# Patient Record
Sex: MALE | Race: WHITE | NOT HISPANIC OR LATINO | Employment: OTHER | URBAN - METROPOLITAN AREA
[De-identification: names, ages, dates, MRNs, and addresses within clinical notes are randomized per-mention and may not be internally consistent; named-entity substitution may affect disease eponyms.]

---

## 2020-09-18 ENCOUNTER — TELEPHONE (OUTPATIENT)
Dept: CARDIOLOGY CLINIC | Facility: CLINIC | Age: 70
End: 2020-09-18

## 2020-09-18 NOTE — TELEPHONE ENCOUNTER
Spoke Ivet Cain at [facility] ([place], [place]) last Doctor note, labs and test will be faxed over  Patient needs to sign medical release form at the time of visit

## 2020-09-21 ENCOUNTER — OFFICE VISIT (OUTPATIENT)
Dept: CARDIOLOGY CLINIC | Facility: CLINIC | Age: 70
End: 2020-09-21
Payer: MEDICARE

## 2020-09-21 VITALS
OXYGEN SATURATION: 98 % | BODY MASS INDEX: 30.98 KG/M2 | WEIGHT: 216.4 LBS | SYSTOLIC BLOOD PRESSURE: 104 MMHG | HEART RATE: 106 BPM | DIASTOLIC BLOOD PRESSURE: 74 MMHG | HEIGHT: 70 IN

## 2020-09-21 DIAGNOSIS — R94.31 EKG, ABNORMAL: ICD-10-CM

## 2020-09-21 DIAGNOSIS — B33.24 VIRAL CARDIOMYOPATHY (HCC): ICD-10-CM

## 2020-09-21 DIAGNOSIS — I25.10 CORONARY ARTERY DISEASE INVOLVING NATIVE CORONARY ARTERY OF NATIVE HEART WITHOUT ANGINA PECTORIS: ICD-10-CM

## 2020-09-21 DIAGNOSIS — I50.22 CHRONIC SYSTOLIC CONGESTIVE HEART FAILURE (HCC): Primary | ICD-10-CM

## 2020-09-21 PROBLEM — E78.2 MIXED HYPERLIPIDEMIA: Status: ACTIVE | Noted: 2020-09-21

## 2020-09-21 PROBLEM — I42.9 CARDIOMYOPATHY (HCC): Status: ACTIVE | Noted: 2020-09-21

## 2020-09-21 PROCEDURE — 93000 ELECTROCARDIOGRAM COMPLETE: CPT | Performed by: INTERNAL MEDICINE

## 2020-09-21 PROCEDURE — 99205 OFFICE O/P NEW HI 60 MIN: CPT | Performed by: INTERNAL MEDICINE

## 2020-09-21 RX ORDER — ASPIRIN 81 MG/1
81 TABLET, CHEWABLE ORAL DAILY
COMMUNITY

## 2020-09-21 RX ORDER — FINASTERIDE 5 MG/1
5 TABLET, FILM COATED ORAL DAILY
COMMUNITY
End: 2022-01-16 | Stop reason: HOSPADM

## 2020-09-21 RX ORDER — LANOLIN ALCOHOL/MO/W.PET/CERES
5000 CREAM (GRAM) TOPICAL DAILY
COMMUNITY

## 2020-09-21 NOTE — PROGRESS NOTES
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[address] )  Echo complete with contrast if indicated   3  EKG, abnormal     4  Coronary artery disease involving native coronary artery of native heart without angina pectoris         Discussion/Summary:  1  NICM LVEF 35% severely dilated  2  CAD small D1 90%  3  Chronic systolic CHF II/C  4  HLD on Red rice yeast  5  DMtype 2    Recommendations:  Continue carvedilol and ramipril  The patient was unable to get Entresto due to cost   Blood pressures have been quite low and he is only tolerating a low-dose of ramipril  His coronary disease is minimal with the small diagonal and does not explain the cardiomyopathy  I would suspect this is viral in nature cardiac MRI was not performed  This dates back to 2015  His presenting ventricle was already moderately dilated in 2015 suggesting this might have even been going on for some time prior to that  He is euvolemic on exam   Blood pressure is well controlled  I did talk to him about a defibrillator which he has declined in the past will need to get another echo to evaluate where his ejection fraction is  We talked about statin therapy he wants to continue with red yeast rice alone  Recommend he continue on aspirin 81 mg daily  Continue treatment for diabetes  I will see him back in 3 months to review his echo  Reviewed heart failure teaching with sodium and fluid restrictions    Interval History:  79year-old patient presents for a new patient evaluation  He has a history of nonischemic cardiomyopathy dating back to 2015  Initial left heart catheterization showed small diagonal that was 90% but other coronary arteries were open    He does not have a history of heavy alcohol or drug use  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  He had 1 episode of decompensated heart failure 2015 but has been stable since then  He remains active around his house  He does not like to take many medications  He has been on red yeast rice in replacement of statin  He turned down a defibrillator years  He is active and enjoys his life  Denies any chest pain, palpitations, lightheadedness, dizziness, or syncope  He does get short of breath when he spends a full day working  Denies any lower extremity edema, PND, orthopnea      Problem List     Precordial pain    Chronic low back pain    Systolic congestive heart failure (HCC)    Wt Readings from Last 3 Encounters:   09/21/20 98 2 kg (216 lb 6 4 oz)   08/18/16 108 kg (239 lb)                 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[facility] )    EKG, abnormal    Cardiomyopathy ([facility] )        Past Medical History:   Diagnosis Date    Atypical chest pain     Back pain     CHF (congestive heart failure) ([facility] )     Legionnaire's disease ([facility] )     Lung disorder     Migraine      Social History     Socioeconomic History    Marital status: /Civil Union     Spouse name: Not on file    Number of children: Not on file    Years of education: Not on file    Highest education level: Not on file   Occupational History    Not on file   Social Needs    Financial resource strain: Not on file    Food insecurity     Worry: Not on file     Inability: Not on file   [employer] needs     Medical: Not on file     Non-medical: Not on file   Tobacco Use    Smoking status: Former Smoker    Smokeless tobacco: Former User   Substance and Sexual Activity    Alcohol use: Yes     Frequency: Monthly or less    Drug use: Yes     Comment: CBD Oil    Sexual activity: Not on file   Lifestyle    Physical activity     Days per week: Not on file     Minutes per session: Not on file    Stress: Not on file   Relationships    Social connections     Talks on phone: Not on file     Gets together: Not on file     Attends Anabaptism service: Not on file     Active member of club or organization: Not on file     Attends meetings of clubs or organizations: Not on file     Relationship status: Not on file    Intimate partner violence     Fear of current or ex partner: Not on file     Emotionally abused: Not on file     Physically abused: Not on file     Forced sexual activity: Not on file   Other Topics Concern    Not on file   Social History Narrative    Not on file      History reviewed  No pertinent family history  Past Surgical History:   Procedure Laterality Date    ROTATOR CUFF REPAIR Bilateral     TONSILLECTOMY      TRIGGER FINGER RELEASE         Current Outpatient Medications:     albuterol (2 5 mg/3 mL) 0 083 % nebulizer solution, Take 2 5 mg by nebulization every 6 (six) hours as needed for wheezing , Disp: , Rfl:     aspirin 81 mg chewable tablet, Chew 81 mg daily, Disp: , Rfl:     carvedilol (COREG) 3 125 mg tablet, Take 3 125 mg by mouth 2 (two) times a day with meals  , Disp: , Rfl:     finasteride (PROSCAR) 5 mg tablet, Take 5 mg by mouth daily, Disp: , Rfl:     furosemide (LASIX) 20 mg tablet, Take 20 mg by mouth 2 (two) times a day , Disp: , Rfl:     metFORMIN (GLUCOPHAGE) 500 mg tablet, Take 500 mg by mouth daily, Disp: , Rfl:     ramipril (ALTACE) 1 25 mg capsule, Take 1 25 mg by mouth daily  , Disp: , Rfl:     Red Yeast Rice Extract (RED YEAST RICE PO), Take 2 tablets by mouth 2 (two) times a day, Disp: , Rfl:     rizatriptan (MAXALT) 10 MG tablet, Take 10 mg by mouth once as needed for migraine   May repeat in 2 hours if needed, Disp: , Rfl:     vitamin B-12 (VITAMIN B-12) 1,000 mcg tablet, Take 5,000 mcg by mouth daily, Disp: , Rfl:   Allergies   Allergen Reactions    Poultry Meal        Labs:     Chemistry        Component Value Date/Time    K 3 9 08/19/2016 0418     08/19/2016 0418    CO2 26 08/19/2016 0418    BUN 16 08/19/2016 0418    CREATININE 1 00 08/19/2016 0418 Component Value Date/Time    CALCIUM 8 2 (L) 08/19/2016 0418    ALKPHOS 51 08/19/2016 0418    AST 17 08/19/2016 0418    ALT 25 08/19/2016 0418            No results found for: CHOL  Lab Results   Component Value Date    HDL 37 (L) 08/19/2016     Lab Results   Component Value Date    LDLCALC 83 08/19/2016     Lab Results   Component Value Date    TRIG 190 (H) 08/19/2016     No results found for: CHOLHDL    Imaging: No results found  ECG:  Sinus tach      Review of Systems   Constitution: Negative  HENT: Negative  Eyes: Negative  Cardiovascular: Negative  Respiratory: Negative  Endocrine: Negative  Hematologic/Lymphatic: Negative  Skin: Negative  Musculoskeletal: Negative  Gastrointestinal: Negative  Genitourinary: Negative  Neurological: Negative  Psychiatric/Behavioral: Negative  All other systems reviewed and are negative  Vitals:    09/21/20 1035   BP: 104/74   Pulse: (!) 106   SpO2: 98%     Vitals:    09/21/20 1035   Weight: 98 2 kg (216 lb 6 4 oz)     Height: 5' 10" (177 8 cm)   Body mass index is 31 05 kg/m²  Physical Exam:  Vital signs reviewed  General appearance:  Appears stated age, alert, well appearing and in no distress  HEENT:  PERRLA, EOMI, no scleral icterus, no conjunctival pallor  NECK:  Supple, No elevated JVP, no thyromegaly, no carotid bruits, no JVD  HEART:  Regular rate and rhythm, normal S1/S2, no S3/S4, no murmur or rub, PMI nondisplaced  LUNGS:  Clear to auscultation bilaterally, no wheezes rales or rhonchi  ABDOMEN:  Soft, non-tender, positive bowel sounds, no rebound or guarding, no organomegaly   EXTREMITIES:  Normal range of motion  No clubbing or cyanosis   No edema  VASCULAR:  Normal pedal pulses   SKIN: No lesions or rashes on exposed skin  NEURO:  CN II-XII intact, no focal deficits

## 2020-11-25 ENCOUNTER — APPOINTMENT (EMERGENCY)
Dept: CT IMAGING | Facility: HOSPITAL | Age: 70
End: 2020-11-25
Payer: MEDICARE

## 2020-11-25 ENCOUNTER — HOSPITAL ENCOUNTER (EMERGENCY)
Facility: HOSPITAL | Age: 70
Discharge: HOME | End: 2020-11-25
Attending: EMERGENCY MEDICINE | Admitting: EMERGENCY MEDICINE
Payer: MEDICARE

## 2020-11-25 VITALS
BODY MASS INDEX: 31 KG/M2 | RESPIRATION RATE: 18 BRPM | SYSTOLIC BLOOD PRESSURE: 116 MMHG | HEART RATE: 104 BPM | DIASTOLIC BLOOD PRESSURE: 78 MMHG | OXYGEN SATURATION: 98 % | WEIGHT: 216.05 LBS | TEMPERATURE: 98 F

## 2020-11-25 DIAGNOSIS — I63.512 ACUTE ISCHEMIC LEFT MCA STROKE (HCC): Primary | ICD-10-CM

## 2020-11-25 DIAGNOSIS — I63.412 STROKE DUE TO EMBOLISM OF LEFT MIDDLE CEREBRAL ARTERY (HCC): ICD-10-CM

## 2020-11-25 PROBLEM — J90 PLEURAL EFFUSION: Status: ACTIVE | Noted: 2020-11-25

## 2020-11-25 PROBLEM — E11.9 TYPE 2 DIABETES MELLITUS (HCC): Status: ACTIVE | Noted: 2020-11-25

## 2020-11-25 LAB
ALBUMIN SERPL BCP-MCNC: 3.6 G/DL (ref 3.5–5)
ALP SERPL-CCNC: 45 U/L (ref 46–116)
ALT SERPL W P-5'-P-CCNC: 20 U/L (ref 12–78)
ANION GAP SERPL CALCULATED.3IONS-SCNC: 12 MMOL/L (ref 4–13)
APTT PPP: 30 SECONDS (ref 23–37)
AST SERPL W P-5'-P-CCNC: 13 U/L (ref 5–45)
ATRIAL RATE: 105 BPM
BASOPHILS # BLD AUTO: 0.03 THOUSANDS/ΜL (ref 0–0.1)
BASOPHILS NFR BLD AUTO: 1 % (ref 0–1)
BILIRUB SERPL-MCNC: 0.49 MG/DL (ref 0.2–1)
BUN SERPL-MCNC: 20 MG/DL (ref 5–25)
CALCIUM SERPL-MCNC: 9.2 MG/DL (ref 8.3–10.1)
CHLORIDE SERPL-SCNC: 104 MMOL/L (ref 100–108)
CHOLEST SERPL-MCNC: 152 MG/DL (ref 50–200)
CHOLEST SERPL-MCNC: 153 MG/DL (ref 50–200)
CO2 SERPL-SCNC: 25 MMOL/L (ref 21–32)
CREAT SERPL-MCNC: 1.09 MG/DL (ref 0.6–1.3)
EOSINOPHIL # BLD AUTO: 0.17 THOUSAND/ΜL (ref 0–0.61)
EOSINOPHIL NFR BLD AUTO: 3 % (ref 0–6)
ERYTHROCYTE [DISTWIDTH] IN BLOOD BY AUTOMATED COUNT: 13.4 % (ref 11.6–15.1)
EST. AVERAGE GLUCOSE BLD GHB EST-MCNC: 114 MG/DL
GFR SERPL CREATININE-BSD FRML MDRD: 68 ML/MIN/1.73SQ M
GLUCOSE SERPL-MCNC: 129 MG/DL (ref 65–140)
HBA1C MFR BLD: 5.6 %
HCT VFR BLD AUTO: 43.3 % (ref 36.5–49.3)
HDLC SERPL-MCNC: 42 MG/DL
HDLC SERPL-MCNC: 42 MG/DL
HGB BLD-MCNC: 14.2 G/DL (ref 12–17)
IMM GRANULOCYTES # BLD AUTO: 0.03 THOUSAND/UL (ref 0–0.2)
IMM GRANULOCYTES NFR BLD AUTO: 1 % (ref 0–2)
INR PPP: 1.06 (ref 0.84–1.19)
LDLC SERPL CALC-MCNC: 89 MG/DL (ref 0–100)
LDLC SERPL CALC-MCNC: 90 MG/DL (ref 0–100)
LYMPHOCYTES # BLD AUTO: 1.02 THOUSANDS/ΜL (ref 0.6–4.47)
LYMPHOCYTES NFR BLD AUTO: 18 % (ref 14–44)
MCH RBC QN AUTO: 30.9 PG (ref 26.8–34.3)
MCHC RBC AUTO-ENTMCNC: 32.8 G/DL (ref 31.4–37.4)
MCV RBC AUTO: 94 FL (ref 82–98)
MONOCYTES # BLD AUTO: 0.48 THOUSAND/ΜL (ref 0.17–1.22)
MONOCYTES NFR BLD AUTO: 8 % (ref 4–12)
NEUTROPHILS # BLD AUTO: 4.09 THOUSANDS/ΜL (ref 1.85–7.62)
NEUTS SEG NFR BLD AUTO: 69 % (ref 43–75)
NONHDLC SERPL-MCNC: 110 MG/DL
NRBC BLD AUTO-RTO: 0 /100 WBCS
P AXIS: 66 DEGREES
PLATELET # BLD AUTO: 236 THOUSANDS/UL (ref 149–390)
PMV BLD AUTO: 10.4 FL (ref 8.9–12.7)
POTASSIUM SERPL-SCNC: 3.6 MMOL/L (ref 3.5–5.3)
PR INTERVAL: 158 MS
PROT SERPL-MCNC: 7.4 G/DL (ref 6.4–8.2)
PROTHROMBIN TIME: 13.9 SECONDS (ref 11.6–14.5)
QRS AXIS: -9 DEGREES
QRSD INTERVAL: 110 MS
QT INTERVAL: 330 MS
QTC INTERVAL: 436 MS
RBC # BLD AUTO: 4.6 MILLION/UL (ref 3.88–5.62)
SODIUM SERPL-SCNC: 141 MMOL/L (ref 136–145)
T WAVE AXIS: 105 DEGREES
TRIGL SERPL-MCNC: 105 MG/DL
TRIGL SERPL-MCNC: 106 MG/DL
VENTRICULAR RATE: 105 BPM
WBC # BLD AUTO: 5.82 THOUSAND/UL (ref 4.31–10.16)

## 2020-11-25 PROCEDURE — G1004 CDSM NDSC: HCPCS

## 2020-11-25 PROCEDURE — 36415 COLL VENOUS BLD VENIPUNCTURE: CPT | Performed by: EMERGENCY MEDICINE

## 2020-11-25 PROCEDURE — 83036 HEMOGLOBIN GLYCOSYLATED A1C: CPT | Performed by: EMERGENCY MEDICINE

## 2020-11-25 PROCEDURE — 93010 ELECTROCARDIOGRAM REPORT: CPT | Performed by: INTERNAL MEDICINE

## 2020-11-25 PROCEDURE — 70498 CT ANGIOGRAPHY NECK: CPT

## 2020-11-25 PROCEDURE — 85730 THROMBOPLASTIN TIME PARTIAL: CPT | Performed by: EMERGENCY MEDICINE

## 2020-11-25 PROCEDURE — 80061 LIPID PANEL: CPT | Performed by: PHYSICIAN ASSISTANT

## 2020-11-25 PROCEDURE — 99205 OFFICE O/P NEW HI 60 MIN: CPT | Performed by: PSYCHIATRY & NEUROLOGY

## 2020-11-25 PROCEDURE — 85025 COMPLETE CBC W/AUTO DIFF WBC: CPT | Performed by: EMERGENCY MEDICINE

## 2020-11-25 PROCEDURE — 80061 LIPID PANEL: CPT | Performed by: EMERGENCY MEDICINE

## 2020-11-25 PROCEDURE — 70450 CT HEAD/BRAIN W/O DYE: CPT

## 2020-11-25 PROCEDURE — 93005 ELECTROCARDIOGRAM TRACING: CPT

## 2020-11-25 PROCEDURE — 80053 COMPREHEN METABOLIC PANEL: CPT | Performed by: EMERGENCY MEDICINE

## 2020-11-25 PROCEDURE — 85610 PROTHROMBIN TIME: CPT | Performed by: EMERGENCY MEDICINE

## 2020-11-25 PROCEDURE — 70496 CT ANGIOGRAPHY HEAD: CPT

## 2020-11-25 PROCEDURE — 99285 EMERGENCY DEPT VISIT HI MDM: CPT

## 2020-11-25 PROCEDURE — 99285 EMERGENCY DEPT VISIT HI MDM: CPT | Performed by: EMERGENCY MEDICINE

## 2020-11-25 RX ORDER — CLOPIDOGREL BISULFATE 75 MG/1
300 TABLET ORAL ONCE
Status: COMPLETED | OUTPATIENT
Start: 2020-11-25 | End: 2020-11-25

## 2020-11-25 RX ADMIN — IOHEXOL 100 ML: 350 INJECTION, SOLUTION INTRAVENOUS at 14:42

## 2020-11-25 RX ADMIN — CLOPIDOGREL BISULFATE 300 MG: 75 TABLET ORAL at 13:52

## 2020-11-27 ENCOUNTER — TELEPHONE (OUTPATIENT)
Dept: NEUROLOGY | Facility: CLINIC | Age: 70
End: 2020-11-27

## 2020-12-03 ENCOUNTER — OFFICE VISIT (OUTPATIENT)
Dept: CARDIOLOGY CLINIC | Facility: CLINIC | Age: 70
End: 2020-12-03
Payer: MEDICARE

## 2020-12-03 ENCOUNTER — TELEPHONE (OUTPATIENT)
Dept: CARDIOLOGY CLINIC | Facility: CLINIC | Age: 70
End: 2020-12-03

## 2020-12-03 ENCOUNTER — LAB (OUTPATIENT)
Dept: LAB | Facility: CLINIC | Age: 70
End: 2020-12-03
Payer: MEDICARE

## 2020-12-03 VITALS
BODY MASS INDEX: 31.35 KG/M2 | OXYGEN SATURATION: 97 % | DIASTOLIC BLOOD PRESSURE: 76 MMHG | SYSTOLIC BLOOD PRESSURE: 118 MMHG | HEART RATE: 104 BPM | HEIGHT: 70 IN | WEIGHT: 219 LBS

## 2020-12-03 DIAGNOSIS — I50.21 ACUTE SYSTOLIC HF (HEART FAILURE) (HCC): Primary | ICD-10-CM

## 2020-12-03 DIAGNOSIS — I50.21 ACUTE SYSTOLIC CONGESTIVE HEART FAILURE (HCC): Primary | ICD-10-CM

## 2020-12-03 DIAGNOSIS — I42.9 CARDIOMYOPATHY, UNSPECIFIED TYPE (HCC): ICD-10-CM

## 2020-12-03 DIAGNOSIS — I25.5 ISCHEMIC CARDIOMYOPATHY: ICD-10-CM

## 2020-12-03 DIAGNOSIS — R06.00 DYSPNEA, UNSPECIFIED TYPE: ICD-10-CM

## 2020-12-03 DIAGNOSIS — I63.412 STROKE DUE TO EMBOLISM OF LEFT MIDDLE CEREBRAL ARTERY (HCC): ICD-10-CM

## 2020-12-03 LAB — NT-PROBNP SERPL-MCNC: 6085 PG/ML

## 2020-12-03 PROCEDURE — 36415 COLL VENOUS BLD VENIPUNCTURE: CPT

## 2020-12-03 PROCEDURE — 83880 ASSAY OF NATRIURETIC PEPTIDE: CPT

## 2020-12-03 PROCEDURE — 99214 OFFICE O/P EST MOD 30 MIN: CPT | Performed by: NURSE PRACTITIONER

## 2020-12-03 RX ORDER — METOPROLOL SUCCINATE 50 MG/1
50 TABLET, EXTENDED RELEASE ORAL DAILY
Qty: 30 TABLET | Refills: 1 | Status: SHIPPED | OUTPATIENT
Start: 2020-12-03 | End: 2021-01-27 | Stop reason: SDUPTHER

## 2020-12-03 RX ORDER — POTASSIUM CHLORIDE 20 MEQ/1
20 TABLET, EXTENDED RELEASE ORAL 2 TIMES DAILY
Qty: 60 TABLET | Refills: 1 | Status: SHIPPED | OUTPATIENT
Start: 2020-12-03 | End: 2020-12-21 | Stop reason: SDUPTHER

## 2020-12-03 RX ORDER — AMPICILLIN TRIHYDRATE 250 MG
500 CAPSULE ORAL DAILY
COMMUNITY

## 2020-12-03 RX ORDER — TORSEMIDE 20 MG/1
20 TABLET ORAL 2 TIMES DAILY
Qty: 60 TABLET | Refills: 1 | Status: SHIPPED | OUTPATIENT
Start: 2020-12-03 | End: 2020-12-21 | Stop reason: SDUPTHER

## 2020-12-11 ENCOUNTER — TELEPHONE (OUTPATIENT)
Dept: CARDIOLOGY CLINIC | Facility: CLINIC | Age: 70
End: 2020-12-11

## 2020-12-15 ENCOUNTER — EVALUATION (OUTPATIENT)
Dept: PHYSICAL THERAPY | Facility: CLINIC | Age: 70
End: 2020-12-15
Payer: MEDICARE

## 2020-12-15 DIAGNOSIS — I63.412 STROKE DUE TO EMBOLISM OF LEFT MIDDLE CEREBRAL ARTERY (HCC): ICD-10-CM

## 2020-12-15 PROCEDURE — 97163 PT EVAL HIGH COMPLEX 45 MIN: CPT | Performed by: PHYSICAL THERAPIST

## 2020-12-21 ENCOUNTER — OFFICE VISIT (OUTPATIENT)
Dept: CARDIOLOGY CLINIC | Facility: CLINIC | Age: 70
End: 2020-12-21
Payer: MEDICARE

## 2020-12-21 ENCOUNTER — HOSPITAL ENCOUNTER (OUTPATIENT)
Dept: RADIOLOGY | Facility: HOSPITAL | Age: 70
Discharge: HOME | End: 2020-12-21
Payer: MEDICARE

## 2020-12-21 VITALS
HEART RATE: 75 BPM | DIASTOLIC BLOOD PRESSURE: 64 MMHG | WEIGHT: 213 LBS | SYSTOLIC BLOOD PRESSURE: 92 MMHG | HEIGHT: 70 IN | BODY MASS INDEX: 30.49 KG/M2 | OXYGEN SATURATION: 96 %

## 2020-12-21 DIAGNOSIS — I63.512 ACUTE ISCHEMIC LEFT MCA STROKE (HCC): ICD-10-CM

## 2020-12-21 DIAGNOSIS — R06.00 DYSPNEA, UNSPECIFIED TYPE: ICD-10-CM

## 2020-12-21 DIAGNOSIS — I50.22 CHRONIC SYSTOLIC CONGESTIVE HEART FAILURE (HCC): Primary | ICD-10-CM

## 2020-12-21 DIAGNOSIS — E78.2 MIXED HYPERLIPIDEMIA: ICD-10-CM

## 2020-12-21 DIAGNOSIS — I42.0 DILATED CARDIOMYOPATHY (HCC): ICD-10-CM

## 2020-12-21 DIAGNOSIS — I25.10 CORONARY ARTERY DISEASE INVOLVING NATIVE CORONARY ARTERY OF NATIVE HEART WITHOUT ANGINA PECTORIS: ICD-10-CM

## 2020-12-21 DIAGNOSIS — I63.412 STROKE DUE TO EMBOLISM OF LEFT MIDDLE CEREBRAL ARTERY (HCC): ICD-10-CM

## 2020-12-21 DIAGNOSIS — I50.21 ACUTE SYSTOLIC HF (HEART FAILURE) (HCC): ICD-10-CM

## 2020-12-21 PROCEDURE — 99214 OFFICE O/P EST MOD 30 MIN: CPT | Performed by: NURSE PRACTITIONER

## 2020-12-21 PROCEDURE — 71046 X-RAY EXAM CHEST 2 VIEWS: CPT

## 2020-12-21 PROCEDURE — 93000 ELECTROCARDIOGRAM COMPLETE: CPT | Performed by: NURSE PRACTITIONER

## 2020-12-21 RX ORDER — TORSEMIDE 20 MG/1
20 TABLET ORAL DAILY
Qty: 60 TABLET | Refills: 1
Start: 2020-12-21 | End: 2021-03-15 | Stop reason: SDUPTHER

## 2020-12-21 RX ORDER — POTASSIUM CHLORIDE 20 MEQ/1
20 TABLET, EXTENDED RELEASE ORAL DAILY
Qty: 60 TABLET | Refills: 1
Start: 2020-12-21 | End: 2021-03-10 | Stop reason: SDUPTHER

## 2020-12-22 ENCOUNTER — EVALUATION (OUTPATIENT)
Dept: SPEECH THERAPY | Facility: CLINIC | Age: 70
End: 2020-12-22
Payer: MEDICARE

## 2020-12-22 ENCOUNTER — EVALUATION (OUTPATIENT)
Dept: OCCUPATIONAL THERAPY | Facility: CLINIC | Age: 70
End: 2020-12-22
Payer: MEDICARE

## 2020-12-22 DIAGNOSIS — R48.8 OTHER SYMBOLIC DYSFUNCTIONS: ICD-10-CM

## 2020-12-22 DIAGNOSIS — I63.412 STROKE DUE TO EMBOLISM OF LEFT MIDDLE CEREBRAL ARTERY (HCC): Primary | ICD-10-CM

## 2020-12-22 DIAGNOSIS — I63.412 STROKE DUE TO EMBOLISM OF LEFT MIDDLE CEREBRAL ARTERY (HCC): ICD-10-CM

## 2020-12-22 DIAGNOSIS — R47.01 APHASIA: Primary | ICD-10-CM

## 2020-12-22 PROCEDURE — 97166 OT EVAL MOD COMPLEX 45 MIN: CPT

## 2020-12-22 PROCEDURE — 96105 ASSESSMENT OF APHASIA: CPT

## 2020-12-22 PROCEDURE — 97530 THERAPEUTIC ACTIVITIES: CPT

## 2020-12-29 ENCOUNTER — APPOINTMENT (OUTPATIENT)
Dept: OCCUPATIONAL THERAPY | Facility: CLINIC | Age: 70
End: 2020-12-29
Payer: MEDICARE

## 2020-12-29 ENCOUNTER — APPOINTMENT (OUTPATIENT)
Dept: SPEECH THERAPY | Facility: CLINIC | Age: 70
End: 2020-12-29
Payer: MEDICARE

## 2020-12-31 ENCOUNTER — OFFICE VISIT (OUTPATIENT)
Dept: SPEECH THERAPY | Facility: CLINIC | Age: 70
End: 2020-12-31
Payer: MEDICARE

## 2020-12-31 ENCOUNTER — OFFICE VISIT (OUTPATIENT)
Dept: OCCUPATIONAL THERAPY | Facility: CLINIC | Age: 70
End: 2020-12-31
Payer: MEDICARE

## 2020-12-31 DIAGNOSIS — I63.412 STROKE DUE TO EMBOLISM OF LEFT MIDDLE CEREBRAL ARTERY (HCC): Primary | ICD-10-CM

## 2020-12-31 DIAGNOSIS — R48.8 OTHER SYMBOLIC DYSFUNCTIONS: ICD-10-CM

## 2020-12-31 DIAGNOSIS — I63.412 STROKE DUE TO EMBOLISM OF LEFT MIDDLE CEREBRAL ARTERY (HCC): ICD-10-CM

## 2020-12-31 DIAGNOSIS — R47.01 APHASIA: Primary | ICD-10-CM

## 2020-12-31 PROCEDURE — 97530 THERAPEUTIC ACTIVITIES: CPT

## 2020-12-31 PROCEDURE — 97112 NEUROMUSCULAR REEDUCATION: CPT

## 2020-12-31 PROCEDURE — 92507 TX SP LANG VOICE COMM INDIV: CPT

## 2021-01-04 ENCOUNTER — OFFICE VISIT (OUTPATIENT)
Dept: PULMONOLOGY | Facility: CLINIC | Age: 71
End: 2021-01-04
Payer: MEDICARE

## 2021-01-04 VITALS
WEIGHT: 217 LBS | HEIGHT: 70 IN | TEMPERATURE: 97.5 F | OXYGEN SATURATION: 97 % | DIASTOLIC BLOOD PRESSURE: 86 MMHG | HEART RATE: 76 BPM | BODY MASS INDEX: 31.07 KG/M2 | SYSTOLIC BLOOD PRESSURE: 124 MMHG

## 2021-01-04 DIAGNOSIS — R06.00 DYSPNEA, UNSPECIFIED TYPE: ICD-10-CM

## 2021-01-04 DIAGNOSIS — I42.9 CARDIOMYOPATHY, UNSPECIFIED TYPE (HCC): ICD-10-CM

## 2021-01-04 PROCEDURE — 99204 OFFICE O/P NEW MOD 45 MIN: CPT | Performed by: INTERNAL MEDICINE

## 2021-01-04 NOTE — PROGRESS NOTES
Pulmonary   Rocio Schwab 79 y o  male MRN: [id number]  Unit/Bed#:  Encounter: [id number]      Reason for consultation: dyspnea, recurrent/yearly pneumonia episodes     Requesting physician: EVERT Morales    Assessment/Plan:    77y/o M w/ recent CVA (11/25) and recent acute CHF exacerbation presenting for referral by cardiologist regarding patients hx of SOB, hx of recurrent bronchitis/pneumonia    -Will contact pts PCP regarding pt reported recent CT/MRI  -Will schedule PFTs  -Follow up in office 6-8wks    History of Present Illness   HPI:  Rocio Schwab is a 79 y o  male who presents to office after being referred by his cardiologist  He states that he is here because he often gets "yearly, sometimes 2-3x yearly episodes of bronchitis that goes into walking pneumonia " He states that he has been on multiple different types of abx for this in the past and each time he "ends up requiring a stronger abx" Mr Velma Lam says that he has not yet had an episode this year  He reports that he recently had imaging of his chest in September d/t his complaints of am dark red hemoptysis that has been occurring since august 2020  Patient has a past pulmonary history of Legionnaires disease (date unknown but first diagnosed around 5?)   He reports that he has had lung "issues" since the [employer] beginning in the 1970s  He thinks that he has [facility] but is unsure  He is also a former smoker, began smoking at age 12, quit in late 76s, began smoking again on and off 80s-90s, but quit in the late 80s  Has not smoked cigarettes since then but does report he occasionally smoked cigars in 2000s but hasnt in many years  In addition to [employer], he has a past occupational hisotry of 15yrs working at a [employer]  Denies travel, denies sick contacts  Review of Systems   Constitutional: Negative for fatigue and fever  Eyes: Negative for visual disturbance     Respiratory: Positive for shortness of breath  Hemoptysis for 6-7 months in am w/ dark red clump of blood, small amount, resolves after the morning episode   Cardiovascular: Negative for chest pain, palpitations and leg swelling  Neurological: Positive for facial asymmetry (mild, residual from CVA)  Psychiatric/Behavioral: The patient is not nervous/anxious  All other systems reviewed and are negative  Historical Information   Past Medical History:   Diagnosis Date    Atypical chest pain     Back pain     CHF (congestive heart failure) (HCC)     Legionnaire's disease ([facility] )     Lung disorder     Migraine      Past Surgical History:   Procedure Laterality Date    ROTATOR CUFF REPAIR Bilateral     TONSILLECTOMY      TRIGGER FINGER RELEASE       No family history on file  Occupational History: [employer], [employer] for 15 yrs    Social History: Hx of smoking, see hpi ; no travel hx no sick contacts    Meds/Allergies   No current facility-administered medications for this visit  (Not in a hospital admission)    Allergies   Allergen Reactions    Poultry Meal        Vitals: Blood pressure 124/86, pulse 76, temperature 97 5 °F (36 4 °C), temperature source Temporal, height 5' 10" (1 778 m), weight 98 4 kg (217 lb), SpO2 97 % , RA, Body mass index is 31 14 kg/m²  Physical Exam  Vitals signs reviewed  Constitutional:       General: He is not in acute distress  HENT:      Head: Normocephalic and atraumatic  Eyes:      General: No scleral icterus  Cardiovascular:      Rate and Rhythm: Normal rate and regular rhythm  Pulses: Normal pulses  Heart sounds: Normal heart sounds  No murmur  Pulmonary:      Effort: Pulmonary effort is normal  No respiratory distress  Breath sounds: Normal breath sounds  No rhonchi or rales  Abdominal:      General: Bowel sounds are normal    Musculoskeletal:      Right lower leg: No edema  Left lower leg: No edema  Skin:     General: Skin is warm and dry  Capillary Refill: Capillary refill takes less than 2 seconds  Neurological:      Mental Status: He is alert  Comments: Delayed speech, s/p CVA   Psychiatric:         Mood and Affect: Mood normal          Behavior: Behavior normal        Imaging and other studies: I have personally reviewed pertinent reports     and I have personally reviewed pertinent films in PACS    Pulmonary function testing: scheduled     Nia Liang MD  Family Medicine PGY-1  1/4/2021  5:54 PM

## 2021-01-05 ENCOUNTER — OFFICE VISIT (OUTPATIENT)
Dept: OCCUPATIONAL THERAPY | Facility: CLINIC | Age: 71
End: 2021-01-05
Payer: MEDICARE

## 2021-01-05 ENCOUNTER — OFFICE VISIT (OUTPATIENT)
Dept: SPEECH THERAPY | Facility: CLINIC | Age: 71
End: 2021-01-05
Payer: MEDICARE

## 2021-01-05 DIAGNOSIS — I63.412 STROKE DUE TO EMBOLISM OF LEFT MIDDLE CEREBRAL ARTERY (HCC): ICD-10-CM

## 2021-01-05 DIAGNOSIS — I63.412 STROKE DUE TO EMBOLISM OF LEFT MIDDLE CEREBRAL ARTERY (HCC): Primary | ICD-10-CM

## 2021-01-05 DIAGNOSIS — R48.8 OTHER SYMBOLIC DYSFUNCTIONS: ICD-10-CM

## 2021-01-05 DIAGNOSIS — R47.01 APHASIA: Primary | ICD-10-CM

## 2021-01-05 PROCEDURE — 92507 TX SP LANG VOICE COMM INDIV: CPT

## 2021-01-05 PROCEDURE — 97535 SELF CARE MNGMENT TRAINING: CPT

## 2021-01-05 PROCEDURE — 97530 THERAPEUTIC ACTIVITIES: CPT

## 2021-01-05 NOTE — PROGRESS NOTES
Daily Speech Treatment Note    Today's date: 2021  Patients name: Hernesto Millan  : 1950  MRN: [id number]  Safety measures: H/o CVA, Mild Reduced Insight   Referring provider: Eleonore Lennox, CRNP    Primary diagnosis/billing code: R 47 01  Secondary diagnosis/billing code: R 48 8, I 63 412    Visit tracking:  -Referring provider: Epic  -Billing guidelines: CMS  -Visit #3/10  -Insurance: Medicare  -RE due 2021    Subjective/Behavioral:  -"Today is a bad day " (Patient explained that he did not have sufficient sleep last night)  Objective/Assessment:  -Patient's family member/caregiver was present during today's session   -Reviewed patient's home exercises/activities completed since last appointment  Patient completed "Completing Words" worksheet with 95% accuracy independently  Short Term Goals  1  Patient will name an appropriate word opposite (e g , hot and /cold/) with 80% accuracy to build expressive vocabulary for conversation, to be achieved in 4-6 weeks  2  Patient will name an appropriate word synonym (e g , street or /road/) with 80% accuracy to build expressive vocabulary for conversation, to be achieved in 4-6 weeks  To target expressive language:  Patient played "Jeopardy" where he was given a target word and was instructed to state a synonym  Patient stated synonyms in 21/25 (84%) opportunities independently, increasing to 25/25 (100%) opportunities given semantic cues  3  Patient will follow 2-step verbal directions with 80% accuracy to facilitate increased auditory comprehension skills, to be achieved in 4-6 weeks  4  Patient will complete complex auditory attention processing tasks (e g , sentence unscramble, ranking numbers/words, etc ) to improve working memory with 80% accuracy, to be achieved in 4-6 weeks      To target complex auditory attention:  Patient was presented with a list of three words and was instructed to state them in the most logical sequence  Patient completed this task in 11/18 (61%) opportunities independently, increasing to 16/18 (89%) opportunities given one repetition, increasing to 18/18 (100%) opportunities given mod verbal cues  5  Patient will facilitate planning by completing thought organization tasks (e g , sequencing, deduction puzzles, etc ) with 80% accuracy to facilitate increased executive functioning, working memory, problem solving, and processing skills, to be achieved in 4-6 weeks  6  Patient will complete concrete and abstract categorization tasks to 80% accuracy to facilitate improved generative naming skills and working memory, to be achieved in 4-6 weeks  Plan:  -Patient was provided with home exercises/activities to target goals in plan of care at the end of today's session   -Continue with current plan of care

## 2021-01-05 NOTE — PROGRESS NOTES
Occupational Therapy Daily Note:    Today's date: 2021  Patient name: Ebony Fink  : 1950  MRN: [id number]  Referring provider: EVERT Pritchard  Dx:   Encounter Diagnosis   Name Primary?  Stroke due to embolism of left middle cerebral artery (HCC) Yes       Subjective: "I am getting better with speech"    Objective: Pt seen for OT treatment session focusing on /VM skills, working memory, visual problem solving, sustained attention, and pt education  Pt engaged in working memory task to recall 4 words and place in chronological, alphabetical, or functional order and complete search in cluttered visual field to address efficiency of visual search strategy  Pt able to complete with 75% accuracy overall and 75% recall  Inc time to locate in cluttered field  Game of Rush hour for visual problem solving and sustained attention to task    Extensive edu provided on return to driving and to follow MD directions and orders related to driving  Assessment: Tolerated treatment well  Patient would benefit from continued skilled OT  Plan: Continued skilled OT per POC      INTERVENTION COMMENTS:  Diagnosis: Stroke due to embolism of left middle cerebral artery (HCC) [I63 412]  Precautions: CHF, APHASIA  FOTO: COMPLETED WITH SPOUSE, APHASIA  Insurance: Payor: MEDICARE / Plan: MEDICARE A AND B / Product Type: Medicare A & B Fee for Service /   3 of 10 visits, PN due 21

## 2021-01-08 ENCOUNTER — OFFICE VISIT (OUTPATIENT)
Dept: SPEECH THERAPY | Facility: CLINIC | Age: 71
End: 2021-01-08
Payer: MEDICARE

## 2021-01-08 ENCOUNTER — OFFICE VISIT (OUTPATIENT)
Dept: OCCUPATIONAL THERAPY | Facility: CLINIC | Age: 71
End: 2021-01-08
Payer: MEDICARE

## 2021-01-08 DIAGNOSIS — R47.01 APHASIA: Primary | ICD-10-CM

## 2021-01-08 DIAGNOSIS — I63.412 STROKE DUE TO EMBOLISM OF LEFT MIDDLE CEREBRAL ARTERY (HCC): ICD-10-CM

## 2021-01-08 DIAGNOSIS — R48.8 OTHER SYMBOLIC DYSFUNCTIONS: ICD-10-CM

## 2021-01-08 DIAGNOSIS — I63.412 STROKE DUE TO EMBOLISM OF LEFT MIDDLE CEREBRAL ARTERY (HCC): Primary | ICD-10-CM

## 2021-01-08 PROCEDURE — 92507 TX SP LANG VOICE COMM INDIV: CPT

## 2021-01-08 PROCEDURE — 97530 THERAPEUTIC ACTIVITIES: CPT

## 2021-01-08 NOTE — PROGRESS NOTES
Daily Speech Treatment Note    Today's date: 2021  Patients name: Palomo Truong  : 1950  MRN: [id number]  Safety measures: H/o CVA, Mild Reduced Insight   Referring provider: EVERT Giang    Primary diagnosis/billing code: R 47 01  Secondary diagnosis/billing code: R 48 8, I 63 412    Visit tracking:  -Referring provider: Epic  -Billing guidelines: CMS  -Visit #4/10  -Insurance: Medicare  -RE due 2021    Subjective/Behavioral:  -"How are you?"     Objective/Assessment:  -Patient's family member/caregiver was present during today's session   -Reviewed patient's home exercises/activities completed since last appointment  Patient completed category matrices with 100% accuracy independently  Patient completed category naming with 100% accuracy independently  Short Term Goals  1  Patient will name an appropriate word opposite (e g , hot and /cold/) with 80% accuracy to build expressive vocabulary for conversation, to be achieved in 4-6 weeks  2  Patient will name an appropriate word synonym (e g , street or /road/) with 80% accuracy to build expressive vocabulary for conversation, to be achieved in 4-6 weeks  3  Patient will follow 2-step verbal directions with 80% accuracy to facilitate increased auditory comprehension skills, to be achieved in 4-6 weeks  To target auditory comprehension:  Patient was verbally presented with 2-4 step directions (e g  Close your eyes, shake your head 'no,' raise your hand)  Patient completed this task in 12/15 (80%) opportunities independently, increasing to 15/15 (100%) opportunities given one repetition  4  Patient will complete complex auditory attention processing tasks (e g , sentence unscramble, ranking numbers/words, etc ) to improve working memory with 80% accuracy, to be achieved in 4-6 weeks      5  Patient will facilitate planning by completing thought organization tasks (e g , sequencing, deduction puzzles, etc ) with 80% accuracy to facilitate increased executive functioning, working memory, problem solving, and processing skills, to be achieved in 4-6 weeks  To target working memory and mental manipulation:  Patient was presented with word scrambles  Patient unscrambled words according to category in 7/10 (70%) opportunities independently, increasing to 10/10 (100%) opportunities given visual supports and semantic cues  6  Patient will complete concrete and abstract categorization tasks to 80% accuracy to facilitate improved generative naming skills and working memory, to be achieved in 4-6 weeks  To target expressive language:  Patient was verbally presented with analogies with the last blank missing (e g  Birds are to sing as dogs are to ____)  Patient completed this task in 16/25 (64%) opportunities independently, increasing to 25/25 (100%) opportunities given mod semantic cues  Patient expressed frustration with this task as he was unsure of how to answer some of the questions  To target expressive language:  Patient was given two items that are semantically related (e g  nails, screws) and was instructed to state at least two ways they are different  Patient completed this task in 10/12 (83%) opportunities independently, increasing to 12/12 (100%) opportunities given min verbal cues  Plan:  -Patient was provided with home exercises/activities to target goals in plan of care at the end of today's session   -Continue with current plan of care

## 2021-01-08 NOTE — PROGRESS NOTES
Daily Note     Today's date: 2021  Patient name: Georgina Shen  : 1950  MRN: [id number]  Referring provider: EVERT Sanchez  Dx:   Encounter Diagnosis   Name Primary?  Stroke due to embolism of left middle cerebral artery ([address] ) Yes       Start Time: 1500  Stop Time: 1600  Total time in clinic (min): 60 minutes    Subjective: "Theres not much in there"  Objective: See treatment diary below    Pt participated in skilled OT focusing on working memory, sustained/alternating attention and immediate memory recall  [facility] visual memory recall with <30sec study time to recall and sequence 3 shapes grading up to 4 shapes w/successful recall, retrieval and sequencing, mod difficulty to recall 5 shapes  Pt engaged in [place] focusing on memory recall and sequence of up to 5 shapes with 62% accuracy after 4 trials  Assessment: Tolerated treatment well   Patient would benefit from continued OT      Plan: Continued skilled OT per POC    INTERVENTION COMMENTS:  Diagnosis: Stroke due to embolism of left middle cerebral artery (HCC) [I63 412]  Precautions: CHF, APHASIA  FOTO: COMPLETED WITH SPOUSE, APHASIA  Insurance: Payor: MEDICARE / Plan: MEDICARE A AND B / Product Type: Medicare A & B Fee for Service /   4 of 10 visits, PN due 21

## 2021-01-11 ENCOUNTER — APPOINTMENT (OUTPATIENT)
Dept: OCCUPATIONAL THERAPY | Facility: CLINIC | Age: 71
End: 2021-01-11
Payer: MEDICARE

## 2021-01-11 ENCOUNTER — APPOINTMENT (OUTPATIENT)
Dept: SPEECH THERAPY | Facility: CLINIC | Age: 71
End: 2021-01-11
Payer: MEDICARE

## 2021-01-12 ENCOUNTER — APPOINTMENT (OUTPATIENT)
Dept: SPEECH THERAPY | Facility: CLINIC | Age: 71
End: 2021-01-12
Payer: MEDICARE

## 2021-01-12 ENCOUNTER — APPOINTMENT (OUTPATIENT)
Dept: OCCUPATIONAL THERAPY | Facility: CLINIC | Age: 71
End: 2021-01-12
Payer: MEDICARE

## 2021-01-15 ENCOUNTER — HOSPITAL ENCOUNTER (OUTPATIENT)
Dept: NON INVASIVE DIAGNOSTICS | Facility: CLINIC | Age: 71
Discharge: HOME | End: 2021-01-15
Payer: MEDICARE

## 2021-01-15 DIAGNOSIS — I50.22 CHRONIC SYSTOLIC CONGESTIVE HEART FAILURE (HCC): ICD-10-CM

## 2021-01-15 DIAGNOSIS — B33.24 VIRAL CARDIOMYOPATHY (HCC): ICD-10-CM

## 2021-01-15 PROCEDURE — 93306 TTE W/DOPPLER COMPLETE: CPT

## 2021-01-18 ENCOUNTER — OFFICE VISIT (OUTPATIENT)
Dept: OCCUPATIONAL THERAPY | Facility: CLINIC | Age: 71
End: 2021-01-18
Payer: MEDICARE

## 2021-01-18 ENCOUNTER — OFFICE VISIT (OUTPATIENT)
Dept: SPEECH THERAPY | Facility: CLINIC | Age: 71
End: 2021-01-18
Payer: MEDICARE

## 2021-01-18 DIAGNOSIS — I63.412 STROKE DUE TO EMBOLISM OF LEFT MIDDLE CEREBRAL ARTERY (HCC): ICD-10-CM

## 2021-01-18 DIAGNOSIS — I63.412 STROKE DUE TO EMBOLISM OF LEFT MIDDLE CEREBRAL ARTERY (HCC): Primary | ICD-10-CM

## 2021-01-18 DIAGNOSIS — R48.8 OTHER SYMBOLIC DYSFUNCTIONS: ICD-10-CM

## 2021-01-18 DIAGNOSIS — R47.01 APHASIA: Primary | ICD-10-CM

## 2021-01-18 PROCEDURE — 92507 TX SP LANG VOICE COMM INDIV: CPT

## 2021-01-18 PROCEDURE — 97530 THERAPEUTIC ACTIVITIES: CPT

## 2021-01-18 NOTE — PROGRESS NOTES
Daily Speech Treatment Note    Today's date: 2021  Patients name: Patricio Snider  : 1950  MRN: [id number]  Safety measures: H/o CVA, Mild Reduced Insight   Referring provider: EVERT Frederick    Primary diagnosis/billing code: R 47 01  Secondary diagnosis/billing code: R 48 8, I 63 412    Visit tracking:  -Referring provider: Epic  -Billing guidelines: CMS  -Visit #5/10  -Insurance: Medicare  -RE due 2021    Subjective/Behavioral:  -"Today will be my last day "     -Patient stated that he will be going through several procedures and feels overwhelmed with doing two disciplines twice a week  Virtual visits were offered as an option, Patient declined  Patient will be put on a 30 day hold at this time  Objective/Assessment:  -Patient's family member/caregiver was present during today's session   -Reviewed patient's home exercises/activities completed since last appointment  Patient did not return homework today  Short Term Goals  1  Patient will name an appropriate word opposite (e g , hot and /cold/) with 80% accuracy to build expressive vocabulary for conversation, to be achieved in 4-6 weeks  To target expressive vocabulary:  Patient was verbally presented with a target word and was instructed to state an antonym  Patient stated antonyms in 30/30 (100%) opportunities independently  2  Patient will name an appropriate word synonym (e g , street or /road/) with 80% accuracy to build expressive vocabulary for conversation, to be achieved in 4-6 weeks  To target expressive vocabulary:  Patient was verbally presented with a target word and was instructed to state a synonym  Patient stated synonyms in 26/30 (87%) opportunities independently, increasing to 30/30 (100%) opportunities given semantic cues  3  Patient will follow 2-step verbal directions with 80% accuracy to facilitate increased auditory comprehension skills, to be achieved in 4-6 weeks      4  Patient will complete complex auditory attention processing tasks (e g , sentence unscramble, ranking numbers/words, etc ) to improve working memory with 80% accuracy, to be achieved in 4-6 weeks  5  Patient will facilitate planning by completing thought organization tasks (e g , sequencing, deduction puzzles, etc ) with 80% accuracy to facilitate increased executive functioning, working memory, problem solving, and processing skills, to be achieved in 4-6 weeks  6  Patient will complete concrete and abstract categorization tasks to 80% accuracy to facilitate improved generative naming skills and working memory, to be achieved in 4-6 weeks  To target expressive language:  Patient described common words or phrases (e g  Grandfather clock, fast food) to the Clinician and Clinician had to guess what word was being described  Clinician guess 34 words out of 37  Plan:  -Patient was provided with home exercises/activities to target goals in plan of care at the end of today's session   -Patient will be placed on a 30-day hold

## 2021-01-18 NOTE — PROGRESS NOTES
OCCUPATIONAL THERAPY PROGRESS NOTE #1:    1/18/2021  Orestes Santoyo  1950  [id number]  EVERT Toribio  1  Stroke due to embolism of left middle cerebral artery ([facility] )        Subjective    "I just have so many procedures coming up I won't have the time or the energy to continue therapy "    Pt requests discharge from therapy at this time  Assessment/Plan    Skilled Analysis:  Pt is a 79 y o  male referred to Occupational Therapy s/p Stroke due to embolism of left middle cerebral artery ([facility] [address] ) [I63 412]  Pt participated in skilled OT evaluation and following formalized testing, presents with the following areas of deficit: aphasia, visual deficits, cognitive impairments, decreased safety awareness and decreased insight into deficits  Pts cognitive and /VM testing impacted by impaired receptive and expressive language skills  Pt frequently required repetition of directions to inc comprehension  Pt with dec insight into deficits  At this time, pt requested desire to be discharged from therapy in anticipation of several different procedures scheduled in the next month, stating that he will not have the time or energy to continue with therapy  Upon re-testing today, Pt demonstrated improvements in visuospatial functions, attention, and language fluency, as well as improved oculomotor function  Pt still demonstrates difficulties with convergence and bilateral teaming of the eyes, as well as immediate/delayed recall  It is recommended that pt continue OT at this time, however pt decline continuing  Recommended 30 day hold in the event that pt desires to return to therapy within that time frame, pt is agreeable to the 30 day hold       Goals:  Short Term Goals 4-6wks    VISION      · Pt will increase oculomotor control for improved saccades, pursuits, con/divergent tasks for improved reading and written direction following with 50% acccuracy     · Pt will demo with improved visual perceptual skills x 50% for improved accuracy of visual discrimination and spatial relationship tasks     · Pt will increase oculomotor control for improved dynamic activities with head turns to simulate return to  role if cleared with 50% accuracy     Short Term Goals: (4 weeks-6 weeks)    COGNITION         -      Memory    o Pt will demo G recall of 25% of written/verbal information utilizing memory strategy of choice for improved STM/delayed memory     o Pt will demo G carryover of use of internal/external memory strategy aides for improved recall of daily events, improved executive functioning with 25% accuracy     o Pt will demo increased insight into deficits  for overall increased safety and self awareness with   ADL/IADL tasks and to encourage use of compensatory strategies      Goals:  Long Term Goals 8-12 wks    VISION      · Pt will increase oculomotor control for improved saccades, pursuits, con/divergent tasks for improved reading and written direction following with 75% acccuracy    · Pt will demo with improved visual perceptual skills x 75% for improved accuracy of visual discrimination and spatial relationship tasks     · Pt will increase oculomotor control for improved dynamic activities with head turns to simulate return to  role if cleared with 75% accuracy     Long Term Goals: (8-12wks)    COGNITION       Memory    o Pt will demo G recall of 50% of written/verbal information utilizing memory strategy of choice for improved STM/delayed memory     o Pt will demo G carryover of use of internal/external memory strategy aides for improved recall of daily events, improved executive functioning with 50% accuracy     Treatment Interventions  /VM SKILLS  MULTIMATRIX  SPOT IT  VISUAL/CONSTRUCTIONAL TASKS  MEMORY STRATEGIES  VISUAL MEMORY          HISTORY OF PRESENT ILLNESS:     Pt is a 79 y o  male who was referred to Occupational Therapy s/p  Stroke due to embolism of left [facility] ) [Z41 305] Pt arrived with spouse and collaborative history obtained  Pt reports that he was at home late November  Pt with a h/o migraines and cardiac conditions (CHF) as well as back pain  Pt reported hehad a particularly bad headache the day of symptom onset and noticed impaired speech  Additionally, he reports having some difficulty completing invoices at work  Pt's spouse reports that he had refused to go to the hospital  Following appt with PCP, pt was eventually dx with L MCA CVA and was left with residual deficits of cognitive-linguistic and /VM skills  Pt and spouse confirm pt is indep with ADLs  Prior to CVA, pt was driving, working (managing small business) and completing IADLs  Currently, pt requires assistance for cognitive fxn including IADL management due to deficits s/p CVA  Pt and spouse report "no one" spoke to them about driving restrictions at this time       PMH:   Past Medical History:   Diagnosis Date    Atypical chest pain     Back pain     [facility])     Legionnaire's disease ([facility] [address] )     Lung disorder     Migraine                Pain Levels:     Restin    With Activity:  0    Objective    Impairment Observations:    Cognitive Function        SCORE DEFICIT RANGE Comments                                         COGNITIVE FXN                    MOCA (8 3)         Education Level = 12 years     Visuospatial/executive functioning 4/5     Naming 3/3     Memory: 1st trial: 2/5     Memory: 2nd trial: 4/5     Attention/concentration 0/2     List of letters:  1/1     Serial Seven Subtraction: 3/3     Language/sentence repetition: 0/2     Language Fluency:  0/1     Abstract/Correlational Thinkin/2     Delayed Recall: 1/5     Orientation: 6/6     Memory Index Score 8/15                                        Total Score 21/30 MILD COGNITIVE IMPAIRMENTS IMPROVED             Vision        R EYE     L EYE Comments                                        VISION SCREEN NONE      Pt has prescription glasses, did not have    Binocularity (near) orthotropia and orthophoria orthotropia and orthophoria    Binocularity (far) orthotropia and orthophoria orthotropia and orthophoria    Convergence  Weak convergence B/L Weak convergence b/l    Trevin String              Alignment  X   X            Accommodation Blurriness reported at 10 inches  Blurriness reported at 10 inches    pursuits smooth smooth    Saccades inaccurate in all planes inaccurate in all planes hypometric   Range of Motion [facility]             INTERVENTION COMMENTS:  Diagnosis: Stroke due to embolism of left middle cerebral artery (HCC) [I63 412]  Precautions: CHF, APHASIA  FOTO: COMPLETED WITH SPOUSE, APHASIA  Insurance: Payor: MEDICARE / Plan: MEDICARE A AND B / Product Type: Medicare A & B Fee for Service /   5 of 10 visits-30 day hold

## 2021-01-20 ENCOUNTER — TELEPHONE (OUTPATIENT)
Dept: DISCHARGE UNIT | Facility: HOSPITAL | Age: 71
End: 2021-01-20

## 2021-01-22 ENCOUNTER — APPOINTMENT (OUTPATIENT)
Dept: SPEECH THERAPY | Facility: CLINIC | Age: 71
End: 2021-01-22
Payer: MEDICARE

## 2021-01-22 ENCOUNTER — APPOINTMENT (OUTPATIENT)
Dept: OCCUPATIONAL THERAPY | Facility: CLINIC | Age: 71
End: 2021-01-22
Payer: MEDICARE

## 2021-01-25 ENCOUNTER — APPOINTMENT (OUTPATIENT)
Dept: OCCUPATIONAL THERAPY | Facility: CLINIC | Age: 71
End: 2021-01-25
Payer: MEDICARE

## 2021-01-25 ENCOUNTER — APPOINTMENT (OUTPATIENT)
Dept: SPEECH THERAPY | Facility: CLINIC | Age: 71
End: 2021-01-25
Payer: MEDICARE

## 2021-01-25 PROCEDURE — 93306 TTE W/DOPPLER COMPLETE: CPT | Performed by: INTERNAL MEDICINE

## 2021-01-27 DIAGNOSIS — I25.5 ISCHEMIC CARDIOMYOPATHY: ICD-10-CM

## 2021-01-27 RX ORDER — METOPROLOL SUCCINATE 50 MG/1
50 TABLET, EXTENDED RELEASE ORAL DAILY
Qty: 30 TABLET | Refills: 11 | Status: SHIPPED | OUTPATIENT
Start: 2021-01-27

## 2021-01-29 ENCOUNTER — APPOINTMENT (OUTPATIENT)
Dept: SPEECH THERAPY | Facility: CLINIC | Age: 71
End: 2021-01-29
Payer: MEDICARE

## 2021-01-29 ENCOUNTER — APPOINTMENT (OUTPATIENT)
Dept: OCCUPATIONAL THERAPY | Facility: CLINIC | Age: 71
End: 2021-01-29
Payer: MEDICARE

## 2021-02-22 ENCOUNTER — TELEPHONE (OUTPATIENT)
Dept: SPEECH THERAPY | Facility: CLINIC | Age: 71
End: 2021-02-22

## 2021-02-22 NOTE — PROGRESS NOTES
Patient reached completion of 30 day hold and stated that he feels as though he does not need to return to therapy at this time  Should he wish to return to therapy in the future, a new script would be warranted at that time

## 2021-02-22 NOTE — TELEPHONE ENCOUNTER
Spoke with Zeus Marsh via phone call regarding POC/30 Day Hold for ST and OT  She stated she would discuss with Patient and get back to us

## 2021-03-09 ENCOUNTER — HOSPITAL ENCOUNTER (OUTPATIENT)
Dept: PULMONOLOGY | Facility: HOSPITAL | Age: 71
Discharge: HOME | End: 2021-03-09
Attending: INTERNAL MEDICINE
Payer: MEDICARE

## 2021-03-09 DIAGNOSIS — R06.00 DYSPNEA, UNSPECIFIED TYPE: ICD-10-CM

## 2021-03-09 PROCEDURE — 94729 DIFFUSING CAPACITY: CPT

## 2021-03-09 PROCEDURE — 94726 PLETHYSMOGRAPHY LUNG VOLUMES: CPT | Performed by: INTERNAL MEDICINE

## 2021-03-09 PROCEDURE — 94726 PLETHYSMOGRAPHY LUNG VOLUMES: CPT

## 2021-03-09 PROCEDURE — 94010 BREATHING CAPACITY TEST: CPT

## 2021-03-09 PROCEDURE — 94729 DIFFUSING CAPACITY: CPT | Performed by: INTERNAL MEDICINE

## 2021-03-09 PROCEDURE — 94760 N-INVAS EAR/PLS OXIMETRY 1: CPT

## 2021-03-09 PROCEDURE — 94010 BREATHING CAPACITY TEST: CPT | Performed by: INTERNAL MEDICINE

## 2021-03-10 DIAGNOSIS — I50.21 ACUTE SYSTOLIC HF (HEART FAILURE) (HCC): ICD-10-CM

## 2021-03-10 RX ORDER — POTASSIUM CHLORIDE 20 MEQ/1
20 TABLET, EXTENDED RELEASE ORAL DAILY
Qty: 30 TABLET | Refills: 3
Start: 2021-03-10

## 2021-03-10 NOTE — TELEPHONE ENCOUNTER
Babs Mead called and is requesting a med refill for Potassium, if approved please send to [facility], and If possible for a 90 day supply   The last ordering provider is Sara

## 2021-03-15 DIAGNOSIS — I50.21 ACUTE SYSTOLIC HF (HEART FAILURE) (HCC): ICD-10-CM

## 2021-03-15 RX ORDER — TORSEMIDE 20 MG/1
20 TABLET ORAL DAILY
Qty: 90 TABLET | Refills: 3
Start: 2021-03-15

## 2021-03-15 NOTE — TELEPHONE ENCOUNTER
Pt is requesting a med refill for torsemide, if approved please send to [facility]queen is the last ordering provider

## 2021-03-18 NOTE — PROGRESS NOTES
Pt called due to 30DH, pt not requesting to return to OP therapy services  Requesting to self d/c OT  All future appts removed and pt will req new script to return   D/c OT services

## 2021-03-19 ENCOUNTER — OFFICE VISIT (OUTPATIENT)
Dept: URGENT CARE | Facility: MEDICAL CENTER | Age: 71
End: 2021-03-19
Payer: MEDICARE

## 2021-03-19 ENCOUNTER — APPOINTMENT (OUTPATIENT)
Dept: RADIOLOGY | Facility: MEDICAL CENTER | Age: 71
End: 2021-03-19
Payer: MEDICARE

## 2021-03-19 VITALS
BODY MASS INDEX: 31.71 KG/M2 | WEIGHT: 221.5 LBS | SYSTOLIC BLOOD PRESSURE: 112 MMHG | TEMPERATURE: 98.1 F | HEART RATE: 77 BPM | DIASTOLIC BLOOD PRESSURE: 60 MMHG | HEIGHT: 70 IN

## 2021-03-19 DIAGNOSIS — S49.91XA INJURY OF RIGHT SHOULDER, INITIAL ENCOUNTER: ICD-10-CM

## 2021-03-19 DIAGNOSIS — S49.91XA INJURY OF RIGHT SHOULDER, INITIAL ENCOUNTER: Primary | ICD-10-CM

## 2021-03-19 PROCEDURE — G0463 HOSPITAL OUTPT CLINIC VISIT: HCPCS | Performed by: PHYSICIAN ASSISTANT

## 2021-03-19 PROCEDURE — 99213 OFFICE O/P EST LOW 20 MIN: CPT | Performed by: PHYSICIAN ASSISTANT

## 2021-03-19 PROCEDURE — 73030 X-RAY EXAM OF SHOULDER: CPT

## 2021-03-19 NOTE — PROGRESS NOTES
[address]        NAME: Natalee Cain is a 79 y o  male  : 1950    MRN: [id number]  DATE: 2021  TIME: 12:26 PM    Assessment and Plan   Injury of right shoulder, initial encounter [S49 91XA]  1  Injury of right shoulder, initial encounter  XR shoulder 2+ vw right    Ambulatory referral to Orthopedic Surgery       X-ray shows no acute fracture or dislocation  Patient has very restricted range of motion and there is concern for possible re-injury of the rotator cuff  Patient placed in sling and given referral to orthopedics  Recommended Tylenol for pain and icing  Patient Instructions     Tylenol for pain   Ice the shoulder   wear sling   follow-up with orthopedics  Follow up with PCP in 3-5 days  Proceed to  ER if symptoms worsen  Chief Complaint     Chief Complaint   Patient presents with    Shoulder Injury     x2 days ago fell on dog bone and when falling hit right shoulder on counter top in kitchen  pt stated that right arm stayed up as he was falling  pain is in front and back of shoulder into neck and down arm into fingers  has burning feeling  History of Present Illness         Patient is a 44-year-old male who presents today with complaints of right shoulder pain  He states he fell and tripped on a dog bone and fell and hit his right arm off the countertop well up in a internal abduction position 2 days ago  He does report abrasion to the back of forearm  Reports most pain is in the shoulder and radiates down the arm  He reports a burning in the upper arm but no numbness or tingling  Has limited ROM due to pain  History of right rotator cuff repair over a year ago for different injury  Review of Systems   Review of Systems   Constitutional: Negative for fever  Respiratory: Negative for shortness of breath  Cardiovascular: Negative for chest pain  Musculoskeletal: Positive for arthralgias  Negative for joint swelling     Skin: Negative for color change  Current Medications       Current Outpatient Medications:     albuterol (2 5 mg/3 mL) 0 083 % nebulizer solution, Take 2 5 mg by nebulization every 6 (six) hours as needed for wheezing , Disp: , Rfl:     apixaban (ELIQUIS) 5 mg, Take one tablet ( 5 mg) every 12 hours, Disp: 180 tablet, Rfl: 3    aspirin 81 mg chewable tablet, Chew 81 mg daily, Disp: , Rfl:     Cinnamon 500 MG capsule, Take 500 mg by mouth daily, Disp: , Rfl:     co-enzyme Q-10 50 MG capsule, Take 50 mg by mouth daily, Disp: , Rfl:     finasteride (PROSCAR) 5 mg tablet, Take 5 mg by mouth daily, Disp: , Rfl:     metFORMIN (GLUCOPHAGE) 500 mg tablet, Take 500 mg by mouth daily, Disp: , Rfl:     metoprolol succinate (TOPROL-XL) 50 mg 24 hr tablet, Take 1 tablet (50 mg total) by mouth daily, Disp: 30 tablet, Rfl: 11    potassium chloride (K-DUR,KLOR-CON) 20 mEq tablet, Take 1 tablet (20 mEq total) by mouth daily, Disp: 30 tablet, Rfl: 3    ramipril (ALTACE) 1 25 mg capsule, Take 1 25 mg by mouth daily  , Disp: , Rfl:     Red Yeast Rice Extract (RED YEAST RICE PO), Take 2 tablets by mouth 2 (two) times a day, Disp: , Rfl:     rizatriptan (MAXALT) 10 MG tablet, Take 10 mg by mouth once as needed for migraine   May repeat in 2 hours if needed, Disp: , Rfl:     torsemide (DEMADEX) 20 mg tablet, Take 1 tablet (20 mg total) by mouth daily, Disp: 90 tablet, Rfl: 3    vitamin B-12 (VITAMIN B-12) 1,000 mcg tablet, Take 5,000 mcg by mouth daily, Disp: , Rfl:     Current Allergies     Allergies as of 03/19/2021 - Reviewed 03/19/2021   Allergen Reaction Noted    Poultry meal  08/18/2016            The following portions of the patient's history were reviewed and updated as appropriate: allergies, current medications, past family history, past medical history, past social history, past surgical history and problem list      Past Medical History:   Diagnosis Date    Atypical chest pain     Back pain     [facility]) ([facility] )     Legionnaire's disease ([facility] )     Lung disorder     Migraine        Past Surgical History:   Procedure Laterality Date    ROTATOR CUFF REPAIR Bilateral     TONSILLECTOMY      TRIGGER FINGER RELEASE         History reviewed  No pertinent family history  Medications have been verified  Objective   /60   Pulse 77   Temp 98 1 °F (36 7 °C)   Ht 5' 10" (1 778 m)   Wt 100 kg (221 lb 8 oz)   BMI 31 78 kg/m²        Physical Exam     Physical Exam  Constitutional:       General: He is not in acute distress  Appearance: Normal appearance  He is not ill-appearing  Cardiovascular:      Rate and Rhythm: Normal rate and regular rhythm  Pulmonary:      Effort: Pulmonary effort is normal       Breath sounds: Normal breath sounds  Musculoskeletal:         General: Tenderness present  No swelling, deformity or signs of injury  Right shoulder: He exhibits decreased range of motion, tenderness, bony tenderness, pain and decreased strength  He exhibits no swelling, no effusion, no crepitus, no deformity, no laceration, no spasm and normal pulse  Arms:    Skin:     General: Skin is warm and dry  Neurological:      Mental Status: He is alert

## 2021-03-24 ENCOUNTER — OFFICE VISIT (OUTPATIENT)
Dept: OBGYN CLINIC | Facility: MEDICAL CENTER | Age: 71
End: 2021-03-24
Payer: MEDICARE

## 2021-03-24 VITALS
WEIGHT: 221 LBS | BODY MASS INDEX: 31.64 KG/M2 | DIASTOLIC BLOOD PRESSURE: 85 MMHG | HEIGHT: 70 IN | HEART RATE: 74 BPM | SYSTOLIC BLOOD PRESSURE: 134 MMHG

## 2021-03-24 DIAGNOSIS — M25.511 ACUTE PAIN OF RIGHT SHOULDER: Primary | ICD-10-CM

## 2021-03-24 PROCEDURE — 99204 OFFICE O/P NEW MOD 45 MIN: CPT | Performed by: PHYSICAL MEDICINE & REHABILITATION

## 2021-03-24 NOTE — PATIENT INSTRUCTIONS
You can obtain Voltaren (diclofenac) cream for your discomfort over the counter if it is not covered by insurance  This is now available over the counter at [employer] and online at [employer]  If it is more expensive to get it through prescription, it is better to get it over the counter  You can use this up to four times per day  It is best to wash the area with water and then pat dry  The cream absorbs better after this  Be careful not to let any pets or children get in contact with the cream as it is a medication and can be harmful to them  If you develop a skin reaction, stop using the cream     You will be starting physical therapy  It is important to do home exercises as given by your physical therapist as you go through PT to speed up your recovery  Physical therapy addresses the problems that are causing your pain, instead of covering them up, as some other treatment options do  We will see you back after completing physical therapy

## 2021-03-24 NOTE — PROGRESS NOTES
1  Acute pain of right shoulder  Diclofenac Sodium (VOLTAREN) 1 %    Ambulatory referral to Physical Therapy     Orders Placed This Encounter   Procedures    Ambulatory referral to Physical Therapy        Impression:  Right shoulder pain likely secondary to rotator cuff tear with DOI as 3/17/2021  The patient has had slight improvement since his traumatic injury  He has rotator cuff repair many years ago  We will see if he can improve with conservative treatment  He will start PT and can use Voltaren gel and Tylenol  I will see him back in 3-4 weeks to reassess  Patient is not optimistic about starting PT but I told him that he should be  If no improvement, MRI can be considered  The patient's pertinent emergency department/urgent care/referring physician's notes were reviewed  Imaging Studies (I personally reviewed images in PACS and report):  Right shoulder x-rays most recent to this encounter reviewed  These images show moderate osteoarthritis of the acromioclavicular joint and the glenohumeral joint  There is decreased acromial-humeral interval which could represent a complete rotator cuff tear  Return in about 4 weeks (around 4/21/2021)  HPI:  Marietta Keyes is a 79 y o  male  who presents for evaluation of   Chief Complaint   Patient presents with    Right Shoulder - Pain       Onset/Mechanism: Chronic pain that worsened on 3/17/2021 after he slipped on a dog bone and had external rotation stress to his shoulder  Location: Anterior shoulder  Radiation: Down the arm but not past the elbow  Provocative: Pain is worsened by moving it  Severity: Slightly improved  Associated Symptoms: Trouble fixing his roof  Treatment so far: No treatment  Review of Systems   Constitutional: Positive for activity change  Negative for fever  HENT: Negative for sore throat  Eyes: Negative for visual disturbance  Respiratory: Negative for shortness of breath      Cardiovascular: Negative for chest pain  Gastrointestinal: Negative for abdominal pain  Endocrine: Negative for polydipsia  Genitourinary: Negative for difficulty urinating  Musculoskeletal: Positive for arthralgias  Skin: Negative for rash  Allergic/Immunologic: Negative for immunocompromised state  Neurological: Negative for numbness  Hematological: Does not bruise/bleed easily  Psychiatric/Behavioral: Negative for confusion  Following history reviewed and updated:  Past Medical History:   Diagnosis Date    Atypical chest pain     Back pain     CHF (congestive heart failure) (HCC)     [facility]naire's [facility] ([facility] [address] )     Lung disorder     Migraine      Past Surgical History:   Procedure Laterality Date    ROTATOR CUFF REPAIR Bilateral     TONSILLECTOMY      TRIGGER FINGER RELEASE       Social History   Social History     Substance and Sexual Activity   Alcohol Use Yes    Frequency: Monthly or less     Social History     Substance and Sexual Activity   Drug Use Not Currently    Comment: CBD Oil     Social History     Tobacco Use   Smoking Status Former Smoker    Packs/day:     Years:     Pack years:     Types: Cigarettes, Cigars    Quit date:     Years since quittin 2   Smokeless Tobacco Never Used     History reviewed  No pertinent family history  Allergies   Allergen Reactions    Poultry Meal         Constitutional:  /85 (BP Location: Right arm, Patient Position: Sitting, Cuff Size: Standard)   Pulse 74   Ht 5' 10" (1 778 m)   Wt 100 kg (221 lb)   BMI 31 71 kg/m²    General: NAD  Eyes: Anicteric sclerae  Neck: Supple  Lungs: Unlabored breathing  Cardiovascular: No lower extremity edema  Skin: Intact without erythema  Neurologic: Sensation intact to light touch  Psychiatric: Mood and affect are appropriate  Right Shoulder Exam     Tenderness   The patient is experiencing tenderness in the acromion and biceps tendon      Range of Motion   Active abduction:  80 abnormal   Extension: abnormal   External rotation: abnormal   Forward flexion:  90 abnormal     Tests   Aldrich 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

## 2021-03-31 ENCOUNTER — EVALUATION (OUTPATIENT)
Dept: PHYSICAL THERAPY | Facility: MEDICAL CENTER | Age: 71
End: 2021-03-31
Payer: MEDICARE

## 2021-03-31 DIAGNOSIS — M25.511 ACUTE PAIN OF RIGHT SHOULDER: ICD-10-CM

## 2021-03-31 PROCEDURE — 97110 THERAPEUTIC EXERCISES: CPT | Performed by: PHYSICAL THERAPIST

## 2021-03-31 PROCEDURE — 97161 PT EVAL LOW COMPLEX 20 MIN: CPT | Performed by: PHYSICAL THERAPIST

## 2021-03-31 NOTE — PROGRESS NOTES
PT Evaluation     Today's date: 3/31/2021  Patient name: Evy Figueroa  : 1950  MRN: [id number]  Referring provider: [place], DO  Dx:   Encounter Diagnosis     ICD-10-CM    1  Acute pain of right shoulder  M25 511 Ambulatory referral to Physical Therapy       Start Time: 1157  Stop Time: 1245  Total time in clinic (min): 48 minutes    Assessment  Assessment details: Pt is a 79 y o male who presents with increased R shoulder pain, decreased R shoulder strength and decreased activity tolerance secondary to increased symptoms  These impairments limit the patient from participating in ADLs at [facility], decreased ability to perform house hold duties such as yard work, and inability to hold and shoot a gun  Pt reports that he does not believe PT will help his symptoms  Extra time was taken to educate the patient on his symptoms and how PT can benefit his shoulder  I believe this patient is a good candidate for and will benefit from skilled physical therapy for manual therapy to the R shoulder, R shoulder ROM exercises, R shoulder strengthening exercises and postural exercises to improve symptoms and assist the patient to return to PLOF      Positive Prognostic Indicators: desire to improve    Negative Prognostic Indicators: attitude towards PT, co-morbidities  Impairments: abnormal or restricted ROM, abnormal movement, activity intolerance, impaired physical strength, lacks appropriate home exercise program and poor posture     Symptom irritability: moderateUnderstanding of Dx/Px/POC: good   Prognosis: good    Goals  STGs: 4 weeks  1) Pt will have SPR decrease of 2 units at rest  2) pt will have improved R shoulder flexion AROM to 90*+  3) pt will have improved foto score of 10 points    LTGs: 8 weeks  1) pt will be independent with HEP by D/C  2) pt will be independent with symptom management by D/C  3) pt will report improved tolerance to reaching above shoulder height with no more than 2/10 pain in the shoulder in order to have improved functional ability by DC  Plan  Patient would benefit from: skilled physical therapy  Planned modality interventions: cryotherapy and thermotherapy: hydrocollator packs  Planned therapy interventions: joint mobilization, manual therapy, neuromuscular re-education, postural training, strengthening, stretching, therapeutic activities, therapeutic exercise, home exercise program and functional ROM exercises  Frequency: 2x week  Duration in weeks: 6  Plan of Care beginning date: 3/31/2021  Plan of Care expiration date: 5/12/2021  Treatment plan discussed with: patient        Subjective Evaluation    History of Present Illness  Mechanism of injury: DOO:  ILAN:      Subjective Comments: pt repots torn RTC in both shoulders  Last July, hegot a dog which pulled after an other dog  This pulled on R side, but he let it go  Xray was completed  He noticed increased difficulty using his arm  Pt notes progressive improvement over time  Pt then reports that he was walking, but slipped on dog bone  His R arm caught the counter, but he fell through it  This caused increased pain in the shoulder and arm  He reports that he can complete activity below waist height, but above waist is very difficult  He has increased difficulty with driving and buckling  Pt is using topical creme and tylenol which are not helpful  Denies N&T in the arm  Pt note alcides when reaching in all directions  Pt notes that he had a CVA recently  R hand dominate       Pain   Rest: 7-8/10 with light activity   Best: 2-3/10- constant   Worst:10+/10 if he hurts it    Relieving Factors: heat and other topicals    Exacerbating Factors: movement    Sleeping: has to sleep in the recliner, depends on where he puts his arm    Home Set-up: reaching up is difficult    ADLs: compensates with dressing, holding things are difficulty    Work/Hobbies: retired, has a gun dealer    Previous Treatment: topicals, rest    Goals:  Pt wants to avoid surgery            Objective     Static Posture     Head  Forward  Shoulders  Rounded  Palpation     Right   Tenderness of the anterior deltoid, biceps, infraspinatus, middle deltoid, supraspinatus and upper trapezius  Tenderness     Right Shoulder  Tenderness in the supraspinatus tendon       Active Range of Motion   Left Shoulder   Flexion: 155 degrees   Abduction: 102 degrees   External rotation BTH: T8   Internal rotation BTB: T12     Right Shoulder   Flexion: 65 degrees with pain  Abduction: 70 degrees with pain  External rotation BTH: T4 with pain  Internal rotation BTB: T12 with pain    Passive Range of Motion     Right Shoulder   Flexion: 135 degrees with pain  Abduction: 90 degrees with pain  External rotation 45°: 50 degrees   Internal rotation 45°: 60 degrees     Strength/Myotome Testing     Right Shoulder     Planes of Motion   Flexion: 3-   Abduction: 3-     Additional Strength Details  Pt is unable to reach R UE to 90* against gravity              Precautions: DM, Moldy-hay disease, pleural effusion, CHF, HX CVA, cardiomyopathy, Hx LBP, HX b/l RTC repair; COLD SENSITIVITY      Manuals 3/31            R shoulder PROM RK                                                   Neuro Re-Ed             scap retract             isometrics                                                                              Ther Ex             Table slides             pendulums             pulleys             Supine cane flexion             Supine cane ER             Wall slides                                       Ther Activity                                       Gait Training                                       Modalities

## 2021-04-05 RX ORDER — SPIRONOLACTONE 25 MG/1
12.5 TABLET ORAL 2 TIMES DAILY
COMMUNITY
Start: 2021-01-04

## 2021-04-06 ENCOUNTER — OFFICE VISIT (OUTPATIENT)
Dept: PULMONOLOGY | Facility: CLINIC | Age: 71
End: 2021-04-06
Payer: MEDICARE

## 2021-04-06 VITALS
TEMPERATURE: 97.5 F | BODY MASS INDEX: 31.55 KG/M2 | HEIGHT: 70 IN | DIASTOLIC BLOOD PRESSURE: 82 MMHG | WEIGHT: 220.4 LBS | OXYGEN SATURATION: 94 % | HEART RATE: 81 BPM | SYSTOLIC BLOOD PRESSURE: 110 MMHG | RESPIRATION RATE: 16 BRPM

## 2021-04-06 DIAGNOSIS — R06.00 DYSPNEA, UNSPECIFIED TYPE: Primary | ICD-10-CM

## 2021-04-06 PROCEDURE — 99213 OFFICE O/P EST LOW 20 MIN: CPT | Performed by: INTERNAL MEDICINE

## 2021-04-06 RX ORDER — ZINC GLUCONATE 50 MG
50 TABLET ORAL DAILY
COMMUNITY

## 2021-04-06 RX ORDER — MULTIVIT WITH MINERALS/LUTEIN
1000 TABLET ORAL DAILY
COMMUNITY

## 2021-04-06 NOTE — PROGRESS NOTES
Pulmonary Follow Up Note   Donell López 79 y o  male MRN: [id number]  4/6/2021      Assessment/Plan:    Dyspnea, unspecified type  - Suspect that most of his previous dyspnea was cardiac in etiology with intermittent episodes of volume overload  He reports that ever since he is been on diuretics, his breathing status has significantly improved and has remained stable  - He has not needed to use his rescue nebulization since last year  -we have obtained pulmonary function tests which are completely normal  No need for inhaler therapy at this time  -2D echocardiogram from January reveals a further reduced ejection fraction of 40%, grade 2 diastolic dysfunction, moderate mitral regurgitation,   -Recommend to continue his current cardiac regimen including his  Torsemide  He has scheduled cardiology follow-up at the end of this month  -  Will not schedule him specific follow up, we are always available if further specific pulmonary questions arise    Return if symptoms worsen or fail to improve  History of Present Illness   HPI:  Donell López is a 79 y o  male who presents today for follow up  Last seen in January as an evaluation for recurrent bronchitis  He is also a former smoker, began smoking at age 12, quit in late 76s, began smoking again on and off 80s-90s, but quit in the late 80s  Has not smoked cigarettes since then but does report he occasionally smoked cigars in 2000s but hasnt in many years  In addition to [employer], he has a past occupational hisotry of 15yrs working at a [id number]  Denies travel, denies sick contacts  Since his last visit, he says his breathing has been overall very stable  He says that he has not used his rescue nebulization at all  He unforuntely did have a mechanical fall since our last appointment while he was repairing his home    He says that his primary symptoms are with fluid overload and ever since the initiation of his appropriate cardiac regimen, he has felt much improved  We did send him for pulmonary function tests which are completely normal   His repeat 2D echocardiogram from January shows a further reduction in EF to 25%, mildly dilated RV and grade 2 diastolic dysfunction    Review of Systems   Constitutional: Negative for activity change, chills and fever  HENT: Negative for congestion, rhinorrhea, sneezing and voice change  Respiratory: Negative for cough, shortness of breath and wheezing  Cardiovascular: Negative for chest pain and palpitations  Gastrointestinal: Negative for abdominal distention, abdominal pain, diarrhea, nausea and vomiting  Endocrine: Negative for cold intolerance and heat intolerance  Musculoskeletal: Negative for arthralgias, back pain, myalgias and neck stiffness  Skin: Negative for color change, pallor and rash  Neurological: Negative for dizziness, weakness and numbness  Psychiatric/Behavioral: Negative for agitation  The patient is not nervous/anxious  Historical Information   Past Medical History:   Diagnosis Date    Atypical chest pain     Back pain     [facility])     Legionnaire's disease ([facility] [address] )     Lung disorder     Migraine      Past Surgical History:   Procedure Laterality Date    ROTATOR CUFF REPAIR Bilateral     TONSILLECTOMY      TRIGGER FINGER RELEASE       History reviewed  No pertinent family history        Meds/Allergies     Current Outpatient Medications:     apixaban (ELIQUIS) 5 mg, Take one tablet ( 5 mg) every 12 hours, Disp: 180 tablet, Rfl: 3    Ascorbic Acid (vitamin C) 1000 MG tablet, Take 1,000 mg by mouth daily, Disp: , Rfl:     aspirin 81 mg chewable tablet, Chew 81 mg daily, Disp: , Rfl:     Cinnamon 500 MG capsule, Take 500 mg by mouth daily, Disp: , Rfl:     co-enzyme Q-10 50 MG capsule, Take 50 mg by mouth daily, Disp: , Rfl:     Diclofenac Sodium (VOLTAREN) 1 %, Apply 2 g topically 3 (three) times a day as needed (Pain), Disp: 1 Tube, Rfl: 0    finasteride (PROSCAR) 5 mg tablet, Take 5 mg by mouth daily, Disp: , Rfl:     metFORMIN (GLUCOPHAGE) 500 mg tablet, Take 500 mg by mouth daily, Disp: , Rfl:     metoprolol succinate (TOPROL-XL) 50 mg 24 hr tablet, Take 1 tablet (50 mg total) by mouth daily, Disp: 30 tablet, Rfl: 11    potassium chloride (K-DUR,KLOR-CON) 20 mEq tablet, Take 1 tablet (20 mEq total) by mouth daily, Disp: 30 tablet, Rfl: 3    ramipril (ALTACE) 1 25 mg capsule, Take 1 25 mg by mouth daily  , Disp: , Rfl:     Red Yeast Rice Extract (RED YEAST RICE PO), Take 2 tablets by mouth 2 (two) times a day, Disp: , Rfl:     rizatriptan (MAXALT) 10 MG tablet, Take 10 mg by mouth once as needed for migraine  May repeat in 2 hours if needed, Disp: , Rfl:     torsemide (DEMADEX) 20 mg tablet, Take 1 tablet (20 mg total) by mouth daily, Disp: 90 tablet, Rfl: 3    vitamin B-12 (VITAMIN B-12) 1,000 mcg tablet, Take 5,000 mcg by mouth daily, Disp: , Rfl:     zinc gluconate 50 mg tablet, Take 50 mg by mouth daily, Disp: , Rfl:     albuterol (2 5 mg/3 mL) 0 083 % nebulizer solution, Take 2 5 mg by nebulization every 6 (six) hours as needed for wheezing , Disp: , Rfl:     spironolactone (ALDACTONE) 25 mg tablet, Take 12 5 mg by mouth 2 (two) times a day, Disp: , Rfl:   Allergies   Allergen Reactions    Poultry Meal - Food Allergy        Vitals: Blood pressure 110/82, pulse 81, temperature 97 5 °F (36 4 °C), temperature source Temporal, resp  rate 16, height 5' 10" (1 778 m), weight 100 kg (220 lb 6 4 oz), SpO2 94 %  Body mass index is 31 62 kg/m²  Oxygen Therapy  SpO2: 94 %  Oxygen Therapy: None (Room air)      Physical Exam  Constitutional:       Appearance: Normal appearance  HENT:      Head: Normocephalic and atraumatic  Nose: Nose normal       Mouth/Throat:      Mouth: Mucous membranes are moist       Pharynx: Oropharynx is clear  Neck:      Musculoskeletal: Normal range of motion and neck supple     Cardiovascular: Rate and Rhythm: Normal rate and regular rhythm  Pulses: Normal pulses  Heart sounds: Normal heart sounds  No murmur  Pulmonary:      Effort: Pulmonary effort is normal  No respiratory distress  Breath sounds: Normal breath sounds  No wheezing  Abdominal:      General: Abdomen is flat  There is no distension  Palpations: Abdomen is soft  Tenderness: There is no abdominal tenderness  Musculoskeletal:         General: No swelling or tenderness  Right lower leg: No edema  Left lower leg: No edema  Skin:     General: Skin is warm and dry  Findings: No rash  Neurological:      General: No focal deficit present  Mental Status: He is alert and oriented to person, place, and time  Psychiatric:         Mood and Affect: Mood normal          Behavior: Behavior normal          Labs: I have personally reviewed pertinent lab results  Lab Results   Component Value Date    WBC 5 82 11/25/2020    HGB 14 2 11/25/2020    HCT 43 3 11/25/2020    MCV 94 11/25/2020     11/25/2020     Lab Results   Component Value Date    CALCIUM 9 2 11/25/2020    K 3 6 11/25/2020    CO2 25 11/25/2020     11/25/2020    BUN 20 11/25/2020    CREATININE 1 09 11/25/2020     No results found for: IGE  Lab Results   Component Value Date    ALT 20 11/25/2020    AST 13 11/25/2020    ALKPHOS 45 (L) 11/25/2020         Imaging and other studies: I have personally reviewed pertinent reports  and I have personally reviewed pertinent films in PACS    CXR 12/21/20: No acute cardiopulmonary disease  Pulmonary function testing:   FEV1/FVC Ratio: 75 %  Forced Vital Capacity: 5 27 L    119 % predicted  FEV1: 3 97 L     121 % predicted     Lung volumes by body plethysmography: Total Lung Capacity 114 % predicted Residual volume 108 % predicted     DLCO corrected for patients hemoglobin level: 79 %  EKG, Pathology, and Other Studies: I have personally reviewed pertinent reports     and I have personally reviewed pertinent films in PACS      [facility]

## 2021-04-07 ENCOUNTER — OFFICE VISIT (OUTPATIENT)
Dept: PHYSICAL THERAPY | Facility: MEDICAL CENTER | Age: 71
End: 2021-04-07
Payer: MEDICARE

## 2021-04-07 DIAGNOSIS — M25.511 ACUTE PAIN OF RIGHT SHOULDER: Primary | ICD-10-CM

## 2021-04-07 PROCEDURE — 97110 THERAPEUTIC EXERCISES: CPT | Performed by: PHYSICAL THERAPIST

## 2021-04-07 PROCEDURE — 97140 MANUAL THERAPY 1/> REGIONS: CPT | Performed by: PHYSICAL THERAPIST

## 2021-04-07 NOTE — PROGRESS NOTES
Daily Note     Today's date: 2021  Patient name: Alfonso Samaniego  : 1950  MRN: [id number]  Referring provider: Hipolito Wilson DO  Dx:   Encounter Diagnosis     ICD-10-CM    1  Acute pain of right shoulder  M25 511        Start Time: 1057  Stop Time: 1120  Total time in clinic (min): 23 minutes    Subjective: pt reports that on Monday and Tuesday of this weel, he felt he had no strength in his R shoulder  Objective: See treatment diary below      Assessment: Tolerated treatment poor  Pt completed pulley exercise with minimal complaints of shoulder discomfort  Pt tolerated PROM fair but had increased pain with abduction movements  Trial of AAROM with cane was completed but caused immediate sharp pain in R shoulder  Pt deferred continuing exercise  Isometrics were also attempted with flexion being tolerable and abduction increased shoulder pain  Following this, pt had increased pain with all other exercises attempted  Treatment session was DC secondary to increase in symptoms  Pt deferred CP stating that this would also increase pain  We will attempt to progress pt NV if symptoms resolve back to baseline  Patient would benefit from continued PT      Plan: Continue per plan of care  Precautions: DM, Moldy-hay disease, pleural effusion, CHF, HX CVA, cardiomyopathy, Hx LBP, HX b/l RTC repair; COLD SENSITIVITY      Manuals 3/31 4/7           R shoulder PROM RK RK                                                  Neuro Re-Ed             scap retract             isometrics  5"x10 flexion p! Ther Ex             Table slides             pendulums  x20 circles p!           pulleys  5'           Supine cane flexion  np p!            Supine cane ER             Wall slides                                       Ther Activity                                       Gait Training                                       Modalities

## 2021-04-09 ENCOUNTER — OFFICE VISIT (OUTPATIENT)
Dept: PHYSICAL THERAPY | Facility: MEDICAL CENTER | Age: 71
End: 2021-04-09
Payer: MEDICARE

## 2021-04-09 DIAGNOSIS — M25.511 ACUTE PAIN OF RIGHT SHOULDER: Primary | ICD-10-CM

## 2021-04-09 PROCEDURE — 97140 MANUAL THERAPY 1/> REGIONS: CPT | Performed by: PHYSICAL THERAPIST

## 2021-04-09 PROCEDURE — 97110 THERAPEUTIC EXERCISES: CPT | Performed by: PHYSICAL THERAPIST

## 2021-04-09 NOTE — PROGRESS NOTES
Daily Note     Today's date: 2021  Patient name: Xavier Fregoso  : 1950  MRN: [id number]  Referring provider: Karis Bearden DO  Dx:   Encounter Diagnosis     ICD-10-CM    1  Acute pain of right shoulder  M25 511        Start Time: 1100  Stop Time: 1130  Total time in clinic (min): 30 minutes    Subjective: pt reports to therapy stating that his shoulder is "nto a whole lot better than last time"  Rates shoulder pain upon arrival at 6/10  Pt notes that he feels his shoulder is getting progressively worse  Objective: See treatment diary below      Assessment: Tolerated treatment well  Pt continues to have pain with PROM most notably into flexion and ER  ER PROM limited to about 10* due to pain  Pt tolerated treatment better this session being able to accomplish isometrics and wall slide exercises  HEP progressed  Pt educated about symptoms and what to do when symptoms are high in terms of HEP completion  We will continue to progress as tolerated  Patient would benefit from continued PT      Plan: Continue per plan of care  Precautions: DM, Moldy-hay disease, pleural effusion, CHF, HX CVA, cardiomyopathy, Hx LBP, HX b/l RTC repair; COLD SENSITIVITY      Manuals 3/31 4/7 4/9          R shoulder PROM RK RK RK                                                 Neuro Re-Ed             scap retract   5" x10          isometrics  5"x10 flexion p! 5"x10 flex and ext                                                                           Ther Ex             Table slides   x10 flex and abd          pendulums  x20 circles p! x20 cirles          pulleys  5' 5'          Supine cane flexion  np p!            Supine cane ER             Wall slides   x10                                    Ther Activity                                       Gait Training                                       Modalities

## 2021-04-16 ENCOUNTER — OFFICE VISIT (OUTPATIENT)
Dept: PHYSICAL THERAPY | Facility: MEDICAL CENTER | Age: 71
End: 2021-04-16
Payer: MEDICARE

## 2021-04-16 DIAGNOSIS — M25.511 ACUTE PAIN OF RIGHT SHOULDER: Primary | ICD-10-CM

## 2021-04-16 PROCEDURE — 97140 MANUAL THERAPY 1/> REGIONS: CPT | Performed by: PHYSICAL THERAPIST

## 2021-04-16 PROCEDURE — 97112 NEUROMUSCULAR REEDUCATION: CPT | Performed by: PHYSICAL THERAPIST

## 2021-04-16 PROCEDURE — 97110 THERAPEUTIC EXERCISES: CPT | Performed by: PHYSICAL THERAPIST

## 2021-04-16 NOTE — PROGRESS NOTES
Daily Note     Today's date: 2021  Patient name: Unruly Marroquin  : 1950  MRN: [id number]  Referring provider: Todd Marinelli DO  Dx:   Encounter Diagnosis     ICD-10-CM    1  Acute pain of right shoulder  M25 511                   Subjective: pt reports pain remains unchanged  Objective: See treatment diary below      Assessment: Tolerated treatment well  PROM ER better than previous session  Patient would benefit from continued PT  Pain with most exercises  Plan: Continue per plan of care  Precautions: DM, Moldy-hay disease, pleural effusion, CHF, HX CVA, cardiomyopathy, Hx LBP, HX b/l RTC repair; COLD SENSITIVITY      Manuals 3/31 4/7 4/9 4/16         R shoulder PROM RK RK RK AK                                                Neuro Re-Ed             scap retract   5" x10 5" 15x         isometrics  5"x10 flexion p! 5"x10 flex and ext 5" 10x flex and ext                                                                          Ther Ex             Table slides   x10 flex and abd 5" 10x flx/ abd         pendulums  x20 circles p! x20 cirles x20 circles         pulleys  5' 5' 5'         Supine cane flexion  np p!            Supine cane ER             Wall slides   x10 x10                                   Ther Activity                                       Gait Training                                       Modalities

## 2021-04-21 ENCOUNTER — OFFICE VISIT (OUTPATIENT)
Dept: PHYSICAL THERAPY | Facility: MEDICAL CENTER | Age: 71
End: 2021-04-21
Payer: MEDICARE

## 2021-04-21 ENCOUNTER — OFFICE VISIT (OUTPATIENT)
Dept: OBGYN CLINIC | Facility: MEDICAL CENTER | Age: 71
End: 2021-04-21
Payer: MEDICARE

## 2021-04-21 VITALS
HEIGHT: 70 IN | DIASTOLIC BLOOD PRESSURE: 79 MMHG | SYSTOLIC BLOOD PRESSURE: 117 MMHG | HEART RATE: 73 BPM | WEIGHT: 220 LBS | BODY MASS INDEX: 31.5 KG/M2

## 2021-04-21 DIAGNOSIS — M25.511 ACUTE PAIN OF RIGHT SHOULDER: Primary | ICD-10-CM

## 2021-04-21 PROCEDURE — 99213 OFFICE O/P EST LOW 20 MIN: CPT | Performed by: PHYSICAL MEDICINE & REHABILITATION

## 2021-04-21 PROCEDURE — 97110 THERAPEUTIC EXERCISES: CPT | Performed by: PHYSICAL THERAPIST

## 2021-04-21 PROCEDURE — 97112 NEUROMUSCULAR REEDUCATION: CPT | Performed by: PHYSICAL THERAPIST

## 2021-04-21 PROCEDURE — 20610 DRAIN/INJ JOINT/BURSA W/O US: CPT | Performed by: PHYSICAL MEDICINE & REHABILITATION

## 2021-04-21 PROCEDURE — 97140 MANUAL THERAPY 1/> REGIONS: CPT | Performed by: PHYSICAL THERAPIST

## 2021-04-21 RX ORDER — TRIAMCINOLONE ACETONIDE 40 MG/ML
80 INJECTION, SUSPENSION INTRA-ARTICULAR; INTRAMUSCULAR
Status: COMPLETED | OUTPATIENT
Start: 2021-04-21 | End: 2021-04-21

## 2021-04-21 RX ORDER — LIDOCAINE HYDROCHLORIDE 10 MG/ML
3 INJECTION, SOLUTION INFILTRATION; PERINEURAL
Status: COMPLETED | OUTPATIENT
Start: 2021-04-21 | End: 2021-04-21

## 2021-04-21 RX ADMIN — LIDOCAINE HYDROCHLORIDE 3 ML: 10 INJECTION, SOLUTION INFILTRATION; PERINEURAL at 09:56

## 2021-04-21 RX ADMIN — TRIAMCINOLONE ACETONIDE 80 MG: 40 INJECTION, SUSPENSION INTRA-ARTICULAR; INTRAMUSCULAR at 09:56

## 2021-04-21 NOTE — PROGRESS NOTES
Daily Note     Today's date: 2021  Patient name: Erika Brady  : 1950  MRN: [id number]  Referring provider: Dunia Suh DO  Dx:   Encounter Diagnosis     ICD-10-CM    1  Acute pain of right shoulder  M25 511                   Subjective: The patient states that he saw the orthopedic doctor this morning and he got a shot in his shoulder which he noticed has already helped with the pain  He is to continue with PT and will be going back to see the doctor in another 4 weeks  Objective: See treatment diary below  Assessment: Good tolerance to all TE today  He demonstrates decreased A/PROM in his shoulder and has pain and end range for all planes with stretching  He also demonstrates weakness t/o his shoulder  Continued PT would be beneficial to improve function  Plan: Continue per plan of care  Progress as able in upcoming visits  Precautions: DM, Moldy-hay disease, pleural effusion, CHF, HX CVA, cardiomyopathy, Hx LBP, HX b/l RTC repair; COLD SENSITIVITY      Manuals 3/31 4/7 4/9 4/16 4/21        R shoulder PROM RK RK RK AK ML                                               Neuro Re-Ed             scap retract   5" x10 5" 15x 5"x15        isometrics  5"x10 flexion p! 5"x10 flex and ext 5" 10x flex and ext 5" x 10 ea  Flex/Ext/ER                                                                         Ther Ex             Table slides   x10 flex and abd 5" 10x flx/ abd 5" x 10   Flex/Abd        pendulums  x20 circles p! x20 cirles x20 circles circles 20x        pulleys  5' 5' 5' 5'        Supine cane flexion  np p!            Supine cane ER             Wall slides   x10 x10 10x                                  Ther Activity                                       Gait Training                                       Modalities

## 2021-04-21 NOTE — PROGRESS NOTES
1  Acute pain of right shoulder       Orders Placed This Encounter   Procedures    Large joint arthrocentesis        Impression:  Patient is here in follow up of right shoulder pain likely secondary to rotator cuff tear with DOI as 3/17/2021  Although the patient remains pessimistic, he notes improvement in his shoulder ROM  He still has the same amount of pain  We decided to proceed with a subacromial space steroid injection to help with pain  He will continue with physical therapy for another 3-4 weeks  If after four weeks, he continues to have symptoms, he will call me and I will order an MRI of his shoulder  Otherwise, return to clinic as needed  Imaging Studies (I personally reviewed images in PACS and report):  Right shoulder x-rays most recent to this encounter reviewed  These images show moderate osteoarthritis of the acromioclavicular joint and the glenohumeral joint  There is decreased acromial-humeral interval which could represent a complete rotator cuff tear  Return in about 4 weeks (around 5/19/2021), or if symptoms worsen or fail to improve  HPI:  Uziel Figueroa is a 79 y o  male  who presents in follow up  Here for   Chief Complaint   Patient presents with    Right Shoulder - Follow-up       Date of injury: Chronic pain that worsened on 3/17/2021 after he slipped on a dog bone and had external rotation stress to his shoulder  Trajectory of symptoms: See above  Review of Systems   Constitutional: Positive for activity change  Negative for fever  HENT: Negative for sore throat  Eyes: Negative for visual disturbance  Respiratory: Negative for shortness of breath  Cardiovascular: Negative for chest pain  Gastrointestinal: Negative for abdominal pain  Endocrine: Negative for polydipsia  Genitourinary: Negative for difficulty urinating  Musculoskeletal: Positive for arthralgias  Skin: Negative for rash     Allergic/Immunologic: Negative for immunocompromised state    Neurological: Negative for numbness  Hematological: Does not bruise/bleed easily  Psychiatric/Behavioral: Negative for confusion  Following history reviewed and updated:  Past Medical History:   Diagnosis Date    Atypical chest pain     Back pain     [facility]     Legionnaire's disease ([facility] [address] )     Lung disorder     Migraine      Past Surgical History:   Procedure Laterality Date    ROTATOR CUFF REPAIR Bilateral     TONSILLECTOMY      TRIGGER FINGER RELEASE       Social History   Social History     Substance and Sexual Activity   Alcohol Use Not Currently    Frequency: Monthly or less     Social History     Substance and Sexual Activity   Drug Use Not Currently    Comment: CBD Oil pt stated no longer using     Social History     Tobacco Use   Smoking Status Former Smoker    Packs/day: 1 00    Years: 25 00    Pack years: 25 00    Types: Cigarettes, Cigars    Quit date:     Years since quittin 3   Smokeless Tobacco Never Used     History reviewed  No pertinent family history  Allergies   Allergen Reactions    Poultry Meal - Food Allergy         Constitutional:  /79 (BP Location: Right arm, Patient Position: Sitting, Cuff Size: Standard)   Pulse 73   Ht 5' 10" (1 778 m)   Wt 99 8 kg (220 lb)   BMI 31 57 kg/m²    General: NAD  Eyes: Clear sclerae  ENT: No inflammation, lesion, or mass of lips  No tracheal deviation  Musculoskeletal: As mentioned below  Integumentary: No visible rashes or skin lesions  Pulmonary/Chest: Effort normal  No respiratory distress  Neuro: CN's grossly intact, BROWN  Psych: Normal affect and judgement  Vascular: WWP  Right Shoulder Exam     Tenderness   The patient is experiencing tenderness in the acromion      Range of Motion   Active abduction:  150 abnormal   Passive abduction: normal   Forward flexion:  160 abnormal     Tests   Aldrich test: positive  Impingement: positive    Other   Erythema: absent  Scars: absent  Sensation: normal  Pulse: present    Comments:  Injection site was CDI  Large joint arthrocentesis: R subacromial bursa  Universal Protocol:  Consent given by: patient  Timeout called at: 4/21/2021 9:55 AM   Site marked: the operative site was marked  Supporting Documentation  Indications: pain   Procedure Details  Location: shoulder - R subacromial bursa  Needle gauge: 21G 2''  Ultrasound guidance: no  Approach: posterolateral  Medications administered: 80 mg triamcinolone acetonide 40 mg/mL; 3 mL lidocaine 1 %    Patient tolerance: patient tolerated the procedure well with no immediate complications  Dressing:  Sterile dressing applied    Prior to the procedure, the patient was informed of the following risks in layman terms:    - Risk of bleeding since a needle is involved  - Risk of infection (1/10,000 chance as per recent studies)  Signs/symptoms were discussed and they would prompt an urgent evaluation at an emergency department   - Risk of pigmentation or skin dimpling in the skin (2-3% chance as per recent studies) from the steroid  - Risk of increased pain from steroid flare (1% chance as per recent studies) that typically lasts 24-48 hours  - Risk of increased blood sugars from the steroid medication that can last for a few weeks  If the patient is a diabetic or pre-diabetic, they were encouraged to closely monitor their blood sugars and discuss with PCP if elevated more than usual or if having symptoms  After going over these risks, we decided that the benefits outweigh the risks and proceeded with the procedure

## 2021-04-21 NOTE — PATIENT INSTRUCTIONS
You had a cortisone (steroid) injection  There are two medicines in this injection, a numbing medicine and a steroid  The numbing medication component usually takes effect within a few minutes and wears off after a few hours, after which your pain may return  The steroid medication typically takes effect in 3-5 days, although some people have benefits sooner, and lasts for a much longer time  Rules for limiting workout activity by pain symptoms:    -You can exercise through some pain, but keep it at a level from which you are distractible  -Do not change your biomechanics / form when exercising     -If you pay for your exercise later with substantial symptom exacerbation, you are not yet ready for that level of exertion

## 2021-04-27 ENCOUNTER — OFFICE VISIT (OUTPATIENT)
Dept: CARDIOLOGY CLINIC | Facility: CLINIC | Age: 71
End: 2021-04-27
Payer: MEDICARE

## 2021-04-27 VITALS
HEIGHT: 70 IN | SYSTOLIC BLOOD PRESSURE: 130 MMHG | HEART RATE: 80 BPM | TEMPERATURE: 98.4 F | WEIGHT: 220 LBS | OXYGEN SATURATION: 94 % | DIASTOLIC BLOOD PRESSURE: 74 MMHG | BODY MASS INDEX: 31.5 KG/M2

## 2021-04-27 DIAGNOSIS — E78.2 MIXED HYPERLIPIDEMIA: ICD-10-CM

## 2021-04-27 DIAGNOSIS — R06.00 DYSPNEA, UNSPECIFIED TYPE: ICD-10-CM

## 2021-04-27 DIAGNOSIS — I25.10 CORONARY ARTERY DISEASE INVOLVING NATIVE CORONARY ARTERY OF NATIVE HEART WITHOUT ANGINA PECTORIS: Primary | ICD-10-CM

## 2021-04-27 DIAGNOSIS — J90 PLEURAL EFFUSION: ICD-10-CM

## 2021-04-27 DIAGNOSIS — I42.0 DILATED CARDIOMYOPATHY (HCC): ICD-10-CM

## 2021-04-27 DIAGNOSIS — I50.22 CHRONIC SYSTOLIC CONGESTIVE HEART FAILURE (HCC): ICD-10-CM

## 2021-04-27 PROCEDURE — 99214 OFFICE O/P EST MOD 30 MIN: CPT | Performed by: INTERNAL MEDICINE

## 2021-04-27 NOTE — PROGRESS NOTES
Sarina Rodriguez  1950  [id number]  [facility]  [address]  [address]  [address] [id number] [phone]    1  Coronary artery disease involving native coronary artery of native heart without angina pectoris     2  Dilated cardiomyopathy ([address] )     3  Chronic systolic congestive heart failure ([address] )     4  Dyspnea, unspecified type     5  Mixed hyperlipidemia     6  Pleural effusion         Discussion/Summary:  1  NICM LVEF 25% severely dilated  2  CAD small D1 90%  3  Chronic systolic CHF II/C  4  HLD on Red rice yeast  5  DMtype 2   6  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    Recommendations:  Continue carvedilol and ramipril  The patient was unable to get Entresto due to cost    Patient had a recent CVA felt to be embolic continues on Eliquis  Ejection fraction remains reduced at 25% I spent quite a bit of time in the office talking to him about an implantable defibrillator  He does not want to go down that path  I talked him about the benefits of monitoring for long-term AFib  I do not think a loop recorder would be of much benefit now that we commended him to full anticoagulation  From a heart failure standpoint he is much more comfortable on the torsemide  Will continue aggressive medical therapy no further testing follow-up in 6 months  He will call me if he decides that he wants a defibrillator  Interval History:  79year-old patient presents for a new patient evaluation  He has a history of nonischemic cardiomyopathy dating back to 2015  Initial left heart catheterization showed small diagonal that was 90% but other coronary arteries were open  He does not have a history of heavy alcohol or drug use  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  He had 1 episode of decompensated heart failure 2015 but has been stable since then  He remains active around his house  He does not like to take many medications    He has been on red yeast rice in replacement of statin  He turned down a defibrillator years  Following a last visit the patient had a CVA at home waited about 4 days before coming in the hospital and then did not stay  A CTA showed a subacute infarct  He was placed on Eliquis for possible embolic nature given his cardiac history  Loop recorder was discussed but nothing was ever implanted  Since that time he has been feeling well and offers no further complaints  Denies any chest pain, shortness of breath, palpitations, lightheadedness, dizziness, or syncope  He had seen our Advanced practitioner Lasix was changed to torsemide his breathing has been much better since then    Problem List     Precordial pain    Chronic low back pain    Systolic congestive heart failure (HCC)    Wt Readings from Last 3 Encounters:   21 99 8 kg (220 lb)   21 99 8 kg (220 lb)   21 100 kg (220 lb 6 4 oz)                 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[address] )    EKG, abnormal    Cardiomyopathy ([facility] [address] )        Past Medical History:   Diagnosis Date    Atypical chest pain     Back pain     CHF (congestive heart failure) ([facility] [address] )     Legionnaire's disease ([facility] [address] )     Lung disorder     Migraine      Social History     Socioeconomic History    Marital status: /Civil Union     Spouse name: Not on file    Number of children: Not on file    Years of education: Not on file    Highest education level: Not on file   Occupational History    Not on file   Social Needs    Financial resource strain: Not on file    Food insecurity     Worry: Not on file     Inability: Not on file   [employer] needs     Medical: Not on file     Non-medical: Not on file   Tobacco Use    Smoking status: Former Smoker     Packs/day: 1 00     Years: 25 00     Pack years: 25 00     Types: Cigarettes, Cigars     Quit date:      Years since quittin 3    Smokeless tobacco: Never Used   Substance and Sexual Activity    Alcohol use: Yes     Frequency: Monthly or less    Drug use: Not Currently     Comment: CBD Oil pt stated no longer using    Sexual activity: Not on file   Lifestyle    Physical activity     Days per week: Not on file     Minutes per session: Not on file    Stress: Not on file   Relationships    Social connections     Talks on phone: Not on file     Gets together: Not on file     Attends Sabianism service: Not on file     Active member of club or organization: Not on file     Attends meetings of clubs or organizations: Not on file     Relationship status: Not on file    Intimate partner violence     Fear of current or ex partner: Not on file     Emotionally abused: Not on file     Physically abused: Not on file     Forced sexual activity: Not on file   Other Topics Concern    Not on file   Social History Narrative    Not on file      History reviewed  No pertinent family history    Past Surgical History:   Procedure Laterality Date    ROTATOR CUFF REPAIR Bilateral     TONSILLECTOMY      TRIGGER FINGER RELEASE         Current Outpatient Medications:     apixaban (ELIQUIS) 5 mg, Take one tablet ( 5 mg) every 12 hours, Disp: 180 tablet, Rfl: 3    Ascorbic Acid (vitamin C) 1000 MG tablet, Take 1,000 mg by mouth daily, Disp: , Rfl:     aspirin 81 mg chewable tablet, Chew 81 mg daily, Disp: , Rfl:     Cinnamon 500 MG capsule, Take 500 mg by mouth daily, Disp: , Rfl:     co-enzyme Q-10 50 MG capsule, Take 50 mg by mouth daily, Disp: , Rfl:     finasteride (PROSCAR) 5 mg tablet, Take 5 mg by mouth daily, Disp: , Rfl:     magnesium oxide (MAG-OX) 400 mg, Take 400 mg by mouth 2 (two) times a day, Disp: , Rfl:     metFORMIN (GLUCOPHAGE) 500 mg tablet, Take 500 mg by mouth daily, Disp: , Rfl:     metoprolol succinate (TOPROL-XL) 50 mg 24 hr tablet, Take 1 tablet (50 mg total) by mouth daily, Disp: 30 tablet, Rfl: 11    potassium chloride (K-DUR,KLOR-CON) 20 mEq tablet, Take 1 tablet (20 mEq total) by mouth daily, Disp: 30 tablet, Rfl: 3    ramipril (ALTACE) 1 25 mg capsule, Take 1 25 mg by mouth daily  , Disp: , Rfl:     Red Yeast Rice Extract (RED YEAST RICE PO), Take 2 tablets by mouth 2 (two) times a day, Disp: , Rfl:     rizatriptan (MAXALT) 10 MG tablet, Take 10 mg by mouth once as needed for migraine  May repeat in 2 hours if needed, Disp: , Rfl:     spironolactone (ALDACTONE) 25 mg tablet, Take 12 5 mg by mouth 2 (two) times a day, Disp: , Rfl:     torsemide (DEMADEX) 20 mg tablet, Take 1 tablet (20 mg total) by mouth daily, Disp: 90 tablet, Rfl: 3    vitamin B-12 (VITAMIN B-12) 1,000 mcg tablet, Take 5,000 mcg by mouth daily, Disp: , Rfl:     zinc gluconate 50 mg tablet, Take 50 mg by mouth daily, Disp: , Rfl:     albuterol (2 5 mg/3 mL) 0 083 % nebulizer solution, Take 2 5 mg by nebulization every 6 (six) hours as needed for wheezing , Disp: , Rfl:     Diclofenac Sodium (VOLTAREN) 1 %, Apply 2 g topically 3 (three) times a day as needed (Pain) (Patient not taking: Reported on 4/27/2021), Disp: 1 Tube, Rfl: 0  Allergies   Allergen Reactions    Poultry Meal - Food Allergy        Labs:     Chemistry        Component Value Date/Time    K 3 6 11/25/2020 1109     11/25/2020 1109    CO2 25 11/25/2020 1109    BUN 20 11/25/2020 1109    CREATININE 1 09 11/25/2020 1109        Component Value Date/Time    CALCIUM 9 2 11/25/2020 1109    ALKPHOS 45 (L) 11/25/2020 1109    AST 13 11/25/2020 1109    ALT 20 11/25/2020 1109            No results found for: CHOL  Lab Results   Component Value Date    HDL 42 11/25/2020    HDL 42 11/25/2020    HDL 37 (L) 08/19/2016     Lab Results   Component Value Date    LDLCALC 89 11/25/2020    LDLCALC 90 11/25/2020    LDLCALC 83 08/19/2016     Lab Results   Component Value Date    TRIG 105 11/25/2020    TRIG 106 11/25/2020    TRIG 190 (H) 08/19/2016     No results found for: CHOLHDL    Imaging: No results found  ECG:  Sinus tach      Review of Systems   Constitution: Negative  HENT: Negative  Eyes: Negative      Cardiovascular: Negative  Respiratory: Negative  Endocrine: Negative  Hematologic/Lymphatic: Negative  Skin: Negative  Musculoskeletal: Negative  Gastrointestinal: Negative  Genitourinary: Negative  Neurological: Negative  Psychiatric/Behavioral: Negative  All other systems reviewed and are negative  Vitals:    04/27/21 1242   BP: 130/74   Pulse: 80   Temp: 98 4 °F (36 9 °C)   SpO2: 94%     Vitals:    04/27/21 1242   Weight: 99 8 kg (220 lb)     Height: 5' 10" (177 8 cm)   Body mass index is 31 57 kg/m²  Physical Exam:  Vital signs reviewed  General:  Alert and cooperative, appears stated age, no acute distress  HEENT:  PERRLA, EOMI, no scleral icterus, no conjunctival pallor  Neck:  No lymphadenopathy, no thyromegaly, no carotid bruits, no elevated JVP  Heart:  Regular rate and rhythm, normal S1/S2, no S3/S4, no murmur, rubs or gallops  PMI nondisplaced  Lungs:  Clear to auscultation bilaterally, no wheezes rales or rhonchi  Abdomen:  Soft, non-tender, positive bowel sounds, no rebound or guarding,   no organomegaly   Extremities:  Normal range of motion    No clubbing, cyanosis or edema   Vascular:  2+ pedal pulses  Skin:  No rashes or lesions on exposed skin  Neurologic:  Cranial nerves II-XII grossly intact without focal deficits  Psych:  Normal mood and affect

## 2021-04-28 ENCOUNTER — OFFICE VISIT (OUTPATIENT)
Dept: PHYSICAL THERAPY | Facility: MEDICAL CENTER | Age: 71
End: 2021-04-28
Payer: MEDICARE

## 2021-04-28 DIAGNOSIS — M25.511 ACUTE PAIN OF RIGHT SHOULDER: Primary | ICD-10-CM

## 2021-04-28 PROCEDURE — 97140 MANUAL THERAPY 1/> REGIONS: CPT

## 2021-04-28 PROCEDURE — 97112 NEUROMUSCULAR REEDUCATION: CPT

## 2021-04-28 PROCEDURE — 97110 THERAPEUTIC EXERCISES: CPT

## 2021-04-28 NOTE — PROGRESS NOTES
Daily Note     Today's date: 2021  Patient name: Bulmaro Chappell  : 1950  MRN: [id number]  Referring provider: Macarena Snider DO  Dx:   Encounter Diagnosis     ICD-10-CM    1  Acute pain of right shoulder  M25 511        Start Time: 1345  Stop Time: 1425  Total time in clinic (min): 40 minutes    Subjective: Pt reports feeling slight improvement in symptoms since receiving injection from Dr Madeleine Sterling last week  Pt states he moved a heavy hand truck this morning and has recently increased his activity and lifting  Objective: See treatment diary below      Assessment: Tolerated treatment fair  Patient tolerates PROM well this session in flexion and abduction, significant guarding in external and internal rotation  Pt does state complaint of symptoms with all exercise however  Patient demonstrated fatigue post treatment and would benefit from continued PT      Plan: Continue per plan of care  Progress treatment as tolerated  Precautions: DM, Moldy-hay disease, pleural effusion, CHF, HX CVA, cardiomyopathy, Hx LBP, HX b/l RTC repair; COLD SENSITIVITY      Manuals 3/31 4/7 4/9 4/16 4/21 4/28       R shoulder PROM RK RK RK AK ML CC                                              Neuro Re-Ed             scap retract   5" x10 5" 15x 5"x15 5" x15       isometrics  5"x10 flexion p! 5"x10 flex and ext 5" 10x flex and ext 5" x 10 ea  Flex/Ext/ER 5" x 10 ea  Flex/Ext/ER                                                                        Ther Ex             Table slides   x10 flex and abd 5" 10x flx/ abd 5" x 10   Flex/Abd 5" x 10   Flex/Abd       pendulums  x20 circles p! x20 cirles x20 circles circles 20x circles 20x       pulleys  5' 5' 5' 5' 5'       Supine cane flexion  np p!            Supine cane ER             Wall slides   x10 x10 10x 10x                                 Ther Activity                                       Gait Training                                       Modalities

## 2021-04-30 ENCOUNTER — APPOINTMENT (OUTPATIENT)
Dept: PHYSICAL THERAPY | Facility: MEDICAL CENTER | Age: 71
End: 2021-04-30
Payer: MEDICARE

## 2021-05-05 ENCOUNTER — EVALUATION (OUTPATIENT)
Dept: PHYSICAL THERAPY | Facility: MEDICAL CENTER | Age: 71
End: 2021-05-05
Payer: MEDICARE

## 2021-05-05 DIAGNOSIS — M25.511 ACUTE PAIN OF RIGHT SHOULDER: Primary | ICD-10-CM

## 2021-05-05 PROCEDURE — 97110 THERAPEUTIC EXERCISES: CPT | Performed by: PHYSICAL THERAPIST

## 2021-05-05 PROCEDURE — 97112 NEUROMUSCULAR REEDUCATION: CPT | Performed by: PHYSICAL THERAPIST

## 2021-05-05 PROCEDURE — 97140 MANUAL THERAPY 1/> REGIONS: CPT | Performed by: PHYSICAL THERAPIST

## 2021-05-05 NOTE — PROGRESS NOTES
PT Re-Evaluation     Today's date: 2021  Patient name: Rj Rosenthal  : 1950  MRN: [id number]  Referring provider: Karthikeyan Dooley DO  Dx:   Encounter Diagnosis     ICD-10-CM    1  Acute pain of right shoulder  M25 511        Start Time: 1054  Stop Time: 1132  Total time in clinic (min): 38 minutes    Assessment  Assessment details: Pt is a 79 y o male who has completed 7 PT sessions to this date  The patient has made improvements in decreased pain levels, improved R shoulder ROM, improved R shoulder strength, and improved activity tolerance  However, the patient continues to have increased difficulty with increased pain levels with increased/prolonged heavy activity, continued limited AROM with increased pain with repetition, and decreased lifting and carrying tolerance  I believe this patient will continue to benefit from skilled PT for continued manual therapy to the R shoulder, continued R shoulder ROM exercises and continued R shoulder strengthening exercises to improve symptoms further and assist the patient to improved functional ability    Impairments: abnormal or restricted ROM, abnormal movement, activity intolerance, impaired physical strength, lacks appropriate home exercise program and poor posture     Symptom irritability: moderateUnderstanding of Dx/Px/POC: good   Prognosis: good    Goals  STGs: 4 weeks  1) Pt will have SPR decrease of 2 units at rest- met  2) pt will have improved R shoulder flexion AROM to 90*+- met  3) pt will have improved foto score of 10 points- not met    LTGs: 8 weeks  1) pt will be independent with HEP by D/C- part met  2) pt will be independent with symptom management by D/C- part met  3) pt will report improved tolerance to reaching above shoulder height with no more than 2/10 pain in the shoulder in order to have improved functional ability by DC - part met    Plan  Patient would benefit from: skilled physical therapy  Planned modality interventions: cryotherapy and thermotherapy: hydrocollator packs  Planned therapy interventions: joint mobilization, manual therapy, neuromuscular re-education, postural training, strengthening, stretching, therapeutic activities, therapeutic exercise, home exercise program and functional ROM exercises  Frequency: 2x week  Duration in weeks: 6  Plan of Care beginning date: 5/5/2021  Plan of Care expiration date: 6/16/2021  Treatment plan discussed with: patient        Subjective Evaluation    History of Present Illness  Mechanism of injury: Subjective Comments: pt reports that his shoulder is improving  He reports that he has improved tolerance to lifting and work around the house  He also notes improvements in his shoulder ROM as well  Pt reports that he feels about 95% better  Pain   Rest: 1-2/10 with light activity   Best: 0/10- constant   Worst: 2-3/10 if he hurts it            Objective     Static Posture     Head  Forward  Shoulders  Rounded  Palpation     Right   Tenderness of the anterior deltoid, biceps, middle deltoid, supraspinatus and upper trapezius  Tenderness     Right Shoulder  Tenderness in the supraspinatus tendon       Active Range of Motion   Left Shoulder   Flexion: 155 degrees   Abduction: 102 degrees   External rotation BTH: T8   Internal rotation BTB: T12     Right Shoulder   Flexion: 138 degrees with pain  Abduction: 145 degrees with pain  External rotation BTH: T4 with pain  Internal rotation BTB: L4 with pain    Passive Range of Motion     Right Shoulder   Flexion: 150 degrees with pain  Abduction: 150 degrees with pain  External rotation 45°: 75 degrees   Internal rotation 45°: 60 degrees     Strength/Myotome Testing     Right Shoulder     Planes of Motion   Flexion: 4+   Abduction: 4+   External rotation at 0°: 4+   Internal rotation at 0°: 4+              Precautions: DM, Moldy-hay disease, pleural effusion, CHF, HX CVA, cardiomyopathy, Hx LBP, HX b/l RTC repair; COLD SENSITIVITY      Manuals 3/31 4/7 4/9 4/16 4/21 4/28 5/5      R shoulder PROM RK RK RK AK ML CC RK + STM                                             Neuro Re-Ed             scap retract   5" x10 5" 15x 5"x15 5" x15       isometrics  5"x10 flexion p! 5"x10 flex and ext 5" 10x flex and ext 5" x 10 ea  Flex/Ext/ER 5" x 10 ea  Flex/Ext/ER 5" x 10 ea  Flex/Ext/ER      TB rows/ext       rtb x20 rows/ x10 ext      TB ER/IR       rtb 2x10 , gtb 2x10                                             Ther Ex             Table slides   x10 flex and abd 5" 10x flx/ abd 5" x 10   Flex/Abd 5" x 10   Flex/Abd       pendulums  x20 circles p! x20 cirles x20 circles circles 20x circles 20x       pulleys  5' 5' 5' 5' 5' 5'      Supine cane flexion  np p!     supine AROM x10      Supine cane ER             Wall slides   x10 x10 10x 10x x10                                Ther Activity                                       Gait Training                                       Modalities

## 2021-05-07 ENCOUNTER — OFFICE VISIT (OUTPATIENT)
Dept: PHYSICAL THERAPY | Facility: MEDICAL CENTER | Age: 71
End: 2021-05-07
Payer: MEDICARE

## 2021-05-07 DIAGNOSIS — M25.511 ACUTE PAIN OF RIGHT SHOULDER: Primary | ICD-10-CM

## 2021-05-07 PROCEDURE — 97110 THERAPEUTIC EXERCISES: CPT | Performed by: PHYSICAL THERAPIST

## 2021-05-07 PROCEDURE — 97112 NEUROMUSCULAR REEDUCATION: CPT | Performed by: PHYSICAL THERAPIST

## 2021-05-07 NOTE — PROGRESS NOTES
Daily Note     Today's date: 2021  Patient name: Wiley Yañez  : 1950  MRN: [id number]  Referring provider: Natalie Kariim DO  Dx:   Encounter Diagnosis     ICD-10-CM    1  Acute pain of right shoulder  M25 511        Start Time: 1100  Stop Time: 1132  Total time in clinic (min): 32 minutes    Subjective: pt states that his shoulder is doing well  He has no new complaints upon arrival        Objective: See treatment diary below      Assessment: Tolerated treatment well  Pt completed additional exercises this session with complaints of increased fatigue and minor increases of discomfort in the shoulder that was kept in a tolerable range  pt requested to complete session early secondary to fatigue  We will continue to progress as tolerated  Patient would benefit from continued PT      Plan: Continue per plan of care        Precautions: DM, Moldy-hay disease, pleural effusion, CHF, HX CVA, cardiomyopathy, Hx LBP, HX b/l RTC repair; COLD SENSITIVITY      Manuals 3/31 4/7 4     R shoulder PROM RK RK RK AK ML CC RK + STM RK                                            Neuro Re-Ed             scap retract   5" x10 5" 15x 5"x15 5" x15       isometrics  5"x10 flexion p! 5"x10 flex and ext 5" 10x flex and ext 5" x 10 ea  Flex/Ext/ER 5" x 10 ea  Flex/Ext/ER 5" x 10 ea  Flex/Ext/ER 5" x 10 ea  Flex/Ext/ER     TB rows/ext       rtb x20 rows/ x10 ext btb x20 rows      TB ER/IR       rtb 2x10 , gtb 2x10 btb IR 2x10;      TB horz abd        5" x20                               Ther Ex             Table slides   x10 flex and abd 5" 10x flx/ abd 5" x 10   Flex/Abd 5" x 10   Flex/Abd       pendulums  x20 circles p! x20 cirles x20 circles circles 20x circles 20x       pulleys  5' 5' 5' 5' 5' 5'      Supine cane flexion  np p!     supine AROM x10 supine AROM 2x10     Supine cane ER             Wall slides   x10 x10 10x 10x x10 x20     scaption against wall        #1 2x10     TOY rows        #5 KB 2x10                                            Ther Activity                                       Gait Training                                       Modalities

## 2021-05-12 ENCOUNTER — OFFICE VISIT (OUTPATIENT)
Dept: PHYSICAL THERAPY | Facility: MEDICAL CENTER | Age: 71
End: 2021-05-12
Payer: MEDICARE

## 2021-05-12 DIAGNOSIS — M25.511 ACUTE PAIN OF RIGHT SHOULDER: Primary | ICD-10-CM

## 2021-05-12 PROCEDURE — 97112 NEUROMUSCULAR REEDUCATION: CPT | Performed by: PHYSICAL THERAPIST

## 2021-05-12 PROCEDURE — 97110 THERAPEUTIC EXERCISES: CPT | Performed by: PHYSICAL THERAPIST

## 2021-05-12 NOTE — PROGRESS NOTES
Daily Note     Today's date: 2021  Patient name: Erika Brady  : 1950  MRN: [id number]  Referring provider: Dunia Suh DO  Dx:   Encounter Diagnosis     ICD-10-CM    1  Acute pain of right shoulder  M25 511        Start Time: 1100  Stop Time: 1136  Total time in clinic (min): 36 minutes    Subjective: pt reports that his shoulder is doing much better since IE  He notes improved ability to complete work around the house  He continues to have "cunching" in his shoulder, but he feels this will most likely never get better  Objective: See treatment diary below      Assessment: Tolerated treatment well  Pt completed all exercises with minor increases in R shoulder discomfort  This was kept in a tolerable range  Discussion will be had with patient about DC planning NV  Continue to progress as tolerated  Patient would benefit from continued PT      Plan: Continue per plan of care        Precautions: DM, Moldy-hay disease, pleural effusion, CHF, HX CVA, cardiomyopathy, Hx LBP, HX b/l RTC repair; COLD SENSITIVITY      Manuals 3/31 4/7 4/9 4/16 4/21 4/28 5/5 5/7 5/12    R shoulder PROM RK RK RK AK ML CC RK + STM RK RK                                           Neuro Re-Ed             scap retract   5" x10 5" 15x 5"x15 5" x15       isometrics  5"x10 flexion p! 5"x10 flex and ext 5" 10x flex and ext 5" x 10 ea  Flex/Ext/ER 5" x 10 ea  Flex/Ext/ER 5" x 10 ea  Flex/Ext/ER 5" x 10 ea  Flex/Ext/ER 5" x 10 ea  Flex/Ext/ER    TB rows/ext       rtb x20 rows/ x10 ext btb x20 rows  btb x20 rows     TB ER/IR       rtb 2x10 , gtb 2x10 btb IR 2x10;  btb  2x10    TB horz abd        5" x20 5" x15                              Ther Ex             Table slides   x10 flex and abd 5" 10x flx/ abd 5" x 10   Flex/Abd 5" x 10   Flex/Abd       pendulums  x20 circles p! x20 cirles x20 circles circles 20x circles 20x       pulleys  5' 5' 5' 5' 5' 5'  5'    Supine cane flexion  np p!     supine AROM x10 supine AROM 2x10 reclined AROM 2x10    Supine cane ER             Wall slides   x10 x10 10x 10x x10 x20 x20    scaption against wall        #1 2x10 #1 2x10    TOY rows        #5 KB 2x10 #8 KB 2x10                                           Ther Activity                                       Gait Training                                       Modalities

## 2021-05-14 ENCOUNTER — OFFICE VISIT (OUTPATIENT)
Dept: PHYSICAL THERAPY | Facility: MEDICAL CENTER | Age: 71
End: 2021-05-14
Payer: MEDICARE

## 2021-05-14 DIAGNOSIS — M25.511 ACUTE PAIN OF RIGHT SHOULDER: Primary | ICD-10-CM

## 2021-05-14 PROCEDURE — 97140 MANUAL THERAPY 1/> REGIONS: CPT | Performed by: PHYSICAL THERAPIST

## 2021-05-14 PROCEDURE — 97110 THERAPEUTIC EXERCISES: CPT | Performed by: PHYSICAL THERAPIST

## 2021-05-14 NOTE — PROGRESS NOTES
Daily Note     Today's date: 2021  Patient name: Enrique Lindsay  : 1950  MRN: [id number]  Referring provider: Tamiko Pierre DO  Dx:   Encounter Diagnosis     ICD-10-CM    1  Acute pain of right shoulder  M25 511        Start Time: 1109  Stop Time: 1145  Total time in clinic (min): 36 minutes    Subjective: pt reports continued shoulder discomfort  Objective: See treatment diary below      Assessment: Tolerated treatment well  Pt completed all exercises with self progression and good tolerance  Pt reports "theres always pain" but reports that this is tolerable  Pt is to f/u with MD on   I believe this patient will continue to benefit from PT guided strengthening program to improve functional imitations  Patient would benefit from continued PT      Plan: Continue per plan of care        Precautions: DM, Moldy-hay disease, pleural effusion, CHF, HX CVA, cardiomyopathy, Hx LBP, HX b/l RTC repair; COLD SENSITIVITY      Manuals     R shoulder PROM RK   AK ML CC RK + STM RK RK                                           Neuro Re-Ed             scap retract    5" 15x 5"x15 5" x15       isometrics 5" x 10 ea  Flex/Ext/ER   5" 10x flex and ext 5" x 10 ea  Flex/Ext/ER 5" x 10 ea  Flex/Ext/ER 5" x 10 ea  Flex/Ext/ER 5" x 10 ea  Flex/Ext/ER 5" x 10 ea  Flex/Ext/ER    TB rows/ext btb 2x10 rows      rtb x20 rows/ x10 ext btb x20 rows  btb x20 rows     TB ER/IR btb x20 ea       rtb 2x10 , gtb 2x10 btb IR 2x10;  btb  2x10    TB horz abd btb x20       5" x20 5" x15                              Ther Ex             Table slides    5" 10x flx/ abd 5" x 10   Flex/Abd 5" x 10   Flex/Abd       pendulums    x20 circles circles 20x circles 20x       pulleys 5'   5' 5' 5' 5'  5'    Supine cane flexion reclined #1 2x10      supine AROM x10 supine AROM 2x10 reclined AROM 2x10    Supine cane ER             Wall slides x20   x10 10x 10x x10 x20 x20    scaption against wall 2x10 #1 #1 2x10 #1 2x10    TOY rows #10 DB 2x10       #5 KB 2x10 #8 KB 2x10                                           Ther Activity                                       Gait Training                                       Modalities

## 2021-05-19 ENCOUNTER — OFFICE VISIT (OUTPATIENT)
Dept: OBGYN CLINIC | Facility: MEDICAL CENTER | Age: 71
End: 2021-05-19
Payer: MEDICARE

## 2021-05-19 VITALS
HEIGHT: 70 IN | DIASTOLIC BLOOD PRESSURE: 82 MMHG | HEART RATE: 64 BPM | SYSTOLIC BLOOD PRESSURE: 128 MMHG | BODY MASS INDEX: 31.5 KG/M2 | WEIGHT: 220 LBS

## 2021-05-19 DIAGNOSIS — M25.511 ACUTE PAIN OF RIGHT SHOULDER: Primary | ICD-10-CM

## 2021-05-19 PROCEDURE — 99213 OFFICE O/P EST LOW 20 MIN: CPT | Performed by: PHYSICAL MEDICINE & REHABILITATION

## 2021-05-19 NOTE — PROGRESS NOTES
1  Acute pain of right shoulder       No orders of the defined types were placed in this encounter  Impression:  Patient is here in follow up of right shoulder pain likely secondary to rotator cuff impingement versus bicipital tendinopathy with DOI as 3/17/2021  Patient has done remarkably well with physical therapy and is optimistic  He will continue with physical therapy  I will see him back in four weeks to reassess  If still having symptoms, can consider obtaining an MRI or an ultrasound-guided injection of his biceps tendon  Imaging Studies (I personally reviewed images in PACS and report):  Right shoulder x-rays most recent to this encounter reviewed   These images show moderate osteoarthritis of the acromioclavicular joint and the glenohumeral joint   There is decreased acromial-humeral interval which could represent a complete rotator cuff tear  No follow-ups on file  HPI:  Elsi Mcallister is a 79 y o  male  who presents in follow up  Here for   Chief Complaint   Patient presents with    Right Shoulder - Follow-up       Date of injury: Chronic pain that worsened on 3/17/2021 after he slipped on a dog bone and had external rotation stress to his shoulder  Trajectory of symptoms:  Feeling a lot better  Review of Systems   Constitutional: Positive for activity change  Negative for fever  HENT: Negative for sore throat  Eyes: Negative for visual disturbance  Respiratory: Negative for shortness of breath  Cardiovascular: Negative for chest pain  Gastrointestinal: Negative for abdominal pain  Endocrine: Negative for polydipsia  Genitourinary: Negative for difficulty urinating  Musculoskeletal: Positive for arthralgias  Skin: Negative for rash  Allergic/Immunologic: Negative for immunocompromised state  Neurological: Negative for numbness  Hematological: Does not bruise/bleed easily  Psychiatric/Behavioral: Negative for confusion         Following history reviewed and updated:  Past Medical History:   Diagnosis Date    Atypical chest pain     Back pain     CHF (congestive heart failure) (HCC)     Legionnaire's disease ([address] )     Lung disorder     Migraine      Past Surgical History:   Procedure Laterality Date    ROTATOR CUFF REPAIR Bilateral     TONSILLECTOMY      TRIGGER FINGER RELEASE       Social History   Social History     Substance and Sexual Activity   Alcohol Use Yes    Frequency: Monthly or less     Social History     Substance and Sexual Activity   Drug Use Not Currently    Comment: CBD Oil pt stated no longer using     Social History     Tobacco Use   Smoking Status Former Smoker    Packs/day:  00    Years: 25 00    Pack years: 25 00    Types: Cigarettes, Cigars    Quit date:     Years since quittin 3   Smokeless Tobacco Never Used     History reviewed  No pertinent family history  Allergies   Allergen Reactions    Poultry Meal - Food Allergy         Constitutional:  /82 (BP Location: Right arm, Patient Position: Sitting, Cuff Size: Standard)   Pulse 64   Ht 5' 10" (1 778 m)   Wt 99 8 kg (220 lb)   BMI 31 57 kg/m²    General: NAD  Eyes: Clear sclerae  ENT: No inflammation, lesion, or mass of lips  No tracheal deviation  Musculoskeletal: As mentioned below  Integumentary: No visible rashes or skin lesions  Pulmonary/Chest: Effort normal  No respiratory distress  Neuro: CN's grossly intact, BROWN  Psych: Normal affect and judgement  Vascular: WWP  Right Shoulder Exam     Tenderness   The patient is experiencing tenderness in the biceps tendon  Range of Motion   The patient has normal right shoulder ROM      Tests   Aldrich test: positive  Cross arm: negative  Impingement: negative  Drop arm: negative    Other   Erythema: absent  Scars: absent  Sensation: normal  Pulse: present    Comments:  Positive speed's test              Procedures

## 2021-05-26 ENCOUNTER — APPOINTMENT (OUTPATIENT)
Dept: PHYSICAL THERAPY | Facility: MEDICAL CENTER | Age: 71
End: 2021-05-26
Payer: MEDICARE

## 2021-05-28 ENCOUNTER — OFFICE VISIT (OUTPATIENT)
Dept: PHYSICAL THERAPY | Facility: MEDICAL CENTER | Age: 71
End: 2021-05-28
Payer: MEDICARE

## 2021-05-28 DIAGNOSIS — M25.511 ACUTE PAIN OF RIGHT SHOULDER: Primary | ICD-10-CM

## 2021-05-28 PROCEDURE — 97110 THERAPEUTIC EXERCISES: CPT | Performed by: PHYSICAL THERAPIST

## 2021-05-28 PROCEDURE — 97140 MANUAL THERAPY 1/> REGIONS: CPT | Performed by: PHYSICAL THERAPIST

## 2021-05-28 NOTE — PROGRESS NOTES
Daily Note     Today's date: 2021  Patient name: Gloria Huber  : 1950  MRN: [id number]  Referring provider: Carmita Ramos DO  Dx:   Encounter Diagnosis     ICD-10-CM    1  Acute pain of right shoulder  M25 511        Start Time: 1147  Stop Time: 1217  Total time in clinic (min): 30 minutes    Subjective: pt reports to therapy stating that his R shoulder is in increased pain secondary to picking up a garbe bag of about 40lbs  He reports that he lost all strength in arm and has completely shifted to using L arm for all activities  Objective: See treatment diary below      Assessment: Tolerated treatment fair  Reduced treatment plan was completed to reduce symptoms  This was tolerated well  Pt was educated on exercises and modalities to use at home to reduce symptoms  We will continue to progress as tolerated  Patient would benefit from continued PT      Plan: Continue per plan of care        Precautions: DM, Moldy-hay disease, pleural effusion, CHF, HX CVA, cardiomyopathy, Hx LBP, HX b/l RTC repair; COLD SENSITIVITY      Manuals     R shoulder PROM RK RK  AK ML CC RK + STM RK RK                                           Neuro Re-Ed             scap retract    5" 15x 5"x15 5" x15       isometrics 5" x 10 ea  Flex/Ext/ER   5" 10x flex and ext 5" x 10 ea  Flex/Ext/ER 5" x 10 ea  Flex/Ext/ER 5" x 10 ea  Flex/Ext/ER 5" x 10 ea  Flex/Ext/ER 5" x 10 ea  Flex/Ext/ER    TB rows/ext btb 2x10 rows      rtb x20 rows/ x10 ext btb x20 rows  btb x20 rows     TB ER/IR btb x20 ea       rtb 2x10 , gtb 2x10 btb IR 2x10;  btb  2x10    TB horz abd btb x20       5" x20 5" x15                              Ther Ex             Table slides  2x10 ea   5" 10x flx/ abd 5" x 10   Flex/Abd 5" x 10   Flex/Abd       pendulums  x20 circles  x20 circles circles 20x circles 20x       pulleys 5' 5'  5' 5' 5' 5'  5'    Supine cane flexion reclined #1 2x10 np P!     supine AROM x10 supine AROM 2x10 reclined AROM 2x10    Supine cane ER  x20           Wall slides x20 x20 L UE to assist  x10 10x 10x x10 x20 x20    scaption against wall 2x10 #1       #1 2x10 #1 2x10    TOY rows #10 DB 2x10       #5 KB 2x10 #8 KB 2x10                                           Ther Activity                                       Gait Training                                       Modalities

## 2021-06-02 ENCOUNTER — OFFICE VISIT (OUTPATIENT)
Dept: PHYSICAL THERAPY | Facility: MEDICAL CENTER | Age: 71
End: 2021-06-02
Payer: MEDICARE

## 2021-06-02 DIAGNOSIS — M25.511 ACUTE PAIN OF RIGHT SHOULDER: Primary | ICD-10-CM

## 2021-06-02 PROCEDURE — 97140 MANUAL THERAPY 1/> REGIONS: CPT | Performed by: PHYSICAL THERAPIST

## 2021-06-02 PROCEDURE — 97110 THERAPEUTIC EXERCISES: CPT | Performed by: PHYSICAL THERAPIST

## 2021-06-02 NOTE — PROGRESS NOTES
Daily Note     Today's date: 2021  Patient name: Venice Ray  : 1950  MRN: [id number]  Referring provider: Kayode Alexander DO  Dx:   Encounter Diagnosis     ICD-10-CM    1  Acute pain of right shoulder  M25 511        Start Time: 1030  Stop Time: 1100  Total time in clinic (min): 30 minutes    Subjective: pt reports improvement in pain in the shoulder  Pt has been using home TENS unit with good tolerance  Objective: See treatment diary below      Assessment: Tolerated treatment fair  Pt completed all exercises with improved performance but continuation of symptoms  Pt was educated about TENS safety, pad placement leaving at least 1" of space between pads and use of smaller pads for the shoulder to reduce risk of injury  Pt gave verbal understanding  Plan to RE NV  Continue to progress as tolerated  Patient would benefit from continued PT      Plan: Continue per plan of care  Precautions: DM, Moldy-hay disease, pleural effusion, CHF, HX CVA, cardiomyopathy, Hx LBP, HX b/l RTC repair; COLD SENSITIVITY      Manuals     R shoulder PROM RK RK RK   CC RK + STM RK RK                                           Neuro Re-Ed             scap retract      5" x15       isometrics 5" x 10 ea  Flex/Ext/ER     5" x 10 ea  Flex/Ext/ER 5" x 10 ea  Flex/Ext/ER 5" x 10 ea  Flex/Ext/ER 5" x 10 ea  Flex/Ext/ER    TB rows/ext btb 2x10 rows      rtb x20 rows/ x10 ext btb x20 rows  btb x20 rows     TB ER/IR btb x20 ea       rtb 2x10 , gtb 2x10 btb IR 2x10;  btb  2x10    TB horz abd btb x20       5" x20 5" x15                              Ther Ex             Table slides  2x10 ea  2x10 ea     5" x 10   Flex/Abd       pendulums  x20 circles x20 circles   circles 20x       pulleys 5' 5' 5'   5' 5'  5'    Supine cane flexion reclined #1 2x10 np P! reclined x10    supine AROM x10 supine AROM 2x10 reclined AROM 2x10    Supine cane ER  x20 x20          Wall slides x20 x20 L UE to assist x20 single arm   10x x10 x20 x20    scaption against wall 2x10 #1       #1 2x10 #1 2x10    TOY rows #10 DB 2x10       #5 KB 2x10 #8 KB 2x10                                           Ther Activity                                       Gait Training                                       Modalities

## 2021-06-04 ENCOUNTER — EVALUATION (OUTPATIENT)
Dept: PHYSICAL THERAPY | Facility: MEDICAL CENTER | Age: 71
End: 2021-06-04
Payer: MEDICARE

## 2021-06-04 DIAGNOSIS — M25.511 ACUTE PAIN OF RIGHT SHOULDER: Primary | ICD-10-CM

## 2021-06-04 PROCEDURE — 97140 MANUAL THERAPY 1/> REGIONS: CPT | Performed by: PHYSICAL THERAPIST

## 2021-06-04 PROCEDURE — 97110 THERAPEUTIC EXERCISES: CPT | Performed by: PHYSICAL THERAPIST

## 2021-06-04 NOTE — PROGRESS NOTES
PT Re-Evaluation     Today's date: 2021  Patient name: Sav Reyes  : 1950  MRN: [id number]  Referring provider: Rose Ortega DO  Dx:   Encounter Diagnosis     ICD-10-CM    1  Acute pain of right shoulder  M25 511        Start Time: 1140  Stop Time: 1214  Total time in clinic (min): 34 minutes    Assessment  Assessment details: Pt is a 79 y o male who has completed 13 PT sessions to this date  The patient has made improvements in R shoulder AROM and PROM  However, the patient continues to have increased difficulty with continued increased pain levels and a decrease in R shoulder strength secondary to recent lifting injury  Pt is steadily improving since reinjury and I believe that he will continue to benefit form skilled PT for continued manual therapy to the R shoulder, continued R shoulder ROM exercises, continued R shoulder strengthening exercises and mechanics training to improve symptoms and assist the patient to return to PLOF    Impairments: abnormal or restricted ROM, abnormal movement, activity intolerance, impaired physical strength, lacks appropriate home exercise program and poor posture     Symptom irritability: moderateUnderstanding of Dx/Px/POC: good   Prognosis: good    Goals  STGs: 4 weeks  1) Pt will have SPR decrease of 2 units at rest- met  2) pt will have improved R shoulder flexion AROM to 90*+- met  3) pt will have improved foto score of 10 points- not met    LTGs: 8 weeks  1) pt will be independent with HEP by D/C- part met  2) pt will be independent with symptom management by D/C- part met  3) pt will report improved tolerance to reaching above shoulder height with no more than 2/10 pain in the shoulder in order to have improved functional ability by DC - part met    Plan  Patient would benefit from: skilled physical therapy  Planned modality interventions: cryotherapy and thermotherapy: hydrocollator packs  Planned therapy interventions: joint mobilization, manual therapy, neuromuscular re-education, postural training, strengthening, stretching, therapeutic activities, therapeutic exercise, home exercise program and functional ROM exercises  Frequency: 2x week  Duration in weeks: 8  Plan of Care beginning date: 6/4/2021  Plan of Care expiration date: 7/30/2021  Treatment plan discussed with: patient        Subjective Evaluation    History of Present Illness  Mechanism of injury: Subjective Comments: about 2-3 weeks pt attempted to lift up a garbage bag of increased weight causing increased shoulder pain  Pt reports since then, the shoulder is improving  Pain is decreasing, but he notes decreased strength in his shoulder  Denies N&T  Pain   Rest: 5-6/10   Best: 5-6/10   Worst: 8-9/10            Objective     Static Posture     Head  Forward  Shoulders  Rounded  Palpation     Right   Tenderness of the anterior deltoid, biceps, middle deltoid, supraspinatus and upper trapezius  Tenderness     Right Shoulder  Tenderness in the supraspinatus tendon       Active Range of Motion   Left Shoulder   Flexion: 155 degrees   Abduction: 102 degrees   External rotation BTH: T8   Internal rotation BTB: T12     Right Shoulder   Flexion: 165 degrees with pain  Abduction: 165 degrees with pain  External rotation BTH: T5 with pain  Internal rotation BTB: T12 with pain    Passive Range of Motion     Right Shoulder   Flexion: 165 degrees with pain  Abduction: 165 degrees with pain  External rotation 45°: 60 degrees   Internal rotation 45°: 65 degrees     Strength/Myotome Testing     Right Shoulder     Planes of Motion   Flexion: 3   Abduction: 3+   External rotation at 0°: 3   Internal rotation at 0°: 4              Precautions: DM, Moldy-hay disease, pleural effusion, CHF, HX CVA, cardiomyopathy, Hx LBP, HX b/l RTC repair; COLD SENSITIVITY      Manuals 5/14 5/28 6/2 6/4 4/28 5/5 5/7 5/12    R shoulder PROM RK RK RK RK  CC RK + STM RK RK Neuro Re-Ed             scap retract      5" x15       isometrics 5" x 10 ea  Flex/Ext/ER   5" x10 flex/ext/abd  5" x 10 ea  Flex/Ext/ER 5" x 10 ea  Flex/Ext/ER 5" x 10 ea  Flex/Ext/ER 5" x 10 ea  Flex/Ext/ER    TB rows/ext btb 2x10 rows      rtb x20 rows/ x10 ext btb x20 rows  btb x20 rows     TB ER/IR btb x20 ea       rtb 2x10 , gtb 2x10 btb IR 2x10;  btb  2x10    TB horz abd btb x20       5" x20 5" x15                              Ther Ex             Table slides  2x10 ea  2x10 ea  2x10 ea    5" x 10   Flex/Abd       pendulums  x20 circles x20 circles x20 circles  circles 20x       pulleys 5' 5' 5' 5'  5' 5'  5'    Supine cane flexion reclined #1 2x10 np P! reclined x10 reclined x20   supine AROM x10 supine AROM 2x10 reclined AROM 2x10    Supine cane ER  x20 x20          Wall slides x20 x20 L UE to assist x20 single arm x20 single arm  10x x10 x20 x20    scaption against wall 2x10 #1       #1 2x10 #1 2x10    TOY rows #10 DB 2x10       #5 KB 2x10 #8 KB 2x10                                           Ther Activity                                       Gait Training                                       Modalities

## 2021-06-09 ENCOUNTER — OFFICE VISIT (OUTPATIENT)
Dept: PHYSICAL THERAPY | Facility: MEDICAL CENTER | Age: 71
End: 2021-06-09
Payer: MEDICARE

## 2021-06-09 DIAGNOSIS — M25.511 ACUTE PAIN OF RIGHT SHOULDER: Primary | ICD-10-CM

## 2021-06-09 PROCEDURE — 97112 NEUROMUSCULAR REEDUCATION: CPT | Performed by: PHYSICAL THERAPIST

## 2021-06-09 PROCEDURE — 97110 THERAPEUTIC EXERCISES: CPT | Performed by: PHYSICAL THERAPIST

## 2021-06-09 NOTE — PROGRESS NOTES
Daily Note     Today's date: 2021  Patient name: Wiley Yañez  : 1950  MRN: [id number]  Referring provider: Natalie Karimi DO  Dx:   Encounter Diagnosis     ICD-10-CM    1  Acute pain of right shoulder  M25 511        Start Time: 1140  Stop Time: 1212  Total time in clinic (min): 32 minutes    Subjective: pt reports increased soreness and aching throughout the body  He reports that his shoulder is improving  Objective: See treatment diary below      Assessment: Tolerated treatment well  Pt completed all exercises with minimal increases in discomfort with exercise  This was well managed with rest breaks  Pt educated to attempt TB exercises at home if symptoms are well managed  Continue to progress as tolerated  Patient would benefit from continued PT      Plan: Continue per plan of care  Precautions: DM, Moldy-hay disease, pleural effusion, CHF, HX CVA, cardiomyopathy, Hx LBP, HX b/l RTC repair; COLD SENSITIVITY      Manuals     R shoulder PROM RK RK RK RK RK  RK + STM RK RK                                           Neuro Re-Ed             scap retract             isometrics 5" x 10 ea  Flex/Ext/ER   5" x10 flex/ext/abd 5" x10 flex/ext/abd  5" x 10 ea  Flex/Ext/ER 5" x 10 ea  Flex/Ext/ER 5" x 10 ea  Flex/Ext/ER    TB rows/ext btb 2x10 rows    btb 2x10   rtb x20 rows/ x10 ext btb x20 rows  btb x20 rows     TB ER/IR btb x20 ea     rtb 2x10  rtb 2x10 , gtb 2x10 btb IR 2x10;  btb  2x10    TB horz abd btb x20       5" x20 5" x15                              Ther Ex             Table slides  2x10 ea  2x10 ea  2x10 ea           pendulums  x20 circles x20 circles x20 circles x20 circles        pulleys 5' 5' 5' 5' 5'  5'  5'    Supine cane flexion reclined #1 2x10 np P! reclined x10 reclined x20 reclined x20, x10 single arm  supine AROM x10 supine AROM 2x10 reclined AROM 2x10    Supine cane ER  x20 x20          Wall slides x20 x20 L UE to assist x20 single arm x20 single arm x20 single arm  x10 x20 x20    scaption against wall 2x10 #1       #1 2x10 #1 2x10    TOY rows #10 DB 2x10       #5 KB 2x10 #8 KB 2x10                                           Ther Activity                                       Gait Training                                       Modalities

## 2021-06-11 ENCOUNTER — OFFICE VISIT (OUTPATIENT)
Dept: PHYSICAL THERAPY | Facility: MEDICAL CENTER | Age: 71
End: 2021-06-11
Payer: MEDICARE

## 2021-06-11 DIAGNOSIS — M25.511 ACUTE PAIN OF RIGHT SHOULDER: Primary | ICD-10-CM

## 2021-06-11 PROCEDURE — 97110 THERAPEUTIC EXERCISES: CPT | Performed by: PHYSICAL THERAPIST

## 2021-06-11 PROCEDURE — 97140 MANUAL THERAPY 1/> REGIONS: CPT | Performed by: PHYSICAL THERAPIST

## 2021-06-11 NOTE — PROGRESS NOTES
Daily Note     Today's date: 2021  Patient name: Alfonso Samaniego  : 1950  MRN: [id number]  Referring provider: Hipolito Wilson DO  Dx:   Encounter Diagnosis     ICD-10-CM    1  Acute pain of right shoulder  M25 511                   Subjective: Pt reported to therapy with no change in shoulder symptoms  Objective: See treatment diary below      Assessment: Tolerated treatment well  Pt completed all exercises well with mild complaints of low back pain  Pt reported that the symptoms were tolerable  Pt was encouraged to only complete exercises in a pain tolerable range  Pt reports that he will be and has been completing heavy lifting activities at home such as moving washer and dryer out of basement  Pt encouraged to reduce this activity as much as possible to allow shoulder to recover  Pt f/u with MD next week  We will continue to monitor and progress the patient as tolerated  Patient would benefit from continued PT      Plan: Continue per plan of care  Precautions: DM, Moldy-hay disease, pleural effusion, CHF, HX CVA, cardiomyopathy, Hx LBP, HX b/l RTC repair; COLD SENSITIVITY    Exercises were instructed by Waqas Byrd SPT under the direct supervision of Meg Aguillon PT, DPT for entirety of session  Manuals     R shoulder PROM RK RK RK RK RK RK   RK                                           Neuro Re-Ed             scap retract             isometrics 5" x 10 ea  Flex/Ext/ER   5" x10 flex/ext/abd 5" x10 flex/ext/abd 5" x10 flex/ext/abd   5" x 10 ea  Flex/Ext/ER    TB rows/ext btb 2x10 rows    btb 2x10  btb 2x10   btb x20 rows     TB ER/IR btb x20 ea     rtb 2x10 btb 2x10   btb  2x10    TB horz abd btb x20        5" x15                              Ther Ex             Table slides  2x10 ea  2x10 ea  2x10 ea  2x10 ea         pendulums  x20 circles x20 circles x20 circles x20 circles x20 circles       pulleys 5' 5' 5' 5' 5' 5'   5'    Supine cane flexion reclined #1 2x10 np P! reclined x10 reclined x20 reclined x20, x10 single arm reclined x20 w/ cane   reclined AROM 2x10    Supine cane ER  x20 x20          Wall slides x20 x20 L UE to assist x20 single arm x20 single arm x20 single arm x20 single arm   x20    scaption against wall 2x10 #1        #1 2x10    TOY rows #10 DB 2x10        #8 KB 2x10                                           Ther Activity                                       Gait Training                                       Modalities

## 2021-06-15 ENCOUNTER — OFFICE VISIT (OUTPATIENT)
Dept: PHYSICAL THERAPY | Facility: MEDICAL CENTER | Age: 71
End: 2021-06-15
Payer: MEDICARE

## 2021-06-15 DIAGNOSIS — M25.511 ACUTE PAIN OF RIGHT SHOULDER: Primary | ICD-10-CM

## 2021-06-15 PROCEDURE — 97140 MANUAL THERAPY 1/> REGIONS: CPT | Performed by: PHYSICAL THERAPIST

## 2021-06-15 PROCEDURE — 97110 THERAPEUTIC EXERCISES: CPT | Performed by: PHYSICAL THERAPIST

## 2021-06-15 NOTE — PROGRESS NOTES
Daily Note     Today's date: 6/15/2021  Patient name: Cornelius Zarate  : 1950  MRN: [id number]  Referring provider: Kellie Salas DO  Dx:   Encounter Diagnosis     ICD-10-CM    1  Acute pain of right shoulder  M25 511        Start Time: 0800  Stop Time: 08  Total time in clinic (min): 32 minutes    Subjective: pt reported that he feels his shoulder is getting better  He reports that his AROM is better, but he continues to have decrease strength  Objective: See treatment diary below      Assessment: Tolerated treatment well  Pt tolerated all exercises well with increased fatigue during strengthening exercises  Pt reports that he f/u with MD on Wednesday  We will hold on PT pending MD f/u  Patient would benefit from continued PT      Plan: Continue per plan of care  Precautions: DM, Moldy-hay disease, pleural effusion, CHF, HX CVA, cardiomyopathy, Hx LBP, HX b/l RTC repair; COLD SENSITIVITY        Manuals 5/14 5/28 6/2 6/4 6/9 6/11 6/15  5/12    R shoulder PROM RK RK RK RK RK RK RK  RK                                           Neuro Re-Ed             scap retract             isometrics 5" x 10 ea  Flex/Ext/ER   5" x10 flex/ext/abd 5" x10 flex/ext/abd 5" x10 flex/ext/abd 5" x10 flex/ext/abd  5" x 10 ea  Flex/Ext/ER    TB rows/ext btb 2x10 rows    btb 2x10  btb 2x10 btb 2x10  btb x20 rows     TB ER/IR btb x20 ea     rtb 2x10 btb 2x10 btb 2x10  btb  2x10    TB horz abd btb x20        5" x15                              Ther Ex             Table slides  2x10 ea  2x10 ea  2x10 ea  2x10 ea         pendulums  x20 circles x20 circles x20 circles x20 circles x20 circles       pulleys 5' 5' 5' 5' 5' 5' 5'  5'    Supine cane flexion reclined #1 2x10 np P! reclined x10 reclined x20 reclined x20, x10 single arm reclined x20 w/ cane reclined x20 single arm  reclined AROM 2x10    Supine cane ER  x20 x20          Wall slides x20 x20 L UE to assist x20 single arm x20 single arm x20 single arm x20 single arm x20 single arm  x20    scaption against wall 2x10 #1        #1 2x10    TOY rows #10 DB 2x10        #8 KB 2x10                                           Ther Activity                                       Gait Training                                       Modalities

## 2021-06-16 ENCOUNTER — OFFICE VISIT (OUTPATIENT)
Dept: OBGYN CLINIC | Facility: MEDICAL CENTER | Age: 71
End: 2021-06-16
Payer: MEDICARE

## 2021-06-16 ENCOUNTER — APPOINTMENT (OUTPATIENT)
Dept: PHYSICAL THERAPY | Facility: MEDICAL CENTER | Age: 71
End: 2021-06-16
Payer: MEDICARE

## 2021-06-16 VITALS
RESPIRATION RATE: 17 BRPM | WEIGHT: 220 LBS | BODY MASS INDEX: 31.5 KG/M2 | SYSTOLIC BLOOD PRESSURE: 122 MMHG | DIASTOLIC BLOOD PRESSURE: 85 MMHG | HEART RATE: 84 BPM | HEIGHT: 70 IN

## 2021-06-16 DIAGNOSIS — M17.11 PRIMARY OSTEOARTHRITIS OF RIGHT KNEE: ICD-10-CM

## 2021-06-16 DIAGNOSIS — M25.511 ACUTE PAIN OF RIGHT SHOULDER: Primary | ICD-10-CM

## 2021-06-16 PROCEDURE — 20610 DRAIN/INJ JOINT/BURSA W/O US: CPT | Performed by: PHYSICAL MEDICINE & REHABILITATION

## 2021-06-16 PROCEDURE — 99213 OFFICE O/P EST LOW 20 MIN: CPT | Performed by: PHYSICAL MEDICINE & REHABILITATION

## 2021-06-16 RX ORDER — TRIAMCINOLONE ACETONIDE 40 MG/ML
40 INJECTION, SUSPENSION INTRA-ARTICULAR; INTRAMUSCULAR
Status: COMPLETED | OUTPATIENT
Start: 2021-06-16 | End: 2021-06-16

## 2021-06-16 RX ORDER — LIDOCAINE HYDROCHLORIDE 10 MG/ML
3 INJECTION, SOLUTION INFILTRATION; PERINEURAL
Status: COMPLETED | OUTPATIENT
Start: 2021-06-16 | End: 2021-06-16

## 2021-06-16 RX ADMIN — LIDOCAINE HYDROCHLORIDE 3 ML: 10 INJECTION, SOLUTION INFILTRATION; PERINEURAL at 09:55

## 2021-06-16 RX ADMIN — TRIAMCINOLONE ACETONIDE 40 MG: 40 INJECTION, SUSPENSION INTRA-ARTICULAR; INTRAMUSCULAR at 09:55

## 2021-06-16 NOTE — PATIENT INSTRUCTIONS
You had a cortisone (steroid) injection  There are two medicines in this injection, a numbing medicine and a steroid  The numbing medication component usually takes effect within a few minutes and wears off after a few hours, after which your pain may return  The steroid medication typically takes effect in 3-5 days, although some people have benefits sooner, and lasts for a much longer time  We will see you back after the MRI to discuss the results and next steps

## 2021-06-16 NOTE — PROGRESS NOTES
1  Acute pain of right shoulder  MRI shoulder right wo contrast   2  Primary osteoarthritis of right knee       Orders Placed This Encounter   Procedures    Large joint arthrocentesis    MRI shoulder right wo contrast        Impression:  Patient is here in follow up of right shoulder pain likely secondary to rotator cuff impingement versus bicipital tendinopathy with DOI as 3/17/2021  Patient has swelling along his anterior shoulder which she did not have previously  He had a setback about three weeks ago  He will stop physical therapy  We will obtain an MRI of his right shoulder  I will see him back afterwards        Patient presents with a new complaint, chronic right knee pain that has been worsening over the past couple of weeks  He denies any history of trauma or fall over the past year  The pain is mostly constant and along the front of the knee  He has some difficulty with extending the knee  He has not had any treatment for this  Patient's right knee pain is likely secondary to osteoarthritis and degenerative medial meniscal tear  We decided to proceed with a steroid injection today  On his follow-up visit, he continues to have symptoms, we will obtain an x-ray of his knee  Imaging Studies (I personally reviewed images in PACS and report):  Right shoulder x-rays most recent to this encounter reviewed   These images show moderate osteoarthritis of the acromioclavicular joint and the glenohumeral joint   There is decreased acromial-humeral interval which could represent a complete rotator cuff tear  Return for Follow-up after right shoulder MRI  HPI:  Jinny Faust is a 70 y o  male  who presents in follow up  Here for   Chief Complaint   Patient presents with    Right Shoulder - Follow-up       Date of injury: Chronic pain that worsened on 3/17/2021 after he slipped on a dog bone and had external rotation stress to his shoulder  Trajectory of symptoms:  See above      Review of Systems   Constitutional: Positive for activity change  Negative for fever  HENT: Negative for sore throat  Eyes: Negative for visual disturbance  Respiratory: Negative for shortness of breath  Cardiovascular: Negative for chest pain  Gastrointestinal: Negative for abdominal pain  Endocrine: Negative for polydipsia  Genitourinary: Negative for difficulty urinating  Musculoskeletal: Positive for arthralgias  Skin: Negative for rash  Allergic/Immunologic: Negative for immunocompromised state  Neurological: Negative for numbness  Hematological: Does not bruise/bleed easily  Psychiatric/Behavioral: Negative for confusion  Following history reviewed and updated:  Past Medical History:   Diagnosis Date    Atypical chest pain     Back pain     [facility]     [facility] [address] [facility]     Lung disorder     Migraine      Past Surgical History:   Procedure Laterality Date    ROTATOR CUFF REPAIR Bilateral     TONSILLECTOMY      TRIGGER FINGER RELEASE       Social History   Social History     Substance and Sexual Activity   Alcohol Use Yes     Social History     Substance and Sexual Activity   Drug Use Not Currently    Comment: CBD Oil pt stated no longer using     Social History     Tobacco Use   Smoking Status Former Smoker    Packs/day: 1 00    Years: 25 00    Pack years: 25 00    Types: Cigarettes, Cigars    Quit date:     Years since quittin 4   Smokeless Tobacco Never Used     History reviewed  No pertinent family history  Allergies   Allergen Reactions    Poultry Meal - Food Allergy         Constitutional:  /85 (BP Location: Right arm, Patient Position: Sitting)   Pulse 84   Resp 17   Ht 5' 10" (1 778 m)   Wt 99 8 kg (220 lb)   BMI 31 57 kg/m²    General: NAD  Eyes: Clear sclerae  ENT: No inflammation, lesion, or mass of lips  No tracheal deviation  Musculoskeletal: As mentioned below    Integumentary: No visible rashes or skin lesions  Pulmonary/Chest: Effort normal  No respiratory distress  Neuro: CN's grossly intact, BROWN  Psych: Normal affect and judgement  Vascular: WWP  Right Knee Exam     Tenderness   The patient is experiencing tenderness in the medial joint line  Range of Motion   Extension: normal   Flexion: abnormal     Other   Erythema: absent  Scars: absent  Sensation: normal  Pulse: present  Swelling: none  Effusion: no effusion present    Comments:  Injection site was CDI  Right Shoulder Exam     Tenderness   The patient is experiencing tenderness in the acromion and biceps tendon  Range of Motion   Active abduction: abnormal   Passive abduction: normal   Forward flexion: abnormal   Internal rotation 0 degrees: abnormal     Tests   Aldrich test: positive  Impingement: positive    Other   Erythema: absent  Scars: absent  Sensation: normal  Pulse: present    Comments:  Positive speed's test   There is swelling over the anterior shoulder  Large joint arthrocentesis: R knee  Universal Protocol:  Consent given by: patient  Timeout called at: 6/16/2021 9:54 AM   Site marked: the operative site was marked  Supporting Documentation  Indications: pain   Procedure Details  Location: knee - R knee  Needle gauge: 21G 2''  Ultrasound guidance: no  Approach: Inferolateral to patella  Medications administered: 40 mg triamcinolone acetonide 40 mg/mL; 3 mL lidocaine 1 %    Patient tolerance: patient tolerated the procedure well with no immediate complications  Dressing:  Sterile dressing applied    A 2 inch needle was used and I was able to hub the entire needle  This makes it fairly certain that I am within the intercondylar notch/joint space  There was little to no resistance encountered during the injection  Prior to the procedure, the patient was informed of the following risks in layman terms:    - Risk of bleeding since a needle is involved    - Risk of infection (1/10,000 chance as per recent studies)  Signs/symptoms were discussed and they would prompt an urgent evaluation at an emergency department   - Risk of pigmentation or skin dimpling in the skin (2-3% chance as per recent studies) from the steroid  - Risk of increased pain from steroid flare (1% chance as per recent studies) that typically lasts 24-48 hours  - Risk of increased blood sugars from the steroid medication that can last for a few weeks  If the patient is a diabetic or pre-diabetic, they were encouraged to closely monitor their blood sugars and discuss with PCP if elevated more than usual or if having symptoms  After going over these risks, we decided that the benefits outweigh the risks and proceeded with the procedure

## 2021-07-12 ENCOUNTER — HOSPITAL ENCOUNTER (OUTPATIENT)
Dept: RADIOLOGY | Age: 71
Discharge: HOME | End: 2021-07-12
Payer: MEDICARE

## 2021-07-12 DIAGNOSIS — M25.511 ACUTE PAIN OF RIGHT SHOULDER: ICD-10-CM

## 2021-07-12 PROCEDURE — 73221 MRI JOINT UPR EXTREM W/O DYE: CPT

## 2021-07-29 ENCOUNTER — OFFICE VISIT (OUTPATIENT)
Dept: OBGYN CLINIC | Facility: MEDICAL CENTER | Age: 71
End: 2021-07-29
Payer: MEDICARE

## 2021-07-29 VITALS
SYSTOLIC BLOOD PRESSURE: 106 MMHG | HEIGHT: 70 IN | DIASTOLIC BLOOD PRESSURE: 70 MMHG | BODY MASS INDEX: 31.5 KG/M2 | WEIGHT: 220 LBS | HEART RATE: 45 BPM

## 2021-07-29 DIAGNOSIS — M67.921 BICEPS TENDINOPATHY OF RIGHT UPPER EXTREMITY: ICD-10-CM

## 2021-07-29 DIAGNOSIS — M75.121 NONTRAUMATIC COMPLETE TEAR OF RIGHT ROTATOR CUFF: Primary | ICD-10-CM

## 2021-07-29 PROCEDURE — 99213 OFFICE O/P EST LOW 20 MIN: CPT | Performed by: PHYSICAL MEDICINE & REHABILITATION

## 2021-07-29 NOTE — PROGRESS NOTES
1  Nontraumatic complete tear of right rotator cuff     2  Biceps tendinopathy of right upper extremity       No orders of the defined types were placed in this encounter  Impression:  Patient is here in follow up of right shoulder pain secondary to rotator cuff tear and bicipital tendinopathy/tear  Patient was doing well up until his previous visit at which point he had a lot of swelling and worsening of symptoms  We reviewed his MRI today  The patient continues to have severe pain which should be limiting him but he continues to work through it  I will have him see Dr Pillo Mata in consultation  Patient's chronic right knee pain 2/2 to osteoarthritis and degenerative medial meniscal tear is doing well after the steroid injection  I can see him back as needed  Imaging Studies (I personally reviewed images in PACS and report):  Right shoulder x-rays most recent to this encounter reviewed   These images show moderate osteoarthritis of the acromioclavicular joint and the glenohumeral joint   There is decreased acromial-humeral interval which could represent a complete rotator cuff tear  Right shoulder MRI dated 7/12/2021:   Full thickness partial width tear supraspinatus anterior half clinical zone and insertion      Mild tendinosis posterior half supraspinatus and anterior infraspinatus insertion      Moderate tendinosis and partial tearing intra-articular and proximal intra-articular long head biceps tendon      Mild glenohumeral and acromioclavicular osteoarthritis "    No follow-ups on file  HPI:  Maciel Whitley is a 70 y o  male  who presents in follow up  Here for   Chief Complaint   Patient presents with    Right Shoulder - Follow-up       Date of injury: Chronic  Trajectory of symptoms: Continues to have pain in the arm  Patient is accompanied by his wife  Review of Systems   Constitutional: Positive for activity change  Negative for fever  HENT: Negative for sore throat  Eyes: Negative for visual disturbance  Respiratory: Negative for shortness of breath  Cardiovascular: Negative for chest pain  Gastrointestinal: Negative for abdominal pain  Endocrine: Negative for polydipsia  Genitourinary: Negative for difficulty urinating  Musculoskeletal: Positive for arthralgias  Skin: Negative for rash  Allergic/Immunologic: Negative for immunocompromised state  Neurological: Negative for numbness  Hematological: Does not bruise/bleed easily  Psychiatric/Behavioral: Negative for confusion  Following history reviewed and updated:  Past Medical History:   Diagnosis Date    Atypical chest pain     Back pain     [facility])     [facility]'s [facility] ([facility] [address] [facility]     Lung disorder     Migraine      Past Surgical History:   Procedure Laterality Date    ROTATOR CUFF REPAIR Bilateral     TONSILLECTOMY      TRIGGER FINGER RELEASE       Social History   Social History     Substance and Sexual Activity   Alcohol Use Yes     Social History     Substance and Sexual Activity   Drug Use Not Currently    Comment: CBD Oil pt stated no longer using     Social History     Tobacco Use   Smoking Status Former Smoker    Packs/day: 1 00    Years: 25 00    Pack years: 25 00    Types: Cigarettes, Cigars    Quit date:     Years since quittin 5   Smokeless Tobacco Never Used     No family history on file  Allergies   Allergen Reactions    Poultry Meal - Food Allergy         Constitutional:  /70 (BP Location: Left arm, Patient Position: Sitting, Cuff Size: Standard)   Pulse (!) 45   Ht 5' 10" (1 778 m)   Wt 99 8 kg (220 lb)   BMI 31 57 kg/m²    General: NAD  Eyes: Clear sclerae  ENT: No inflammation, lesion, or mass of lips  No tracheal deviation  Musculoskeletal: As mentioned below  Integumentary: No visible rashes or skin lesions  Pulmonary/Chest: Effort normal  No respiratory distress  Neuro: CN's grossly intact, BROWN    Psych: Normal affect and judgement  Vascular: WWP  Right Shoulder Exam     Tenderness   The patient is experiencing tenderness in the acromion and biceps tendon      Range of Motion   Active abduction:  140 abnormal   Forward flexion:  140 abnormal     Tests   Aldrich test: positive  Impingement: positive  Drop arm: positive    Other   Erythema: absent  Scars: absent  Sensation: normal  Pulse: present             Procedures

## 2021-08-26 ENCOUNTER — OFFICE VISIT (OUTPATIENT)
Dept: URGENT CARE | Facility: MEDICAL CENTER | Age: 71
End: 2021-08-26
Payer: MEDICARE

## 2021-08-26 ENCOUNTER — APPOINTMENT (OUTPATIENT)
Dept: RADIOLOGY | Facility: MEDICAL CENTER | Age: 71
End: 2021-08-26
Payer: MEDICARE

## 2021-08-26 VITALS
OXYGEN SATURATION: 95 % | TEMPERATURE: 97.7 F | HEART RATE: 72 BPM | DIASTOLIC BLOOD PRESSURE: 69 MMHG | SYSTOLIC BLOOD PRESSURE: 133 MMHG | HEIGHT: 69 IN | BODY MASS INDEX: 32.58 KG/M2 | WEIGHT: 220 LBS | RESPIRATION RATE: 18 BRPM

## 2021-08-26 DIAGNOSIS — S80.01XA TRAUMATIC HEMATOMA OF RIGHT KNEE, INITIAL ENCOUNTER: ICD-10-CM

## 2021-08-26 DIAGNOSIS — M25.562 ACUTE PAIN OF LEFT KNEE: Primary | ICD-10-CM

## 2021-08-26 DIAGNOSIS — M25.562 ACUTE PAIN OF LEFT KNEE: ICD-10-CM

## 2021-08-26 PROCEDURE — 73564 X-RAY EXAM KNEE 4 OR MORE: CPT

## 2021-08-26 RX ORDER — OXYCODONE HYDROCHLORIDE 5 MG/1
5 TABLET ORAL EVERY 6 HOURS PRN
Qty: 12 TABLET | Refills: 0 | Status: SHIPPED | OUTPATIENT
Start: 2021-08-26

## 2021-08-26 NOTE — PATIENT INSTRUCTIONS
Oxycodone for moderate to  severe pain  Tylenol as needed for mild to moderate pain  Rest, ice, and elevate  Follow up with orthopedics as scheduled   If pain or swelling worsens or if you develop a fever proceed to the ER    Hematoma   WHAT YOU NEED TO KNOW:   A hematoma is a collection of blood  A bruise is a type of hematoma  A hematoma may form in a muscle or in the tissues just under the skin  A hematoma that forms under the skin will feel like a bump or hard mass  Hematomas can happen anywhere in your body, including in your brain  Your body may break down and absorb a mild hematoma on its own  A more serious hematoma may need treatment  DISCHARGE INSTRUCTIONS:   Medicines: You may need any of the following:  · Prescription pain medicine  may be given  Ask how to take this medicine safely  · NSAIDs , such as ibuprofen, help decrease swelling, pain, and fever  This medicine is available with or without a doctor's order  NSAIDs can cause stomach bleeding or kidney problems in certain people  If you take blood thinner medicine, always ask your healthcare provider if NSAIDs are safe for you  Always read the medicine label and follow directions  · Antibiotics  prevent or treat a bacterial infection  · Take your medicine as directed  Contact your healthcare provider if you think your medicine is not helping or if you have side effects  Tell him of her if you are allergic to any medicine  Keep a list of the medicines, vitamins, and herbs you take  Include the amounts, and when and why you take them  Bring the list or the pill bottles to follow-up visits  Carry your medicine list with you in case of an emergency  Return to the emergency department if:   · You have new or worsening pain, or pain that does not get better with medicine  · You have a fever  · You have trouble moving the body part that has the hematoma      Contact your healthcare provider if:   · You have questions or concerns about your condition or care  Follow up with your healthcare provider as directed: You may need to have surgery if your hematoma is severe  You may also need other tests to make sure there is no other damage that needs to be treated  Write down your questions so you remember to ask them during your visits  Self-care:   · Rest the area  Rest will help your body heal and will also help prevent more damage  · Apply ice as directed  Ice helps reduce swelling  Ice may also help prevent tissue damage  Use an ice pack, or put crushed ice in a bag  Cover it with a towel  Place it on your hematoma for 20 minutes every hour, or as directed  Ask how many times each day to apply ice, and for how many days  · Compress the injury if possible  Lightly wrap the injury with an elastic or soft bandage  This may help control swelling  Ask your healthcare provider how to wrap your injury properly  · Elevate the area as directed  If possible, raise the area above the level of your heart as often as you can  This will help decrease swelling  · Keep the hematoma covered with a bandage  This will help protect the area while it heals  © Copyright [employer] 2021 Information is for End User's use only and may not be sold, redistributed or otherwise used for commercial purposes  All illustrations and images included in CareNotes® are the copyrighted property of [employer]  or [facility]  The above information is an  only  It is not intended as medical advice for individual conditions or treatments  Talk to your doctor, nurse or pharmacist before following any medical regimen to see if it is safe and effective for you

## 2021-08-26 NOTE — PROGRESS NOTES
[employer]        NAME: Gabriella Cabrera is a 70 y o  male  : 1950    MRN: [id number]  DATE: 2021  TIME: 1:54 PM    Assessment and Plan   Acute pain of left knee [M25 562]  1  Acute pain of left knee  XR knee 4+ vw right injury   2  Traumatic hematoma of right knee, initial encounter  oxyCODONE (ROXICODONE) 5 mg immediate release tablet     Appointment scheduled for patient with ortho on   Advised to proceed to the ER is pain or swelling worsens or if he develops a fever  Patient Instructions   Oxycodone for moderate to  severe pain  Tylenol as needed for mild to moderate pain  Rest, ice, and elevate  Follow up with orthopedics as scheduled   If pain or swelling worsens or if you develop a fever proceed to the ER    Follow up with PCP in 3-5 days  Proceed to  ER if symptoms worsen  Chief Complaint     Chief Complaint   Patient presents with    Knee Injury     Tuesday fell onto right knee and now has a lump and extremely painful  The pain goes down the shin and up back of calf  Hasn't been taking anything for pain  History of Present Illness       Patient is a 70year old male presenting with right knee injury  He fell twice onto the right knee 2 days ago  He reports immediate swelling on the anterior surface of the knee  The leg has become increasingly painful, causing difficulty walking  The swelling has improved since initial injury  Pain radiates down the anterior lower leg  Denies calf pain  He is on eliquis  He has been elevating, icing, and using tylenol without improvement  Review of Systems   Review of Systems   Constitutional: Negative for activity change, chills and fever  Cardiovascular: Positive for leg swelling  Musculoskeletal: Positive for arthralgias and joint swelling  Skin: Positive for color change and wound  Neurological: Negative for headaches           Current Medications       Current Outpatient Medications:     apixaban (ELIQUIS) 5 mg, Take one tablet ( 5 mg) every 12 hours, Disp: 180 tablet, Rfl: 3    Ascorbic Acid (vitamin C) 1000 MG tablet, Take 1,000 mg by mouth daily, Disp: , Rfl:     aspirin 81 mg chewable tablet, Chew 81 mg daily, Disp: , Rfl:     Cinnamon 500 MG capsule, Take 500 mg by mouth daily, Disp: , Rfl:     co-enzyme Q-10 50 MG capsule, Take 50 mg by mouth daily, Disp: , Rfl:     magnesium oxide (MAG-OX) 400 mg, Take 400 mg by mouth 2 (two) times a day, Disp: , Rfl:     metFORMIN (GLUCOPHAGE) 500 mg tablet, Take 500 mg by mouth daily, Disp: , Rfl:     metoprolol succinate (TOPROL-XL) 50 mg 24 hr tablet, Take 1 tablet (50 mg total) by mouth daily, Disp: 30 tablet, Rfl: 11    potassium chloride (K-DUR,KLOR-CON) 20 mEq tablet, Take 1 tablet (20 mEq total) by mouth daily, Disp: 30 tablet, Rfl: 3    ramipril (ALTACE) 1 25 mg capsule, Take 1 25 mg by mouth daily  , Disp: , Rfl:     Red Yeast Rice Extract (RED YEAST RICE PO), Take 2 tablets by mouth 2 (two) times a day, Disp: , Rfl:     rizatriptan (MAXALT) 10 MG tablet, Take 10 mg by mouth once as needed for migraine  May repeat in 2 hours if needed, Disp: , Rfl:     spironolactone (ALDACTONE) 25 mg tablet, Take 12 5 mg by mouth 2 (two) times a day, Disp: , Rfl:     torsemide (DEMADEX) 20 mg tablet, Take 1 tablet (20 mg total) by mouth daily, Disp: 90 tablet, Rfl: 3    vitamin B-12 (VITAMIN B-12) 1,000 mcg tablet, Take 5,000 mcg by mouth daily, Disp: , Rfl:     zinc gluconate 50 mg tablet, Take 50 mg by mouth daily, Disp: , Rfl:     albuterol (2 5 mg/3 mL) 0 083 % nebulizer solution, Take 2 5 mg by nebulization every 6 (six) hours as needed for wheezing   (Patient not taking: Reported on 7/29/2021), Disp: , Rfl:     Diclofenac Sodium (VOLTAREN) 1 %, Apply 2 g topically 3 (three) times a day as needed (Pain) (Patient not taking: Reported on 7/29/2021), Disp: 1 Tube, Rfl: 0    finasteride (PROSCAR) 5 mg tablet, Take 5 mg by mouth daily (Patient not taking: Reported on 7/29/2021), Disp: , Rfl:     oxyCODONE (ROXICODONE) 5 mg immediate release tablet, Take 1 tablet (5 mg total) by mouth every 6 (six) hours as needed for moderate painMax Daily Amount: 20 mg, Disp: 12 tablet, Rfl: 0    Current Allergies     Allergies as of 08/26/2021 - Reviewed 08/26/2021   Allergen Reaction Noted    Poultry meal - food allergy  08/18/2016            The following portions of the patient's history were reviewed and updated as appropriate: allergies, current medications, past family history, past medical history, past social history, past surgical history and problem list      Past Medical History:   Diagnosis Date    Atypical chest pain     Back pain     CH[facility]estive [facility] failure) [facility] [address] [facility]     [facility]naire's disease [facility]     Lung disorder     Migraine        Past Surgical History:   Procedure Laterality Date    ROTATOR CUFF REPAIR Bilateral     TONSILLECTOMY      TRIGGER FINGER RELEASE         No family history on file  Medications have been verified  Objective   /69   Pulse 72   Temp 97 7 °F (36 5 °C) (Temporal)   Resp 18   Ht 5' 9" (1 753 m)   Wt 99 8 kg (220 lb)   SpO2 95%   BMI 32 49 kg/m²      Left knee xray: No acute osseous abnormality  Tiny joint effusion      Physical Exam     Physical Exam  Vitals reviewed  Constitutional:       General: He is awake  He is not in acute distress  Appearance: Normal appearance  He is normal weight  HENT:      Head: Normocephalic  Right Ear: Hearing normal       Left Ear: Hearing normal    Cardiovascular:      Rate and Rhythm: Normal rate  Pulmonary:      Effort: Pulmonary effort is normal    Musculoskeletal:        Legs:    Skin:     General: Skin is warm and moist    Neurological:      General: No focal deficit present  Mental Status: He is alert, oriented to person, place, and time and easily aroused  Psychiatric:         Behavior: Behavior is cooperative

## 2021-08-30 ENCOUNTER — TELEPHONE (OUTPATIENT)
Dept: OBGYN CLINIC | Facility: OTHER | Age: 71
End: 2021-08-30

## 2021-08-30 NOTE — TELEPHONE ENCOUNTER
Patients wife called in wanting to know what the plan is when he comes in and sees you tomorrow , she was wondering if she should stop Angelarayo Olivier from taking Eliquis for the appointment tomorrow?     C/b # [phone]

## 2021-08-31 ENCOUNTER — OFFICE VISIT (OUTPATIENT)
Dept: OBGYN CLINIC | Facility: MEDICAL CENTER | Age: 71
End: 2021-08-31
Payer: MEDICARE

## 2021-08-31 VITALS
DIASTOLIC BLOOD PRESSURE: 75 MMHG | HEIGHT: 69 IN | BODY MASS INDEX: 32.49 KG/M2 | SYSTOLIC BLOOD PRESSURE: 115 MMHG | HEART RATE: 48 BPM

## 2021-08-31 DIAGNOSIS — S80.01XA HEMATOMA OF RIGHT KNEE REGION: Primary | ICD-10-CM

## 2021-08-31 DIAGNOSIS — S80.01XA CONTUSION OF RIGHT KNEE, INITIAL ENCOUNTER: ICD-10-CM

## 2021-08-31 PROCEDURE — 99214 OFFICE O/P EST MOD 30 MIN: CPT | Performed by: EMERGENCY MEDICINE

## 2021-08-31 NOTE — PROGRESS NOTES
Assessment/Plan:    Diagnoses and all orders for this visit:    Hematoma of right knee region  -     Ambulatory referral to Plastic Surgery; Future    Contusion of right knee, initial encounter    Sterile aspiration attempt of the right anterior knee mass/hematoma unsuccessful  At this point in time patient would like fluid removed he may need an Incision and drainage  Instructed to watch for signs of infection  Return if symptoms worsen or fail to improve  Chief Complaint:     Chief Complaint   Patient presents with    Right Knee - Follow-up       Subjective:   Patient ID: Shirin Burton is a 70 y o  male  Patient presents for injury and swelling to right anterior knee pain after 2 falls 1 week ago landing on his knee  Was evaluated with Xrays reviewed today, he does have a cane at home has been icing and noticed improvement of swelling  No F/C  On anticoagulation      Review of Systems    The following portions of the patient's chart were reviewed and updated as appropriate:      Allergie  Allergies   Allergen Reactions    Poultry Meal - Food Allergy    s   Allergen Reactions    Poultry Meal - Food Allergy       Diagnosis Date    Atypical chest pain     Back pain     [facility]     [facility]naire's [facility] [address] [facility]     Lung disorder     Migraine        Past Surgical History:   Procedure Laterality Date    ROTATOR CUFF REPAIR Bilateral     TONSILLECTOMY      TRIGGER FINGER RELEASE         Social History     Socioeconomic History    Marital status: /Civil Union     Spouse name: Not on file    Number of children: Not on file    Years of education: Not on file    Highest education level: Not on file   Occupational History    Not on file   Tobacco Use    Smoking status: Former Smoker     Packs/day: 1 00     Years: 25 00     Pack years: 25 00     Types: Cigarettes, Cigars     Quit date:      Years since quittin 6    Smokeless tobacco: Never Used Vaping Use    Vaping Use: Never used   Substance and Sexual Activity    Alcohol use: Yes    Drug use: Not Currently     Comment: CBD Oil pt stated no longer using    Sexual activity: Not on file   Other Topics Concern    Not on file   Social History Narrative    Not on file     Social Determinants of Health     Financial Resource Strain:     Difficulty of Paying Living Expenses:    Food Insecurity:     Worried About Running Out of Food in the Last Year:     [address] in the Last Year:    Transportation Needs:     Lack of Transportation (Medical):  Lack of Transportation (Non-Medical):    Physical Activity:     Days of Exercise per Week:     Minutes of Exercise per Session:    Stress:     Feeling of Stress :    Social Connections:     Frequency of Communication with Friends and Family:     Frequency of Social Gatherings with Friends and Family:     Attends Religion Services:     Active Member of Clubs or Organizations:     Attends Club or Organization Meetings:     Marital Status:    Intimate Partner Violence:     Fear of Current or Ex-Partner:     Emotionally Abused:     Physically Abused:     Sexually Abused:        History reviewed  No pertinent family history      Medications:    Current Outpatient Medications:     apixaban (ELIQUIS) 5 mg, Take one tablet ( 5 mg) every 12 hours, Disp: 180 tablet, Rfl: 3    Ascorbic Acid (vitamin C) 1000 MG tablet, Take 1,000 mg by mouth daily, Disp: , Rfl:     aspirin 81 mg chewable tablet, Chew 81 mg daily, Disp: , Rfl:     Cinnamon 500 MG capsule, Take 500 mg by mouth daily, Disp: , Rfl:     co-enzyme Q-10 50 MG capsule, Take 50 mg by mouth daily, Disp: , Rfl:     magnesium oxide (MAG-OX) 400 mg, Take 400 mg by mouth 2 (two) times a day, Disp: , Rfl:     metFORMIN (GLUCOPHAGE) 500 mg tablet, Take 500 mg by mouth daily, Disp: , Rfl:     metoprolol succinate (TOPROL-XL) 50 mg 24 hr tablet, Take 1 tablet (50 mg total) by mouth daily, Disp: 30 tablet, Rfl: 11    oxyCODONE (ROXICODONE) 5 mg immediate release tablet, Take 1 tablet (5 mg total) by mouth every 6 (six) hours as needed for moderate painMax Daily Amount: 20 mg, Disp: 12 tablet, Rfl: 0    potassium chloride (K-DUR,KLOR-CON) 20 mEq tablet, Take 1 tablet (20 mEq total) by mouth daily, Disp: 30 tablet, Rfl: 3    ramipril (ALTACE) 1 25 mg capsule, Take 1 25 mg by mouth daily  , Disp: , Rfl:     Red Yeast Rice Extract (RED YEAST RICE PO), Take 2 tablets by mouth 2 (two) times a day, Disp: , Rfl:     rizatriptan (MAXALT) 10 MG tablet, Take 10 mg by mouth once as needed for migraine  May repeat in 2 hours if needed, Disp: , Rfl:     spironolactone (ALDACTONE) 25 mg tablet, Take 12 5 mg by mouth 2 (two) times a day, Disp: , Rfl:     torsemide (DEMADEX) 20 mg tablet, Take 1 tablet (20 mg total) by mouth daily, Disp: 90 tablet, Rfl: 3    vitamin B-12 (VITAMIN B-12) 1,000 mcg tablet, Take 5,000 mcg by mouth daily, Disp: , Rfl:     zinc gluconate 50 mg tablet, Take 50 mg by mouth daily, Disp: , Rfl:     albuterol (2 5 mg/3 mL) 0 083 % nebulizer solution, Take 2 5 mg by nebulization every 6 (six) hours as needed for wheezing   (Patient not taking: Reported on 7/29/2021), Disp: , Rfl:     Diclofenac Sodium (VOLTAREN) 1 %, Apply 2 g topically 3 (three) times a day as needed (Pain) (Patient not taking: Reported on 7/29/2021), Disp: 1 Tube, Rfl: 0    finasteride (PROSCAR) 5 mg tablet, Take 5 mg by mouth daily (Patient not taking: Reported on 7/29/2021), Disp: , Rfl:     Patient Active Problem List   Diagnosis    Precordial pain    Chronic low back pain    Systolic congestive heart failure (HCC)    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 [address] [facility]    EKG, abnormal    Cardiomyopathy ([facility] [address] [facility]    Coronary artery disease involving native coronary artery of native heart without angina pectoris    Mixed hyperlipidemia    Stroke due to embolism of left middle cerebral artery (HCC)    Type 2 diabetes mellitus ([facility] [address] [facility]    Pleural effusion    Dyspnea    Acute pain of right shoulder    Primary osteoarthritis of right knee    Nontraumatic complete tear of right rotator cuff    Biceps tendinopathy of right upper extremity       Objective:  /75 (BP Location: Right arm, Patient Position: Sitting, Cuff Size: Standard)   Pulse (!) 48   Ht 5' 9" (1 753 m)   BMI 32 49 kg/m²     Right Knee Exam     Other   Erythema: absent  Sensation: normal  Effusion: no effusion present    Comments:  Anterior localized swelling proxima shin with overlying scab no signs infection this is ttp          Observations     Right Knee   Negative for effusion  Physical Exam  Musculoskeletal:      Right knee: No effusion  Neurologic Exam    Procedures    I have personally reviewed pertinent films in PACS  and I have personally reviewed the written report of the pertinent studies

## 2021-08-31 NOTE — PATIENT INSTRUCTIONS
Contusion in Adults   WHAT YOU NEED TO KNOW:   A contusion is a bruise that appears on your skin after an injury  A bruise happens when small blood vessels tear but skin does not  Blood leaks into nearby tissue, such as soft tissue or muscle  DISCHARGE INSTRUCTIONS:   Return to the emergency department if:   · You have new trouble moving the injured area  · You have tingling or numbness in or near the injured area  · Your hand or foot below the bruise gets cold or turns pale  Call your doctor if:   · You find a new lump in the injured area  · Your symptoms do not improve with treatment after 4 to 5 days  · You have questions or concerns about your condition or care  Medicines: You may need any of the following:  · NSAIDs  help decrease swelling and pain or fever  This medicine is available with or without a doctor's order  NSAIDs can cause stomach bleeding or kidney problems in certain people  If you take blood thinner medicine, always ask your healthcare provider if NSAIDs are safe for you  Always read the medicine label and follow directions  · Prescription pain medicine  may be given  Ask your healthcare provider how to take this medicine safely  Some prescription pain medicines contain acetaminophen  Do not take other medicines that contain acetaminophen without talking to your healthcare provider  Too much acetaminophen may cause liver damage  Prescription pain medicine may cause constipation  Ask your healthcare provider how to prevent or treat constipation  · Take your medicine as directed  Contact your healthcare provider if you think your medicine is not helping or if you have side effects  Tell him of her if you are allergic to any medicine  Keep a list of the medicines, vitamins, and herbs you take  Include the amounts, and when and why you take them  Bring the list or the pill bottles to follow-up visits  Carry your medicine list with you in case of an emergency      Help rayo contusion heal:   · Rest the injured area  or use it less than usual  If you bruised your leg or foot, you may need crutches or a cane to help you walk  This will help you keep weight off your injured body part  · Apply ice  to decrease swelling and pain  Ice may also help prevent tissue damage  Use an ice pack, or put crushed ice in a plastic bag  Cover it with a towel and place it on your bruise for 15 to 20 minutes every hour or as directed  · Use compression  to support the area and decrease swelling  Wrap an elastic bandage around the area over the bruised muscle  Make sure the bandage is not too tight  You should be able to fit 1 finger between the bandage and your skin  · Elevate (raise) your injured body part  above the level of your heart to help decrease pain and swelling  Use pillows, blankets, or rolled towels to elevate the area as often as you can  · Do not drink alcohol  as directed  Alcohol may slow healing  · Do not stretch injured muscles  right after your injury  Ask your healthcare provider when and how you may safely stretch after your injury  Gentle stretches can help increase your flexibility  · Do not massage the area or put heating pads  on the bruise right after your injury  Heat and massage may slow healing  Your healthcare provider may tell you to apply heat after several days  At that time, heat will start to help the injury heal     Prevent another contusion:   · Stretch and warm up before you play sports or exercise  · Wear protective gear when you play sports  Examples are shin guards and padding  · If you begin a new physical activity, start slowly to give your body a chance to adjust     Follow up with your doctor as directed:  Write down your questions so you remember to ask them during your visits  © Copyright [employer] 2021 Information is for End User's use only and may not be sold, redistributed or otherwise used for commercial purposes   All illustrations and images included in CareNotes® are the copyrighted property of [employer]  or Will Kennedy  The above information is an  only  It is not intended as medical advice for individual conditions or treatments  Talk to your doctor, nurse or pharmacist before following any medical regimen to see if it is safe and effective for you

## 2022-01-14 ENCOUNTER — HOSPITAL ENCOUNTER (INPATIENT)
Facility: HOSPITAL | Age: 72
LOS: 2 days | Discharge: HOME | DRG: 177 | End: 2022-01-16
Attending: EMERGENCY MEDICINE | Admitting: HOSPITALIST
Payer: MEDICARE

## 2022-01-14 ENCOUNTER — APPOINTMENT (EMERGENCY)
Dept: CT IMAGING | Facility: HOSPITAL | Age: 72
DRG: 177 | End: 2022-01-14
Payer: MEDICARE

## 2022-01-14 ENCOUNTER — APPOINTMENT (EMERGENCY)
Dept: RADIOLOGY | Facility: HOSPITAL | Age: 72
DRG: 177 | End: 2022-01-14
Payer: MEDICARE

## 2022-01-14 DIAGNOSIS — N17.9 AKI (ACUTE KIDNEY INJURY) (HCC): ICD-10-CM

## 2022-01-14 DIAGNOSIS — U07.1 PNEUMONIA DUE TO COVID-19 VIRUS: ICD-10-CM

## 2022-01-14 DIAGNOSIS — J12.82 PNEUMONIA DUE TO COVID-19 VIRUS: ICD-10-CM

## 2022-01-14 DIAGNOSIS — U07.1 COVID: ICD-10-CM

## 2022-01-14 DIAGNOSIS — U07.1 ACUTE HYPOXEMIC RESPIRATORY FAILURE DUE TO COVID-19 (HCC): Primary | ICD-10-CM

## 2022-01-14 DIAGNOSIS — J96.01 ACUTE HYPOXEMIC RESPIRATORY FAILURE DUE TO COVID-19 (HCC): Primary | ICD-10-CM

## 2022-01-14 PROBLEM — E87.1 HYPONATREMIA: Status: ACTIVE | Noted: 2022-01-14

## 2022-01-14 PROBLEM — R79.89 ELEVATED SERUM CREATININE: Status: ACTIVE | Noted: 2022-01-14

## 2022-01-14 LAB
2HR DELTA HS TROPONIN: 0 NG/L
4HR DELTA HS TROPONIN: 0 NG/L
ABO GROUP BLD: NORMAL
ABO GROUP BLD: NORMAL
ALBUMIN SERPL BCP-MCNC: 3.2 G/DL (ref 3.5–5)
ALP SERPL-CCNC: 51 U/L (ref 46–116)
ALT SERPL W P-5'-P-CCNC: 30 U/L (ref 12–78)
ANION GAP SERPL CALCULATED.3IONS-SCNC: 10 MMOL/L (ref 4–13)
APTT PPP: 33 SECONDS (ref 23–37)
AST SERPL W P-5'-P-CCNC: 43 U/L (ref 5–45)
ATRIAL RATE: 113 BPM
ATRIAL RATE: 152 BPM
BASE EX.OXY STD BLDV CALC-SCNC: 65 % (ref 60–80)
BASE EXCESS BLDV CALC-SCNC: 0.2 MMOL/L
BASOPHILS # BLD AUTO: 0.01 THOUSANDS/ΜL (ref 0–0.1)
BASOPHILS NFR BLD AUTO: 0 % (ref 0–1)
BILIRUB SERPL-MCNC: 0.57 MG/DL (ref 0.2–1)
BLD GP AB SCN SERPL QL: NEGATIVE
BUN SERPL-MCNC: 31 MG/DL (ref 5–25)
CALCIUM ALBUM COR SERPL-MCNC: 9.6 MG/DL (ref 8.3–10.1)
CALCIUM SERPL-MCNC: 9 MG/DL (ref 8.3–10.1)
CARDIAC TROPONIN I PNL SERPL HS: 14 NG/L
CHLORIDE SERPL-SCNC: 97 MMOL/L (ref 100–108)
CK SERPL-CCNC: 79 U/L (ref 39–308)
CO2 SERPL-SCNC: 25 MMOL/L (ref 21–32)
CREAT SERPL-MCNC: 1.39 MG/DL (ref 0.6–1.3)
D DIMER PPP FEU-MCNC: 0.91 UG/ML FEU
EOSINOPHIL # BLD AUTO: 0 THOUSAND/ΜL (ref 0–0.61)
EOSINOPHIL NFR BLD AUTO: 0 % (ref 0–6)
ERYTHROCYTE [DISTWIDTH] IN BLOOD BY AUTOMATED COUNT: 14.2 % (ref 11.6–15.1)
FLUAV RNA RESP QL NAA+PROBE: NEGATIVE
FLUBV RNA RESP QL NAA+PROBE: NEGATIVE
GFR SERPL CREATININE-BSD FRML MDRD: 50 ML/MIN/1.73SQ M
GLUCOSE SERPL-MCNC: 119 MG/DL (ref 65–140)
GLUCOSE SERPL-MCNC: 135 MG/DL (ref 65–140)
HCO3 BLDV-SCNC: 24.3 MMOL/L (ref 24–30)
HCT VFR BLD AUTO: 40.6 % (ref 36.5–49.3)
HGB BLD-MCNC: 13.9 G/DL (ref 12–17)
IMM GRANULOCYTES # BLD AUTO: 0.04 THOUSAND/UL (ref 0–0.2)
IMM GRANULOCYTES NFR BLD AUTO: 1 % (ref 0–2)
INR PPP: 1.07 (ref 0.84–1.19)
LACTATE SERPL-SCNC: 1.4 MMOL/L (ref 0.5–2)
LYMPHOCYTES # BLD AUTO: 0.69 THOUSANDS/ΜL (ref 0.6–4.47)
LYMPHOCYTES NFR BLD AUTO: 14 % (ref 14–44)
MAGNESIUM SERPL-MCNC: 2 MG/DL (ref 1.6–2.6)
MCH RBC QN AUTO: 29.9 PG (ref 26.8–34.3)
MCHC RBC AUTO-ENTMCNC: 34.2 G/DL (ref 31.4–37.4)
MCV RBC AUTO: 87 FL (ref 82–98)
MONOCYTES # BLD AUTO: 0.37 THOUSAND/ΜL (ref 0.17–1.22)
MONOCYTES NFR BLD AUTO: 7 % (ref 4–12)
NEUTROPHILS # BLD AUTO: 3.95 THOUSANDS/ΜL (ref 1.85–7.62)
NEUTS SEG NFR BLD AUTO: 78 % (ref 43–75)
NRBC BLD AUTO-RTO: 0 /100 WBCS
NT-PROBNP SERPL-MCNC: 240 PG/ML
O2 CT BLDV-SCNC: 13.7 ML/DL
P AXIS: 261 DEGREES
P AXIS: 74 DEGREES
PCO2 BLDV: 37.8 MM HG (ref 42–50)
PH BLDV: 7.43 [PH] (ref 7.3–7.4)
PLATELET # BLD AUTO: 177 THOUSANDS/UL (ref 149–390)
PMV BLD AUTO: 9.9 FL (ref 8.9–12.7)
PO2 BLDV: 32.9 MM HG (ref 35–45)
POTASSIUM SERPL-SCNC: 4.1 MMOL/L (ref 3.5–5.3)
PR INTERVAL: 148 MS
PR INTERVAL: 178 MS
PROT SERPL-MCNC: 7.7 G/DL (ref 6.4–8.2)
PROTHROMBIN TIME: 13.9 SECONDS (ref 11.6–14.5)
QRS AXIS: -2 DEGREES
QRS AXIS: 29 DEGREES
QRSD INTERVAL: 90 MS
QRSD INTERVAL: 98 MS
QT INTERVAL: 328 MS
QT INTERVAL: 346 MS
QTC INTERVAL: 440 MS
QTC INTERVAL: 458 MS
RBC # BLD AUTO: 4.65 MILLION/UL (ref 3.88–5.62)
RH BLD: POSITIVE
RH BLD: POSITIVE
RSV RNA RESP QL NAA+PROBE: NEGATIVE
SARS-COV-2 RNA RESP QL NAA+PROBE: POSITIVE
SODIUM SERPL-SCNC: 132 MMOL/L (ref 136–145)
SPECIMEN EXPIRATION DATE: NORMAL
T WAVE AXIS: 18 DEGREES
T WAVE AXIS: 86 DEGREES
VENTRICULAR RATE: 105 BPM
VENTRICULAR RATE: 108 BPM
WBC # BLD AUTO: 5.06 THOUSAND/UL (ref 4.31–10.16)

## 2022-01-14 PROCEDURE — 96375 TX/PRO/DX INJ NEW DRUG ADDON: CPT

## 2022-01-14 PROCEDURE — 93010 ELECTROCARDIOGRAM REPORT: CPT | Performed by: INTERNAL MEDICINE

## 2022-01-14 PROCEDURE — 96374 THER/PROPH/DIAG INJ IV PUSH: CPT

## 2022-01-14 PROCEDURE — 80053 COMPREHEN METABOLIC PANEL: CPT

## 2022-01-14 PROCEDURE — 84484 ASSAY OF TROPONIN QUANT: CPT | Performed by: HOSPITALIST

## 2022-01-14 PROCEDURE — 0241U HB NFCT DS VIR RESP RNA 4 TRGT: CPT

## 2022-01-14 PROCEDURE — 96361 HYDRATE IV INFUSION ADD-ON: CPT

## 2022-01-14 PROCEDURE — 83880 ASSAY OF NATRIURETIC PEPTIDE: CPT

## 2022-01-14 PROCEDURE — XW033E5 INTRODUCTION OF REMDESIVIR ANTI-INFECTIVE INTO PERIPHERAL VEIN, PERCUTANEOUS APPROACH, NEW TECHNOLOGY GROUP 5: ICD-10-PCS | Performed by: INTERNAL MEDICINE

## 2022-01-14 PROCEDURE — 71275 CT ANGIOGRAPHY CHEST: CPT

## 2022-01-14 PROCEDURE — 99285 EMERGENCY DEPT VISIT HI MDM: CPT | Performed by: EMERGENCY MEDICINE

## 2022-01-14 PROCEDURE — 99285 EMERGENCY DEPT VISIT HI MDM: CPT

## 2022-01-14 PROCEDURE — 83735 ASSAY OF MAGNESIUM: CPT | Performed by: EMERGENCY MEDICINE

## 2022-01-14 PROCEDURE — 84484 ASSAY OF TROPONIN QUANT: CPT

## 2022-01-14 PROCEDURE — 71045 X-RAY EXAM CHEST 1 VIEW: CPT

## 2022-01-14 PROCEDURE — 94760 N-INVAS EAR/PLS OXIMETRY 1: CPT

## 2022-01-14 PROCEDURE — 83605 ASSAY OF LACTIC ACID: CPT

## 2022-01-14 PROCEDURE — 86850 RBC ANTIBODY SCREEN: CPT

## 2022-01-14 PROCEDURE — 82805 BLOOD GASES W/O2 SATURATION: CPT

## 2022-01-14 PROCEDURE — G1004 CDSM NDSC: HCPCS

## 2022-01-14 PROCEDURE — 87040 BLOOD CULTURE FOR BACTERIA: CPT

## 2022-01-14 PROCEDURE — 93005 ELECTROCARDIOGRAM TRACING: CPT

## 2022-01-14 PROCEDURE — 85730 THROMBOPLASTIN TIME PARTIAL: CPT

## 2022-01-14 PROCEDURE — 85610 PROTHROMBIN TIME: CPT

## 2022-01-14 PROCEDURE — 85379 FIBRIN DEGRADATION QUANT: CPT

## 2022-01-14 PROCEDURE — 82948 REAGENT STRIP/BLOOD GLUCOSE: CPT

## 2022-01-14 PROCEDURE — 94002 VENT MGMT INPAT INIT DAY: CPT

## 2022-01-14 PROCEDURE — 82550 ASSAY OF CK (CPK): CPT

## 2022-01-14 PROCEDURE — 86901 BLOOD TYPING SEROLOGIC RH(D): CPT

## 2022-01-14 PROCEDURE — 86900 BLOOD TYPING SEROLOGIC ABO: CPT

## 2022-01-14 PROCEDURE — 85025 COMPLETE CBC W/AUTO DIFF WBC: CPT

## 2022-01-14 PROCEDURE — 36415 COLL VENOUS BLD VENIPUNCTURE: CPT

## 2022-01-14 PROCEDURE — 99223 1ST HOSP IP/OBS HIGH 75: CPT | Performed by: HOSPITALIST

## 2022-01-14 PROCEDURE — 1124F ACP DISCUSS-NO DSCNMKR DOCD: CPT | Performed by: INTERNAL MEDICINE

## 2022-01-14 RX ORDER — METOPROLOL SUCCINATE 50 MG/1
50 TABLET, EXTENDED RELEASE ORAL DAILY
Status: DISCONTINUED | OUTPATIENT
Start: 2022-01-15 | End: 2022-01-16 | Stop reason: HOSPADM

## 2022-01-14 RX ORDER — DEXAMETHASONE SODIUM PHOSPHATE 4 MG/ML
6 INJECTION, SOLUTION INTRA-ARTICULAR; INTRALESIONAL; INTRAMUSCULAR; INTRAVENOUS; SOFT TISSUE ONCE
Status: DISCONTINUED | OUTPATIENT
Start: 2022-01-14 | End: 2022-01-14

## 2022-01-14 RX ORDER — SODIUM CHLORIDE 9 MG/ML
75 INJECTION, SOLUTION INTRAVENOUS CONTINUOUS
Status: DISPENSED | OUTPATIENT
Start: 2022-01-14 | End: 2022-01-15

## 2022-01-14 RX ORDER — DEXAMETHASONE SODIUM PHOSPHATE 4 MG/ML
6 INJECTION, SOLUTION INTRA-ARTICULAR; INTRALESIONAL; INTRAMUSCULAR; INTRAVENOUS; SOFT TISSUE EVERY 24 HOURS
Status: DISCONTINUED | OUTPATIENT
Start: 2022-01-15 | End: 2022-01-16 | Stop reason: HOSPADM

## 2022-01-14 RX ORDER — ASPIRIN 81 MG/1
81 TABLET, CHEWABLE ORAL DAILY
Status: DISCONTINUED | OUTPATIENT
Start: 2022-01-15 | End: 2022-01-16 | Stop reason: HOSPADM

## 2022-01-14 RX ORDER — OXYCODONE HYDROCHLORIDE 5 MG/1
5 TABLET ORAL EVERY 6 HOURS PRN
Status: DISCONTINUED | OUTPATIENT
Start: 2022-01-14 | End: 2022-01-16 | Stop reason: HOSPADM

## 2022-01-14 RX ORDER — ONDANSETRON 2 MG/ML
4 INJECTION INTRAMUSCULAR; INTRAVENOUS EVERY 6 HOURS PRN
Status: DISCONTINUED | OUTPATIENT
Start: 2022-01-14 | End: 2022-01-16 | Stop reason: HOSPADM

## 2022-01-14 RX ORDER — DEXAMETHASONE SODIUM PHOSPHATE 4 MG/ML
6 INJECTION, SOLUTION INTRA-ARTICULAR; INTRALESIONAL; INTRAMUSCULAR; INTRAVENOUS; SOFT TISSUE ONCE
Status: COMPLETED | OUTPATIENT
Start: 2022-01-14 | End: 2022-01-14

## 2022-01-14 RX ORDER — ATORVASTATIN CALCIUM 40 MG/1
40 TABLET, FILM COATED ORAL
Status: DISCONTINUED | OUTPATIENT
Start: 2022-01-14 | End: 2022-01-16 | Stop reason: HOSPADM

## 2022-01-14 RX ADMIN — IOHEXOL 85 ML: 350 INJECTION, SOLUTION INTRAVENOUS at 17:48

## 2022-01-14 RX ADMIN — REMDESIVIR 200 MG: 100 INJECTION, POWDER, LYOPHILIZED, FOR SOLUTION INTRAVENOUS at 17:55

## 2022-01-14 RX ADMIN — SODIUM CHLORIDE 75 ML/HR: 0.9 INJECTION, SOLUTION INTRAVENOUS at 21:12

## 2022-01-14 RX ADMIN — SODIUM CHLORIDE 1000 ML: 0.9 INJECTION, SOLUTION INTRAVENOUS at 16:21

## 2022-01-14 RX ADMIN — DEXAMETHASONE SODIUM PHOSPHATE 6 MG: 4 INJECTION, SOLUTION INTRAMUSCULAR; INTRAVENOUS at 17:33

## 2022-01-14 RX ADMIN — ATORVASTATIN CALCIUM 40 MG: 40 TABLET, FILM COATED ORAL at 21:10

## 2022-01-14 RX ADMIN — APIXABAN 5 MG: 5 TABLET, FILM COATED ORAL at 21:09

## 2022-01-14 RX ADMIN — BARICITINIB 2 MG: 2 TABLET, FILM COATED ORAL at 21:09

## 2022-01-14 NOTE — ED PROVIDER NOTES
History  Chief Complaint   Patient presents with    Shortness of Breath     Patient presents with c/o weakness/SOB/cough/diarrhea/loss of appitie x2wks  Daughter covid +  Room air saturation 87%     Mr Ines Vazquez is a 77-year-old male with COVID like symptoms including diarrhea, generalized weakness, cough, loss of appetite, and fatigue for the past 2-3 weeks  Diarrhea has significantly improved, however he endorses increasing fatigue, generalized weakness, poor PO intake, and myalgias  Denies headache, lightheadedness, sore throat, chest pain, shortness of breath, abdominal pain, nausea, vomiting, dysuria, hematuria, decreased urine, peripheral edema, or unilateral calf tenderness or swelling  No history of DVT or PE, not vaccinated for COVID or flu  Daughter and wife have [place]  Prior to Admission Medications   Prescriptions Last Dose Informant Patient Reported? Taking? Ascorbic Acid (vitamin C) 1000 MG tablet  Self Yes No   Sig: Take 1,000 mg by mouth daily   Cinnamon 500 MG capsule  Self Yes No   Sig: Take 500 mg by mouth daily   Diclofenac Sodium (VOLTAREN) 1 %  Self No No   Sig: Apply 2 g topically 3 (three) times a day as needed (Pain)   Patient not taking: Reported on 7/29/2021   Red Yeast Rice Extract (RED YEAST RICE PO)  Self Yes No   Sig: Take 2 tablets by mouth 2 (two) times a day   albuterol (2 5 mg/3 mL) 0 083 % nebulizer solution Not Taking at Unknown time Self Yes No   Sig: Take 2 5 mg by nebulization every 6 (six) hours as needed for wheezing     Patient not taking: Reported on 7/29/2021   apixaban (ELIQUIS) 5 mg Past Week at Unknown time Self No Yes   Sig: Take one tablet ( 5 mg) every 12 hours   aspirin 81 mg chewable tablet  Self Yes No   Sig: Chew 81 mg daily   co-enzyme Q-10 50 MG capsule  Self Yes No   Sig: Take 50 mg by mouth daily   finasteride (PROSCAR) 5 mg tablet  Self Yes No   Sig: Take 5 mg by mouth daily   Patient not taking: Reported on 7/29/2021   magnesium oxide (MAG-OX) 400 mg  Self Yes No   Sig: Take 400 mg by mouth 2 (two) times a day   metFORMIN (GLUCOPHAGE) 500 mg tablet  Self Yes No   Sig: Take 500 mg by mouth daily   metoprolol succinate (TOPROL-XL) 50 mg 24 hr tablet  Self No No   Sig: Take 1 tablet (50 mg total) by mouth daily   oxyCODONE (ROXICODONE) 5 mg immediate release tablet   No No   Sig: Take 1 tablet (5 mg total) by mouth every 6 (six) hours as needed for moderate painMax Daily Amount: 20 mg   potassium chloride (K-DUR,KLOR-CON) 20 mEq tablet  Self No No   Sig: Take 1 tablet (20 mEq total) by mouth daily   ramipril (ALTACE) 1 25 mg capsule  Self Yes No   Sig: Take 1 25 mg by mouth daily  rizatriptan (MAXALT) 10 MG tablet  Self Yes No   Sig: Take 10 mg by mouth once as needed for migraine  May repeat in 2 hours if needed   spironolactone (ALDACTONE) 25 mg tablet  Self Yes No   Sig: Take 12 5 mg by mouth 2 (two) times a day   torsemide (DEMADEX) 20 mg tablet  Self No No   Sig: Take 1 tablet (20 mg total) by mouth daily   vitamin B-12 (VITAMIN B-12) 1,000 mcg tablet  Self Yes No   Sig: Take 5,000 mcg by mouth daily   zinc gluconate 50 mg tablet  Self Yes No   Sig: Take 50 mg by mouth daily      Facility-Administered Medications: None       Past Medical History:   Diagnosis Date    Atypical chest pain     Back pain     [facility] [address] [facility]     [facility]'s disease ([facility]     Lung disorder     Migraine        Past Surgical History:   Procedure Laterality Date    ROTATOR CUFF REPAIR Bilateral     TONSILLECTOMY      TRIGGER FINGER RELEASE         History reviewed  No pertinent family history  I have reviewed and agree with the history as documented      E-Cigarette/Vaping    E-Cigarette Use Never User      E-Cigarette/Vaping Substances    Nicotine No     THC No     CBD No     Flavoring No     Other No     Unknown No      Social History     Tobacco Use    Smoking status: Former Smoker     Packs/day: 1 00     Years: 25 00     Pack years: 25 00     Types: Cigarettes, Cigars     Quit date:      Years since quittin 0    Smokeless tobacco: Never Used   Vaping Use    Vaping Use: Never used   Substance Use Topics    Alcohol use: Yes    Drug use: Not Currently     Comment: CBD Oil pt stated no longer using        Review of Systems   Constitutional: Positive for activity change, appetite change and fatigue  Negative for chills, diaphoresis and fever  HENT: Negative  Negative for congestion, rhinorrhea, sneezing and sore throat  Eyes: Negative  Negative for visual disturbance  Respiratory: Positive for cough  Negative for shortness of breath  Cardiovascular: Negative  Negative for chest pain, palpitations and leg swelling  Gastrointestinal: Negative  Negative for abdominal distention, abdominal pain, constipation, diarrhea, nausea and vomiting  Genitourinary: Negative  Negative for difficulty urinating, dysuria and hematuria  Musculoskeletal: Positive for myalgias  Negative for arthralgias  Skin: Positive for pallor  Negative for color change, rash and wound  Allergic/Immunologic: Negative  Neurological: Positive for weakness  Negative for dizziness, light-headedness and headaches  Generalized weakness  Hematological: Negative  Does not bruise/bleed easily  Psychiatric/Behavioral: Negative  Negative for confusion  All other systems reviewed and are negative        Physical Exam  ED Triage Vitals   Temperature Pulse Respirations Blood Pressure SpO2   22 1515 22 1515 22 1515 22 1515 22 1515   98 2 °F (36 8 °C) (!) 108 18 116/69 (!) 88 %      Temp Source Heart Rate Source Patient Position - Orthostatic VS BP Location FiO2 (%)   22 1515 22 1515 22 1645 22 1515 --   Oral Monitor Lying Left arm       Pain Score       --                    Orthostatic Vital Signs  Vitals:    22 1645 22 1730 22 1845 22 1930   BP: 131/57 118/69 127/68 118/67   Pulse: 96 92 94 96   Patient Position - Orthostatic VS: Lying Lying Lying Lying       Physical Exam  Vitals and nursing note reviewed  Constitutional:       General: He is not in acute distress  Appearance: Normal appearance  He is ill-appearing  He is not diaphoretic  Interventions: Nasal cannula in place  Comments: Mid flow nasal cannula  HENT:      Head: Normocephalic and atraumatic  Right Ear: External ear normal       Left Ear: External ear normal       Nose: Nose normal       Mouth/Throat:      Mouth: Mucous membranes are moist       Pharynx: Oropharynx is clear  Eyes:      General: No scleral icterus  Extraocular Movements: Extraocular movements intact  Conjunctiva/sclera: Conjunctivae normal    Cardiovascular:      Rate and Rhythm: Regular rhythm  Tachycardia present  Pulses: Normal pulses  Heart sounds: Normal heart sounds  No murmur heard  No friction rub  No gallop  Pulmonary:      Effort: Pulmonary effort is normal  Tachypnea present  No accessory muscle usage or respiratory distress  Breath sounds: Rales present  No decreased breath sounds, wheezing or rhonchi  Comments: Bilateral lower lobe rales  Abdominal:      General: Abdomen is flat  Bowel sounds are normal  There is no distension  Palpations: Abdomen is soft  Tenderness: There is no abdominal tenderness  There is no guarding or rebound  Musculoskeletal:         General: Normal range of motion  Cervical back: Normal range of motion and neck supple  No tenderness  Right lower leg: No tenderness  No edema  Left lower leg: No tenderness  No edema  Lymphadenopathy:      Cervical: No cervical adenopathy  Skin:     General: Skin is warm and dry  Capillary Refill: Capillary refill takes less than 2 seconds  Coloration: Skin is pale  Findings: No rash  Neurological:      General: No focal deficit present        Mental Status: He is alert and oriented to person, place, and time  Motor: Weakness present  Psychiatric:         Mood and Affect: Mood normal          Behavior: Behavior normal          ED Medications  Medications   atorvastatin (LIPITOR) tablet 40 mg (has no administration in time range)   remdesivir (Veklury) 200 mg in sodium chloride 0 9 % 290 mL IVPB (200 mg Intravenous Given 1/14/22 1755)     Followed by   remdesivir Radene Stack) 100 mg in sodium chloride 0 9 % 270 mL IVPB (has no administration in time range)   enoxaparin (LOVENOX) subcutaneous injection 30 mg (has no administration in time range)   sodium chloride 0 9 % infusion (has no administration in time range)   sodium chloride 0 9 % bolus 1,000 mL (0 mL Intravenous Stopped 1/14/22 1721)   dexamethasone (DECADRON) injection 6 mg (6 mg Intravenous Given 1/14/22 1733)   iohexol (OMNIPAQUE) 350 MG/ML injection (SINGLE-DOSE) 100 mL (85 mL Intravenous Given 1/14/22 1748)       Diagnostic Studies  Results Reviewed     Procedure Component Value Units Date/Time    HS Troponin I 2hr [[id number]] Collected: 01/14/22 1839    Lab Status: Final result Specimen: Blood from Arm, Left Updated: 01/14/22 1912     hs TnI 2hr 14 ng/L      Delta 2hr hsTnI 0 ng/L     HS Troponin I 4hr [[id number]]     Lab Status: No result Specimen: Blood     Magnesium [[id number]]  (Normal) Collected: 01/14/22 1617    Lab Status: Final result Specimen: Blood from Arm, Left Updated: 01/14/22 1754     Magnesium 2 0 mg/dL     COVID/FLU/RSV - 2 hour TAT [[id number]]  (Abnormal) Collected: 01/14/22 1617    Lab Status: Final result Specimen: Nares from Nasopharyngeal Swab Updated: 01/14/22 1708     SARS-CoV-2 Positive     INFLUENZA A PCR Negative     INFLUENZA B PCR Negative     RSV PCR Negative    Narrative:      FOR PEDIATRIC PATIENTS - copy/paste COVID Guidelines URL to browser: [email or web address]    SARS-CoV-2 assay is a Nucleic Acid Amplification assay intended for the  qualitative detection of nucleic acid from SARS-CoV-2 in nasopharyngeal  swabs  Results are for the presumptive identification of SARS-CoV-2 RNA  Positive results are indicative of infection with SARS-CoV-2, the virus  causing COVID-19, but do not rule out bacterial infection or co-infection  with other viruses  Laboratories within the [place] and its  territories are required to report all positive results to the appropriate  public health authorities  Negative results do not preclude SARS-CoV-2  infection and should not be used as the sole basis for treatment or other  patient management decisions  Negative results must be combined with  clinical observations, patient history, and epidemiological information  This test has not been FDA cleared or approved  This test has been authorized by FDA under an Emergency Use Authorization  (EUA)  This test is only authorized for the duration of time the  declaration that circumstances exist justifying the authorization of the  emergency use of an in vitro diagnostic tests for detection of SARS-CoV-2  virus and/or diagnosis of COVID-19 infection under section 564(b)(1) of  the Act, 21 U  [place]  723ASI-1(Z)(0), unless the authorization is terminated  or revoked sooner  The test has been validated but independent review by FDA  and CLIA is pending  Test performed using [employer] GeneXpert: This RT-PCR assay targets N2,  a region unique to SARS-CoV-2  A conserved region in the E-gene was chosen  for pan-Sarbecovirus detection which includes SARS-CoV-2      CK Total with Reflex CKMB [[id number]]  (Normal) Collected: 01/14/22 1617    Lab Status: Final result Specimen: Blood from Arm, Left Updated: 01/14/22 1700     Total CK 79 U/L     NT-BNP PRO [[id number]]  (Abnormal) Collected: 01/14/22 1617    Lab Status: Final result Specimen: Blood from Arm, Left Updated: 01/14/22 1700     NT-proBNP 240 pg/mL     HS Troponin 0hr (reflex protocol) [[id number]] (Normal) Collected: 01/14/22 1617    Lab Status: Final result Specimen: Blood from Arm, Left Updated: 01/14/22 1700     hs TnI 0hr 14 ng/L     Lactic acid [[id number]]  (Normal) Collected: 01/14/22 1617    Lab Status: Final result Specimen: Blood from Arm, Left Updated: 01/14/22 1655     LACTIC ACID 1 4 mmol/L     Narrative:      Result may be elevated if tourniquet was used during collection      CBC and differential [[id number]]  (Abnormal) Collected: 01/14/22 1617    Lab Status: Final result Specimen: Blood from Arm, Left Updated: 01/14/22 1655     WBC 5 06 Thousand/uL      RBC 4 65 Million/uL      Hemoglobin 13 9 g/dL      Hematocrit 40 6 %      MCV 87 fL      MCH 29 9 pg      MCHC 34 2 g/dL      RDW 14 2 %      MPV 9 9 fL      Platelets 550 Thousands/uL      nRBC 0 /100 WBCs      Neutrophils Relative 78 %      Immat GRANS % 1 %      Lymphocytes Relative 14 %      Monocytes Relative 7 %      Eosinophils Relative 0 %      Basophils Relative 0 %      Neutrophils Absolute 3 95 Thousands/µL      Immature Grans Absolute 0 04 Thousand/uL      Lymphocytes Absolute 0 69 Thousands/µL      Monocytes Absolute 0 37 Thousand/µL      Eosinophils Absolute 0 00 Thousand/µL      Basophils Absolute 0 01 Thousands/µL     D-Dimer [[id number]]  (Abnormal) Collected: 01/14/22 1617    Lab Status: Final result Specimen: Blood from Arm, Left Updated: 01/14/22 1653     D-Dimer, Quant 0 91 ug/ml FEU     Comprehensive metabolic panel [[id number]]  (Abnormal) Collected: 01/14/22 1617    Lab Status: Final result Specimen: Blood from Arm, Left Updated: 01/14/22 1652     Sodium 132 mmol/L      Potassium 4 1 mmol/L      Chloride 97 mmol/L      CO2 25 mmol/L      ANION GAP 10 mmol/L      BUN 31 mg/dL      Creatinine 1 39 mg/dL      Glucose 135 mg/dL      Calcium 9 0 mg/dL      Corrected Calcium 9 6 mg/dL      AST 43 U/L      ALT 30 U/L      Alkaline Phosphatase 51 U/L      Total Protein 7 7 g/dL      Albumin 3 2 g/dL      Total Bilirubin 0 57 mg/dL eGFR 50 ml/min/1 73sq m     Narrative:      [place] guidelines for Chronic Kidney Disease (CKD):     Stage 1 with normal or high GFR (GFR > 90 mL/min/1 73 square meters)    Stage 2 Mild CKD (GFR = 60-89 mL/min/1 73 square meters)    Stage 3A Moderate CKD (GFR = 45-59 mL/min/1 73 square meters)    Stage 3B Moderate CKD (GFR = 30-44 mL/min/1 73 square meters)    Stage 4 Severe CKD (GFR = 15-29 mL/min/1 73 square meters)    Stage 5 End Stage CKD (GFR <15 mL/min/1 73 square meters)  Note: GFR calculation is accurate only with a steady state creatinine    Blood culture #1 [[id number]] Collected: 01/14/22 1637    Lab Status: In process Specimen: Blood from Arm, Right Updated: 01/14/22 [address] [place] [[id number]]  (Normal) Collected: 01/14/22 1617    Lab Status: Final result Specimen: Blood from Arm, Left Updated: 01/14/22 1645     Protime 13 9 seconds      INR 1 07    APTT [[id number]]  (Normal) Collected: 01/14/22 1617    Lab Status: Final result Specimen: Blood from Arm, Left Updated: 01/14/22 1645     PTT 33 seconds     Blood gas, venous [[id number]]  (Abnormal) Collected: 01/14/22 1617    Lab Status: Final result Specimen: Blood from Arm, Left Updated: 01/14/22 1639     pH, Tomy 7 426     pCO2, Tomy 37 8 mm Hg      pO2, Tomy 32 9 mm Hg      HCO3, Tomy 24 3 mmol/L      Base Excess, Tomy 0 2 mmol/L      O2 Content, Tomy 13 7 ml/dL      O2 HGB, VENOUS 65 0 %     Blood culture #2 [[id number]] Collected: 01/14/22 1617    Lab Status: In process Specimen: Blood from Arm, Left Updated: 01/14/22 1627                 CTA ED chest PE Study   Final Result by Juan M Valdes MD (01/14 1793)         1  Findings consistent with COVID-19 pneumonia  2   No evidence of acute pulmonary embolus, thoracic aortic aneurysm or dissection                    Workstation performed: [id number]         XR chest 1 view portable    (Results Pending)         Procedures  ECG 12 Lead Documentation Only    Date/Time: 1/14/2022 3:23 PM  Performed by: Néstor Stewart DO  Authorized by: Néstor Stewart DO     Indications / Diagnosis:  108  Patient location:  ED  Interpretation:     Interpretation: non-specific    Rate:     ECG rate:  108    ECG rate assessment: tachycardic    Rhythm:     Rhythm: sinus tachycardia    Ectopy:     Ectopy: none    QRS:     QRS axis:  Normal    QRS intervals:  Normal  Conduction:     Conduction: normal    ST segments:     ST segments:  Normal  T waves:     T waves: normal    ECG 12 Lead Documentation Only    Date/Time: 1/14/2022 4:20 PM  Performed by: Néstor Stewart DO  Authorized by: Néstor Stewart DO     Indications / Diagnosis:  Hypoxia  Patient location:  ED  Previous ECG:     Previous ECG:  Compared to current    Comparison ECG info:  1/14/2022@ 1520    Similarity:  Changes noted    Comparison to cardiac monitor: Yes    Interpretation:     Interpretation: abnormal    Rate:     ECG rate:  105    ECG rate assessment: tachycardic    Rhythm:     Rhythm: sinus tachycardia    Ectopy:     Ectopy: PAC      Ectopy comment:  PAC's coupled to every beat  QRS:     QRS axis:  Left    QRS intervals:  Normal  Conduction:     Conduction: normal    ST segments:     ST segments:  Normal  T waves:     T waves: normal    Other findings:     Other findings: poor R wave progression            ED Course  ED Course as of 01/14/22 2001 Fri Jan 14, 2022   1540 SpO2 73% on room air  1552 SpO2 93% in 7L midflow  1625 Pt with brief bradycardic episode to mid 40's, confirmed by radial pulse, with ECG changes after event  Per Dr Papito Benites, cardiologist, pt is sinus rhythm with PAC's  No acute intervention required  200 Second [facility] ED chest PE Study  No PE, findings consistent with COVID pneumonia                                          MDM  Number of Diagnoses or Management Options  Acute hypoxemic respiratory failure due to COVID-19 [facility]): new and requires workup  EDWIN (acute kidney injury) [facility]): new and requires workup  COVID: new and requires workup  Pneumonia due to COVID-19 virus: new and requires workup  Diagnosis management comments: Mr Radha Barahona is a 70 yom presenting with generalized weakness and fatigue, HPI and ROS as above  Pt is ill appearing, in no apparent distress  Pale with bilateral lower lobe rales, no evidence of DVT  Remainder of physical exam above  Initial SpO2 73% on room air, requiring 7L oxygen via midflow to maintain SpO2 >92%  Labs: COVID positive  CMP with AKA, creatinine 1 39, hyponatremia of 132  D-dimer elevated, 0 91  VBG with mild respiratory alkalosis  CBC, lactic acid, troponin, magnesium, CK, coags unremarkable  Initial EKG sinus tach, rate 108 without evidence of ischemia or infarct  Patient had a bradycardic episode, rate 40s, lasting less than 1 minute  After rate increased to baseline, frequent PACs noted on EKG, however no evidence of ischemia or infarct present  No acute intervention required per Dr Valeri Boxer, cardiologist     Chest x-ray consistent with COVID-19 pneumonia  CT PE study negative for PE, consistent with COVID-19 pneumonia  Started on moderate COVID pathway, will admit to [facility] for acute hypoxemic respiratory failure secondary to COVID-19, EDWIN  Stable on 7 L oxygen via mid flow nasal cannula on admission         Amount and/or Complexity of Data Reviewed  Clinical lab tests: ordered and reviewed  Tests in the radiology section of CPT®: ordered and reviewed  Tests in the medicine section of CPT®: ordered and reviewed  Review and summarize past medical records: yes  Discuss the patient with other providers: yes  Independent visualization of images, tracings, or specimens: yes        Disposition  Final diagnoses:   Acute hypoxemic respiratory failure due to COVID-19 ([facility] [address] )   Pneumonia due to COVID-19 virus   COVID   [facility]     Time reflects when diagnosis was documented in both MDM as applicable and the Disposition within this note     Time User Action Codes Description Comment    1/14/2022  7:14 PM Mercedes Hernandez Add [U07 1,  J96 01] Acute hypoxemic respiratory failure due to COVID-19 [facility])     1/14/2022  7:14 PM Kathia Hernandez Add [U07 1,  J12 82] Pneumonia due to COVID-19 virus     1/14/2022  7:14 PM Clebrucea Hernandez Add [U07 1] COVID     1/14/2022  7:15 PM Kathirayo Hernandez Add [N17 9] EDWIN (acute kidney injury) [facility])       ED Disposition     ED Disposition Condition Date/Time Comment    Admit Stable Fri Jan 14, 2022  7:14 PM Case was discussed with Dr Jay Pendleton and the patient's admission status was agreed to be Admission Status: inpatient status to the service of Dr Jay Pendleton   Follow-up Information    None         Patient's Medications   Discharge Prescriptions    No medications on file     No discharge procedures on file  PDMP Review     None           ED Provider  Attending physically available and evaluated [facility]  KAJAL managed the patient along with the ED Attending      Electronically Signed by         Sim Harada, DO  01/14/22 2001

## 2022-01-15 LAB
ALBUMIN SERPL BCP-MCNC: 2.8 G/DL (ref 3.5–5)
ALP SERPL-CCNC: 47 U/L (ref 46–116)
ALT SERPL W P-5'-P-CCNC: 26 U/L (ref 12–78)
ANION GAP SERPL CALCULATED.3IONS-SCNC: 9 MMOL/L (ref 4–13)
AST SERPL W P-5'-P-CCNC: 38 U/L (ref 5–45)
BASOPHILS # BLD MANUAL: 0 THOUSAND/UL (ref 0–0.1)
BASOPHILS NFR MAR MANUAL: 0 % (ref 0–1)
BILIRUB SERPL-MCNC: 0.36 MG/DL (ref 0.2–1)
BUN SERPL-MCNC: 26 MG/DL (ref 5–25)
CALCIUM ALBUM COR SERPL-MCNC: 9.7 MG/DL (ref 8.3–10.1)
CALCIUM SERPL-MCNC: 8.7 MG/DL (ref 8.3–10.1)
CHLORIDE SERPL-SCNC: 101 MMOL/L (ref 100–108)
CO2 SERPL-SCNC: 24 MMOL/L (ref 21–32)
CREAT SERPL-MCNC: 1.04 MG/DL (ref 0.6–1.3)
CRP SERPL QL: 60 MG/L
EOSINOPHIL # BLD MANUAL: 0 THOUSAND/UL (ref 0–0.4)
EOSINOPHIL NFR BLD MANUAL: 0 % (ref 0–6)
ERYTHROCYTE [DISTWIDTH] IN BLOOD BY AUTOMATED COUNT: 14 % (ref 11.6–15.1)
GFR SERPL CREATININE-BSD FRML MDRD: 71 ML/MIN/1.73SQ M
GLUCOSE SERPL-MCNC: 152 MG/DL (ref 65–140)
GLUCOSE SERPL-MCNC: 152 MG/DL (ref 65–140)
GLUCOSE SERPL-MCNC: 158 MG/DL (ref 65–140)
GLUCOSE SERPL-MCNC: 269 MG/DL (ref 65–140)
GLUCOSE SERPL-MCNC: 269 MG/DL (ref 65–140)
HCT VFR BLD AUTO: 40.1 % (ref 36.5–49.3)
HGB BLD-MCNC: 13.6 G/DL (ref 12–17)
LYMPHOCYTES # BLD AUTO: 0.46 THOUSAND/UL (ref 0.6–4.47)
LYMPHOCYTES # BLD AUTO: 23 % (ref 14–44)
MCH RBC QN AUTO: 30 PG (ref 26.8–34.3)
MCHC RBC AUTO-ENTMCNC: 33.9 G/DL (ref 31.4–37.4)
MCV RBC AUTO: 89 FL (ref 82–98)
MONOCYTES # BLD AUTO: 0.2 THOUSAND/UL (ref 0–1.22)
MONOCYTES NFR BLD: 10 % (ref 4–12)
NEUTROPHILS # BLD MANUAL: 1.34 THOUSAND/UL (ref 1.85–7.62)
NEUTS SEG NFR BLD AUTO: 67 % (ref 43–75)
PLATELET # BLD AUTO: 188 THOUSANDS/UL (ref 149–390)
PLATELET BLD QL SMEAR: ADEQUATE
PMV BLD AUTO: 10 FL (ref 8.9–12.7)
POTASSIUM SERPL-SCNC: 4.3 MMOL/L (ref 3.5–5.3)
PROCALCITONIN SERPL-MCNC: 0.1 NG/ML
PROT SERPL-MCNC: 6.9 G/DL (ref 6.4–8.2)
RBC # BLD AUTO: 4.53 MILLION/UL (ref 3.88–5.62)
RBC MORPH BLD: NORMAL
SODIUM SERPL-SCNC: 134 MMOL/L (ref 136–145)
WBC # BLD AUTO: 2 THOUSAND/UL (ref 4.31–10.16)

## 2022-01-15 PROCEDURE — 99232 SBSQ HOSP IP/OBS MODERATE 35: CPT | Performed by: INTERNAL MEDICINE

## 2022-01-15 PROCEDURE — 82948 REAGENT STRIP/BLOOD GLUCOSE: CPT

## 2022-01-15 PROCEDURE — 85007 BL SMEAR W/DIFF WBC COUNT: CPT | Performed by: HOSPITALIST

## 2022-01-15 PROCEDURE — 80053 COMPREHEN METABOLIC PANEL: CPT | Performed by: HOSPITALIST

## 2022-01-15 PROCEDURE — 85027 COMPLETE CBC AUTOMATED: CPT | Performed by: HOSPITALIST

## 2022-01-15 PROCEDURE — 84145 PROCALCITONIN (PCT): CPT | Performed by: HOSPITALIST

## 2022-01-15 PROCEDURE — 94760 N-INVAS EAR/PLS OXIMETRY 1: CPT

## 2022-01-15 PROCEDURE — 86140 C-REACTIVE PROTEIN: CPT | Performed by: HOSPITALIST

## 2022-01-15 RX ADMIN — REMDESIVIR 100 MG: 100 INJECTION, POWDER, LYOPHILIZED, FOR SOLUTION INTRAVENOUS at 17:11

## 2022-01-15 RX ADMIN — OXYCODONE HYDROCHLORIDE 5 MG: 5 TABLET ORAL at 19:01

## 2022-01-15 RX ADMIN — ASPIRIN 81 MG CHEWABLE TABLET 81 MG: 81 TABLET CHEWABLE at 09:16

## 2022-01-15 RX ADMIN — DEXAMETHASONE SODIUM PHOSPHATE 6 MG: 4 INJECTION, SOLUTION INTRAMUSCULAR; INTRAVENOUS at 17:10

## 2022-01-15 RX ADMIN — METOPROLOL SUCCINATE 50 MG: 50 TABLET, EXTENDED RELEASE ORAL at 09:16

## 2022-01-15 RX ADMIN — INSULIN LISPRO 3 UNITS: 100 INJECTION, SOLUTION INTRAVENOUS; SUBCUTANEOUS at 13:58

## 2022-01-15 RX ADMIN — APIXABAN 5 MG: 5 TABLET, FILM COATED ORAL at 17:11

## 2022-01-15 RX ADMIN — INSULIN LISPRO 1 UNITS: 100 INJECTION, SOLUTION INTRAVENOUS; SUBCUTANEOUS at 09:16

## 2022-01-15 RX ADMIN — ATORVASTATIN CALCIUM 40 MG: 40 TABLET, FILM COATED ORAL at 21:12

## 2022-01-15 RX ADMIN — INSULIN LISPRO 3 UNITS: 100 INJECTION, SOLUTION INTRAVENOUS; SUBCUTANEOUS at 21:12

## 2022-01-15 RX ADMIN — BARICITINIB 2 MG: 2 TABLET, FILM COATED ORAL at 21:12

## 2022-01-15 RX ADMIN — INSULIN LISPRO 1 UNITS: 100 INJECTION, SOLUTION INTRAVENOUS; SUBCUTANEOUS at 17:11

## 2022-01-15 RX ADMIN — APIXABAN 5 MG: 5 TABLET, FILM COATED ORAL at 09:16

## 2022-01-15 NOTE — ASSESSMENT & PLAN NOTE
Lab Results   Component Value Date    HGBA1C 5 6 11/25/2020   hold metformin  ISS and accuchecks   Monitor while on decadron

## 2022-01-15 NOTE — PLAN OF CARE
Problem: MOBILITY - ADULT  Goal: Maintain or return to baseline ADL function  Description: INTERVENTIONS:  -  Assess patient's ability to carry out ADLs; assess patient's baseline for ADL function and identify physical deficits which impact ability to perform ADLs (bathing, care of mouth/teeth, toileting, grooming, dressing, etc )  - Assess/evaluate cause of self-care deficits   - Assess range of motion  - Assess patient's mobility; develop plan if impaired  - Assess patient's need for assistive devices and provide as appropriate  - Encourage maximum independence but intervene and supervise when necessary  - Involve family in performance of ADLs  - Assess for home care needs following discharge   - Consider OT consult to assist with ADL evaluation and planning for discharge  - Provide patient education as appropriate  Outcome: Progressing  Goal: Maintains/Returns to pre admission functional level  Description: INTERVENTIONS:  - Perform BMAT or MOVE assessment daily    - Set and communicate daily mobility goal to care team and patient/family/caregiver  - Collaborate with rehabilitation services on mobility goals if consulted  - Perform Range of Motion  times a day  - Reposition patient every  hours    - Dangle patient  times a day  - Stand patient  times a day  - Ambulate patient  times a day  - Out of bed to chair  times a day   - Out of bed for meals  times a day  - Out of bed for toileting  - Record patient progress and toleration of activity level   Outcome: Progressing     Problem: Potential for Falls  Goal: Patient will remain free of falls  Description: INTERVENTIONS:  - Educate patient/family on patient safety including physical limitations  - Instruct patient to call for assistance with activity   - Consult OT/PT to assist with strengthening/mobility   - Keep Call bell within reach  - Keep bed low and locked with side rails adjusted as appropriate  - Keep care items and personal belongings within reach  - Initiate and maintain comfort rounds  - Make Fall Risk Sign visible to staff  - Offer Toileting every  Hours, in advance of need  - Initiate/Maintain alarm  - Obtain necessary fall risk management equipment:   - Apply yellow socks and bracelet for high fall risk patients  - Consider moving patient to room near nurses station  Outcome: Progressing     Problem: RESPIRATORY - ADULT  Goal: Achieves optimal ventilation and oxygenation  Description: INTERVENTIONS:  - Assess for changes in respiratory status  - Assess for changes in mentation and behavior  - Position to facilitate oxygenation and minimize respiratory effort  - Oxygen administered by appropriate delivery if ordered  - Initiate smoking cessation education as indicated  - Encourage broncho-pulmonary hygiene including cough, deep breathe, Incentive Spirometry  - Assess the need for suctioning and aspirate as needed  - Assess and instruct to report SOB or any respiratory difficulty  - Respiratory Therapy support as indicated  Outcome: Progressing     Problem: MUSCULOSKELETAL - ADULT  Goal: Maintain or return mobility to safest level of function  Description: INTERVENTIONS:  - Assess patient's ability to carry out ADLs; assess patient's baseline for ADL function and identify physical deficits which impact ability to perform ADLs (bathing, care of mouth/teeth, toileting, grooming, dressing, etc )  - Assess/evaluate cause of self-care deficits   - Assess range of motion  - Assess patient's mobility  - Assess patient's need for assistive devices and provide as appropriate  - Encourage maximum independence but intervene and supervise when necessary  - Involve family in performance of ADLs  - Assess for home care needs following discharge   - Consider OT consult to assist with ADL evaluation and planning for discharge  - Provide patient education as appropriate  Outcome: Progressing  Goal: Maintain proper alignment of affected body part  Description: INTERVENTIONS:  - Support, maintain and protect limb and body alignment  - Provide patient/ family with appropriate education  Outcome: Progressing     Problem: CARDIOVASCULAR - ADULT  Goal: Maintains optimal cardiac output and hemodynamic stability  Description: INTERVENTIONS:  - Monitor I/O, vital signs and rhythm  - Monitor for S/S and trends of decreased cardiac output  - Administer and titrate ordered vasoactive medications to optimize hemodynamic stability  - Assess quality of pulses, skin color and temperature  - Assess for signs of decreased coronary artery perfusion  - Instruct patient to report change in severity of symptoms  Outcome: Progressing  Goal: Absence of cardiac dysrhythmias or at baseline rhythm  Description: INTERVENTIONS:  - Continuous cardiac monitoring, vital signs, obtain 12 lead EKG if ordered  - Administer antiarrhythmic and heart rate control medications as ordered  - Monitor electrolytes and administer replacement therapy as ordered  Outcome: Progressing     Problem: PAIN - ADULT  Goal: Verbalizes/displays adequate comfort level or baseline comfort level  Description: Interventions:  - Encourage patient to monitor pain and request assistance  - Assess pain using appropriate pain scale  - Administer analgesics based on type and severity of pain and evaluate response  - Implement non-pharmacological measures as appropriate and evaluate response  - Consider cultural and social influences on pain and pain management  - Notify physician/advanced practitioner if interventions unsuccessful or patient reports new pain  Outcome: Progressing     Problem: INFECTION - ADULT  Goal: Absence or prevention of progression during hospitalization  Description: INTERVENTIONS:  - Assess and monitor for signs and symptoms of infection  - Monitor lab/diagnostic results  - Monitor all insertion sites, i e  indwelling lines, tubes, and drains  - Monitor endotracheal if appropriate and nasal secretions for changes in amount and color  - [place] appropriate cooling/warming therapies per order  - Administer medications as ordered  - Instruct and encourage patient and family to use good hand hygiene technique  - Identify and instruct in appropriate isolation precautions for identified infection/condition  Outcome: Progressing  Goal: Absence of fever/infection during neutropenic period  Description: INTERVENTIONS:  - Monitor WBC    Outcome: Progressing     Problem: SAFETY ADULT  Goal: Maintain or return to baseline ADL function  Description: INTERVENTIONS:  -  Assess patient's ability to carry out ADLs; assess patient's baseline for ADL function and identify physical deficits which impact ability to perform ADLs (bathing, care of mouth/teeth, toileting, grooming, dressing, etc )  - Assess/evaluate cause of self-care deficits   - Assess range of motion  - Assess patient's mobility; develop plan if impaired  - Assess patient's need for assistive devices and provide as appropriate  - Encourage maximum independence but intervene and supervise when necessary  - Involve family in performance of ADLs  - Assess for home care needs following discharge   - Consider OT consult to assist with ADL evaluation and planning for discharge  - Provide patient education as appropriate  Outcome: Progressing  Goal: Maintains/Returns to pre admission functional level  Description: INTERVENTIONS:  - Perform BMAT or MOVE assessment daily    - Set and communicate daily mobility goal to care team and patient/family/caregiver  - Collaborate with rehabilitation services on mobility goals if consulted  - Perform Range of Motion  times a day  - Reposition patient every  hours    - Dangle patient  times a day  - Stand patient  times a day  - Ambulate patient times a day  - Out of bed to chair  times a day   - Out of bed for meals  times a day  - Out of bed for toileting  - Record patient progress and toleration of activity level   Outcome: Progressing  Goal: Patient will remain free of falls  Description: INTERVENTIONS:  - Educate patient/family on patient safety including physical limitations  - Instruct patient to call for assistance with activity   - Consult OT/PT to assist with strengthening/mobility   - Keep Call bell within reach  - Keep bed low and locked with side rails adjusted as appropriate  - Keep care items and personal belongings within reach  - Initiate and maintain comfort rounds  - Make Fall Risk Sign visible to staff  - Offer Toileting every  Hours, in advance of need  - Initiate/Maintain alarm  - Obtain necessary fall risk management equipment:   - Apply yellow socks and bracelet for high fall risk patients  - Consider moving patient to room near nurses station  Outcome: Progressing     Problem: DISCHARGE PLANNING  Goal: Discharge to home or other facility with appropriate resources  Description: INTERVENTIONS:  - Identify barriers to discharge w/patient and caregiver  - Arrange for needed discharge resources and transportation as appropriate  - Identify discharge learning needs (meds, wound care, etc )  - Arrange for interpretive services to assist at discharge as needed  - Refer to Case Management Department for coordinating discharge planning if the patient needs post-hospital services based on physician/advanced practitioner order or complex needs related to functional status, cognitive ability, or social support system  Outcome: Progressing     Problem: Knowledge Deficit  Goal: Patient/family/caregiver demonstrates understanding of disease process, treatment plan, medications, and discharge instructions  Description: Complete learning assessment and assess knowledge base    Interventions:  - Provide teaching at level of understanding  - Provide teaching via preferred learning methods  Outcome: Progressing

## 2022-01-15 NOTE — ASSESSMENT & PLAN NOTE
Wt Readings from Last 3 Encounters:   01/14/22 96 kg (211 lb 10 3 oz)   08/26/21 99 8 kg (220 lb)   07/29/21 99 8 kg (220 lb)   ·   · LVEF of 25% in 1/21  · Continue beta-blocker  · Monitor I/O, daily weights  · Presently diuretics on hold given elevated creatinines  · Resume in 24-48 hours

## 2022-01-15 NOTE — PROGRESS NOTES
[facility]  Progress Note Ana Ambrosio 1950, 70 y o  male MRN: [id number]  Unit/Bed#: S -01 Encounter: [id number]  Primary Care Provider: Kristina Nunez MD   Date and time admitted to hospital: 1/14/2022  3:21 PM    * Acute hypoxemic respiratory failure due to COVID-19 [facility])  Assessment & Plan  Due to COVID-19 infection  Placed on moderate treatment protocol  Continue remdesivir, Decadron, baricitinib  Wean supplemental oxygen as tolerated to keep O2 sats more than 92-94%  Supportive care    Hyponatremia  Assessment & Plan  · Present on admission  · Improving  · Monitor closely    Elevated serum creatinine  Assessment & Plan  · Present on admission  · Improving  · Monitor closely  · Monitor postvoid residuals    Type 2 diabetes mellitus ([facility] [address] )  Assessment & Plan    Lab Results   Component Value Date    HGBA1C 5 6 11/25/2020     Metformin while inpatient  Monitor Accu-Cheks  Avoid hypoglycemia  Hypoglycemia protocol in place        Chronic combined systolic and diastolic congestive heart failure (HCC)  Assessment & Plan  Wt Readings from Last 3 Encounters:   01/14/22 96 kg (211 lb 10 3 oz)   08/26/21 99 8 kg (220 lb)   07/29/21 99 8 kg (220 lb)   ·   · LVEF of 25% in 1/21  · Continue beta-blocker  · Monitor I/O, daily weights  · Presently diuretics on hold given elevated creatinines  · Resume in 24-48 hours              VTE Pharmacologic Prophylaxis:   High Risk (Score >/= 5) - Pharmacological DVT Prophylaxis Ordered: apixaban (Eliquis)  Sequential Compression Devices Ordered  Patient Centered Rounds: I performed bedside rounds with nursing staff today  Discussions with Specialists or Other Care Team Provider:     Education and Discussions with Family / Patient: patient, updated spouse Keenan Boeck   Time Spent for Care: 30 minutes  More than 50% of total time spent on counseling and coordination of care as described above      Current Length of Stay: 1 day(s)  Current Patient Status: Inpatient   Certification Statement: The patient will continue to require additional inpatient hospital stay due to as outlined  Discharge Plan: Awaiting clinical and symptomatic improvement    Code Status: Level 1 - Full Code    Subjective:     Comfortably in bed  Reports fatigue and shortness of breath on minimal exertion  Reports feeling tired and fatigued  Poor appetite  History chart labs medications reviewed  Encouraged incentive spirometry    Objective:     Vitals:   Temp (24hrs), Av °F (36 7 °C), Min:97 7 °F (36 5 °C), Max:98 2 °F (36 8 °C)    Temp:  [97 7 °F (36 5 °C)-98 2 °F (36 8 °C)] 97 7 °F (36 5 °C)  HR:  [] 80  Resp:  [16-20] 18  BP: (114-131)/(57-71) 118/71  SpO2:  [88 %-96 %] 93 %  Body mass index is 31 25 kg/m²  Input and Output Summary (last 24 hours): Intake/Output Summary (Last 24 hours) at 1/15/2022 1456  Last data filed at 2022 1721  Gross per 24 hour   Intake 1000 ml   Output --   Net 1000 ml       Physical Exam:   Physical Exam     Comfortably in bed  Obese  Short thick neck  Lungs diminished breath sounds bilaterally  Heart sounds S1-S2 noted  Abdomen soft  Awake obeys simple commands  No pedal edema  No rash    Additional Data:     Labs:  Results from last 7 days   Lab Units 01/15/22  0622 22  1617 22  1617   WBC Thousand/uL 2 00*   < > 5 06   HEMOGLOBIN g/dL 13 6   < > 13 9   HEMATOCRIT % 40 1   < > 40 6   PLATELETS Thousands/uL 188   < > 177   NEUTROS PCT %  --   --  78*   LYMPHS PCT %  --   --  14   LYMPHO PCT % 23  --   --    MONOS PCT %  --   --  7   MONO PCT % 10  --   --    EOS PCT % 0  --  0    < > = values in this interval not displayed       Results from last 7 days   Lab Units 01/15/22  0622   SODIUM mmol/L 134*   POTASSIUM mmol/L 4 3   CHLORIDE mmol/L 101   CO2 mmol/L 24   BUN mg/dL 26*   CREATININE mg/dL 1 04   ANION GAP mmol/L 9   CALCIUM mg/dL 8 7   ALBUMIN g/dL 2 8*   TOTAL BILIRUBIN mg/dL 0 36   ALK PHOS U/L 47   ALT U/L 26 AST U/L 38   GLUCOSE RANDOM mg/dL 152*     Results from last 7 days   Lab Units 01/14/22  1617   INR  1 07     Results from last 7 days   Lab Units 01/15/22  1227 01/15/22  0753 01/14/22  2111   POC GLUCOSE mg/dl 269* 158* 119         Results from last 7 days   Lab Units 01/15/22  0622 01/14/22  1617   LACTIC ACID mmol/L  --  1 4   PROCALCITONIN ng/ml 0 10  --        Lines/Drains:  Invasive Devices  Report    Peripheral Intravenous Line            Peripheral IV 01/14/22 Left Antecubital <1 day                      Imaging: Reviewed radiology reports from this admission including: chest CT scan    Recent Cultures (last 7 days):   Results from last 7 days   Lab Units 01/14/22  1637 01/14/22  1617   BLOOD CULTURE  Received in Microbiology Lab  Culture in Progress  Received in Microbiology Lab  Culture in Progress  Last 24 Hours Medication List:   Current Facility-Administered Medications   Medication Dose Route Frequency Provider Last Rate    apixaban  5 mg Oral BID Pili Clement MD      aspirin  81 mg Oral Daily Pili Clement MD      atorvastatin  40 mg Oral HS Pili Clement MD      Baricitinib  2 mg Oral Q24H Pili Clement MD      dexamethasone  6 mg Intravenous Q24H Pili Clement MD      insulin lispro  1-6 Units Subcutaneous TID [facility] [place] Pili Clement MD      insulin lispro  1-6 Units Subcutaneous HS Pili Clement MD      metoprolol succinate  50 mg Oral Daily Pili Clement MD      ondansetron  4 mg Intravenous Q6H PRN Pili Clement MD      oxyCODONE  5 mg Oral Q6H PRN Pili Clement MD      remdesivir  100 mg Intravenous Q24H Pili Clement MD          Today, Patient Was Seen By: Arlyn Koyanagi, MD    **Please Note: This note may have been constructed using a voice recognition system  **

## 2022-01-15 NOTE — ASSESSMENT & PLAN NOTE
· Cr: 1 39; bsCr: 1 1;  Suspect d/t poor oral intake   · Hold diuretics and ACEi    · Give gentle IVF   · Follow-up am

## 2022-01-15 NOTE — ASSESSMENT & PLAN NOTE
· POA with increased weakness, SOB, poor oral intake  · Unvaccinated   · Admit under moderate pathway  · Decadron  · remdesivir  · baracitinib  · Cont eliquis   · Wean O2 as able

## 2022-01-15 NOTE — PLAN OF CARE
Problem: RESPIRATORY - ADULT  Goal: Achieves optimal ventilation and oxygenation  Description: INTERVENTIONS:  - Assess for changes in respiratory status  - Assess for changes in mentation and behavior  - Position to facilitate oxygenation and minimize respiratory effort  - Oxygen administered by appropriate delivery if ordered  - Initiate smoking cessation education as indicated  - Encourage broncho-pulmonary hygiene including cough, deep breathe, Incentive Spirometry  - Assess the need for suctioning and aspirate as needed  - Assess and instruct to report SOB or any respiratory difficulty  - Respiratory Therapy support as indicated  1/15/2022 0145 by Jaren Valdes RN  Outcome: Progressing  1/15/2022 0145 by Jaren Valdes RN  Outcome: Progressing  1/15/2022 0145 by Jaren Valdes RN  Outcome: Progressing     Problem: MOBILITY - ADULT  Goal: Maintain or return to baseline ADL function  Description: INTERVENTIONS:  -  Assess patient's ability to carry out ADLs; assess patient's baseline for ADL function and identify physical deficits which impact ability to perform ADLs (bathing, care of mouth/teeth, toileting, grooming, dressing, etc )  - Assess/evaluate cause of self-care deficits   - Assess range of motion  - Assess patient's mobility; develop plan if impaired  - Assess patient's need for assistive devices and provide as appropriate  - Encourage maximum independence but intervene and supervise when necessary  - Involve family in performance of ADLs  - Assess for home care needs following discharge   - Consider OT consult to assist with ADL evaluation and planning for discharge  - Provide patient education as appropriate  1/15/2022 0145 by Jaren Valdes RN  Outcome: Progressing  1/15/2022 0145 by Jaren Valdes RN  Outcome: Progressing  1/15/2022 0145 by Jaren Valdes RN  Outcome: Progressing     Problem: PAIN - ADULT  Goal: Verbalizes/displays adequate comfort level or baseline comfort level  Description: Interventions:  - Encourage patient to monitor pain and request assistance  - Assess pain using appropriate pain scale  - Administer analgesics based on type and severity of pain and evaluate response  - Implement non-pharmacological measures as appropriate and evaluate response  - Consider cultural and social influences on pain and pain management  - Notify physician/advanced practitioner if interventions unsuccessful or patient reports new pain  Outcome: Progressing

## 2022-01-15 NOTE — H&P
Maximilian  H&P- Verl Coad 1950, 70 y o  male MRN: [id number]  Unit/Bed#: ED 08 Encounter: [id number]  Primary Care Provider: Annelise Knight MD   Date and time admitted to hospital: 1/14/2022  3:21 PM    * Acute hypoxemic respiratory failure due to COVID-19 [facility])  Assessment & Plan  · POA with increased weakness, SOB, poor oral intake  · Unvaccinated   · Admit under moderate pathway  · Decadron  · remdesivir  · baracitinib  · Cont eliquis   · Wean O2 as able     Chronic combined systolic and diastolic congestive heart failure (HCC)  Assessment & Plan  Wt Readings from Last 3 Encounters:   01/14/22 96 kg (211 lb 10 3 oz)   08/26/21 99 8 kg (220 lb)   07/29/21 99 8 kg (220 lb)   · received 1L of IVF in the ED   · LVEF of 25% in 1/21  · Hold diuretics, ACEi for now given elevated Cr  · Cautious volume resuscitation   · Resume diuretics when able           Type 2 diabetes mellitus [facility])  Assessment & Plan    Lab Results   Component Value Date    HGBA1C 5 6 11/25/2020   hold metformin  ISS and accuchecks  Monitor while on decadron     Elevated serum creatinine  Assessment & Plan  · Cr: 1 39; bsCr: 1 1;  Suspect d/t poor oral intake   · Hold diuretics and ACEi  · Give gentle IVF   · Follow-up am     Hyponatremia  Assessment & Plan  · POA with Na of 132; likely hypovolemic  · Gentle IVF overnight  · BMP in the am     VTE Prophylaxis: Apixaban (Eliquis)  / sequential compression device   Code Status: FULL   POLST: POLST form is not discussed and not completed at this time  Discussion with family:     Anticipated Length of Stay:  Patient will be admitted on an Inpatient basis with an anticipated length of stay of  > 2 midnights  Justification for Hospital Stay:  [place] with hypoxia     Total Time for Visit, including Counseling / Coordination of Care: 70 minutes  Greater than 50% of this total time spent on direct patient counseling and coordination of care      Chief Complaint: SOB, weakness     History of Present Illness:    Uziel Figueroa is a 70 y o  male history of chronic combined CHF, prior CVA, type 2 diabetes who presents with generalized weakness and worsening shortness of breath in the setting of COVID-19  Patient reports daughter was 1st infected several weeks ago but quickly recovered  The rest of his family became ill subsequently  Feels the symptoms have been rapidly progressing over the last few days  He notes worsening shortness of breath had taken a pulse ox at home in the 70s  Reports subjective fevers and chills, body aches  Poor oral intake  Patient had not been taking some of his medications prior to coming to the hospital   He does not believe he was taking his diuretics for at least a few days  Review of Systems:    Review of Systems   Constitutional: Negative for chills and fatigue  HENT: Negative  Respiratory: Positive for cough and shortness of breath  Cardiovascular: Negative  Negative for chest pain and palpitations  Gastrointestinal: Negative  All other systems reviewed and are negative  Past Medical and Surgical History:     Past Medical History:   Diagnosis Date    Atypical chest pain     Back pain     [facility]     [facility]     Lung disorder     Migraine        Past Surgical History:   Procedure Laterality Date    ROTATOR CUFF REPAIR Bilateral     TONSILLECTOMY      TRIGGER FINGER RELEASE         Meds/Allergies:    Prior to Admission medications    Medication Sig Start Date End Date Taking? Authorizing Provider   apixaban (ELIQUIS) 5 mg Take one tablet ( 5 mg) every 12 hours 12/21/20  Yes EVERT Nugent   albuterol (2 5 mg/3 mL) 0 083 % nebulizer solution Take 2 5 mg by nebulization every 6 (six) hours as needed for wheezing    Patient not taking: Reported on 7/29/2021    Historical Provider, MD   Ascorbic Acid (vitamin C) 1000 MG tablet Take 1,000 mg by mouth daily Historical Provider, MD   aspirin 81 mg chewable tablet Chew 81 mg daily    Historical Provider, MD   Cinnamon 500 MG capsule Take 500 mg by mouth daily    Historical Provider, MD   co-enzyme Q-10 50 MG capsule Take 50 mg by mouth daily    Historical Provider, MD   Diclofenac Sodium (VOLTAREN) 1 % Apply 2 g topically 3 (three) times a day as needed (Pain)  Patient not taking: Reported on 7/29/2021 3/24/21   Amanda Zavaleta DO   finasteride (PROSCAR) 5 mg tablet Take 5 mg by mouth daily  Patient not taking: Reported on 7/29/2021    Historical Provider, MD   magnesium oxide (MAG-OX) 400 mg Take 400 mg by mouth 2 (two) times a day    Historical Provider, MD   metFORMIN (GLUCOPHAGE) 500 mg tablet Take 500 mg by mouth daily    Historical Provider, MD   metoprolol succinate (TOPROL-XL) 50 mg 24 hr tablet Take 1 tablet (50 mg total) by mouth daily 1/27/21   EVERT Morton   oxyCODONE (ROXICODONE) 5 mg immediate release tablet Take 1 tablet (5 mg total) by mouth every 6 (six) hours as needed for moderate painMax Daily Amount: 20 mg 8/26/21   EVERT Walker   potassium chloride (K-DUR,KLOR-CON) 20 mEq tablet Take 1 tablet (20 mEq total) by mouth daily 3/10/21   EVERT Morton   ramipril (ALTACE) 1 25 mg capsule Take 1 25 mg by mouth daily  Historical Provider, MD   Red Yeast Rice Extract (RED YEAST RICE PO) Take 2 tablets by mouth 2 (two) times a day    Historical Provider, MD   rizatriptan (MAXALT) 10 MG tablet Take 10 mg by mouth once as needed for migraine   May repeat in 2 hours if needed    Historical Provider, MD   spironolactone (ALDACTONE) 25 mg tablet Take 12 5 mg by mouth 2 (two) times a day 1/4/21   Historical Provider, MD   torsemide (DEMADEX) 20 mg tablet Take 1 tablet (20 mg total) by mouth daily 3/15/21   EVERT Morton   vitamin B-12 (VITAMIN B-12) 1,000 mcg tablet Take 5,000 mcg by mouth daily    Historical Provider, MD   zinc gluconate 50 mg tablet Take 50 mg by mouth daily Historical Provider, MD     I have reviewed home medications using allscripts  Allergies: Allergies   Allergen Reactions    Poultry Meal - Food Allergy        Social History:     Marital Status: /Civil Union   Occupation:   Patient Pre-hospital Living Situation:    Patient Pre-hospital Level of Mobility:   Patient Pre-hospital Diet Restrictions:   Substance Use History:   Social History     Substance and Sexual Activity   Alcohol Use Yes     Social History     Tobacco Use   Smoking Status Former Smoker    Packs/day: 1 00    Years: 25 00    Pack years: 25 00    Types: Cigarettes, Cigars    Quit date:     Years since quittin 0   Smokeless Tobacco Never Used     Social History     Substance and Sexual Activity   Drug Use Not Currently    Comment: CBD Oil pt stated no longer using       Family History:    History reviewed  No pertinent family history  Physical Exam:     Vitals:   Blood Pressure: 118/67 (22)  Pulse: 96 (22)  Temperature: 98 2 °F (36 8 °C) (22)  Temp Source: Oral (22)  Respirations: 16 (22)  Weight - Scale: 96 kg (211 lb 10 3 oz) (22)  SpO2: 92 % (22)    Physical Exam  Vitals and nursing note reviewed  Constitutional:       General: He is not in acute distress  Appearance: Normal appearance  He is ill-appearing and diaphoretic  He is not toxic-appearing  HENT:      Head: Normocephalic and atraumatic  Cardiovascular:      Rate and Rhythm: Normal rate and regular rhythm  Heart sounds: No murmur heard  Pulmonary:      Effort: Pulmonary effort is normal  No respiratory distress  Breath sounds: Normal breath sounds  No wheezing or rales  Abdominal:      General: Abdomen is flat  There is no distension  Palpations: Abdomen is soft  Tenderness: There is no abdominal tenderness  Neurological:      General: No focal deficit present        Mental Status: He is alert and oriented to person, place, and time  Mental status is at baseline  Psychiatric:         Mood and Affect: Mood normal          Behavior: Behavior normal          Additional Data:     Lab Results: I have personally reviewed pertinent reports  Results from last 7 days   Lab Units 01/14/22  1617   WBC Thousand/uL 5 06   HEMOGLOBIN g/dL 13 9   HEMATOCRIT % 40 6   PLATELETS Thousands/uL 177   NEUTROS PCT % 78*   LYMPHS PCT % 14   MONOS PCT % 7   EOS PCT % 0     Results from last 7 days   Lab Units 01/14/22  1617   SODIUM mmol/L 132*   POTASSIUM mmol/L 4 1   CHLORIDE mmol/L 97*   CO2 mmol/L 25   BUN mg/dL 31*   CREATININE mg/dL 1 39*   ANION GAP mmol/L 10   CALCIUM mg/dL 9 0   ALBUMIN g/dL 3 2*   TOTAL BILIRUBIN mg/dL 0 57   ALK PHOS U/L 51   ALT U/L 30   AST U/L 43   GLUCOSE RANDOM mg/dL 135     Results from last 7 days   Lab Units 01/14/22  1617   INR  1 07             Results from last 7 days   Lab Units 01/14/22  1617   LACTIC ACID mmol/L 1 4       Imaging: I have personally reviewed pertinent reports  CTA ED chest PE Study   Final Result by Billy Yañez MD (01/14 1853)         1  Findings consistent with COVID-19 pneumonia  2   No evidence of acute pulmonary embolus, thoracic aortic aneurysm or dissection  Workstation performed: [id number]         XR chest 1 view portable    (Results Pending)       EKG, Pathology, and Other Studies Reviewed on Admission:   · EKG: sinus tach 105     Allscripts / Epic Records Reviewed: Yes     ** Please Note: This note has been constructed using a voice recognition system   **

## 2022-01-15 NOTE — ASSESSMENT & PLAN NOTE
Lab Results   Component Value Date    HGBA1C 5 6 11/25/2020     Metformin while inpatient  Monitor Accu-Cheks  Avoid hypoglycemia  Hypoglycemia protocol in place

## 2022-01-15 NOTE — ASSESSMENT & PLAN NOTE
Due to COVID-19 infection  Placed on moderate treatment protocol  Continue remdesivir, Decadron, baricitinib  Wean supplemental oxygen as tolerated to keep O2 sats more than 92-94%  Supportive care

## 2022-01-15 NOTE — ASSESSMENT & PLAN NOTE
Wt Readings from Last 3 Encounters:   01/14/22 96 kg (211 lb 10 3 oz)   08/26/21 99 8 kg (220 lb)   07/29/21 99 8 kg (220 lb)   · received 1L of IVF in the ED   · LVEF of 25% in 1/21  · Hold diuretics, ACEi for now given elevated Cr  · Cautious volume resuscitation   · Resume diuretics when able

## 2022-01-16 VITALS
DIASTOLIC BLOOD PRESSURE: 67 MMHG | WEIGHT: 211.64 LBS | OXYGEN SATURATION: 93 % | SYSTOLIC BLOOD PRESSURE: 128 MMHG | HEART RATE: 59 BPM | RESPIRATION RATE: 16 BRPM | TEMPERATURE: 97.7 F | BODY MASS INDEX: 31.25 KG/M2

## 2022-01-16 PROBLEM — R79.89 ELEVATED SERUM CREATININE: Status: RESOLVED | Noted: 2022-01-14 | Resolved: 2022-01-16

## 2022-01-16 PROBLEM — E87.1 HYPONATREMIA: Status: RESOLVED | Noted: 2022-01-14 | Resolved: 2022-01-16

## 2022-01-16 LAB
ANION GAP SERPL CALCULATED.3IONS-SCNC: 8 MMOL/L (ref 4–13)
BASOPHILS # BLD MANUAL: 0 THOUSAND/UL (ref 0–0.1)
BASOPHILS NFR MAR MANUAL: 0 % (ref 0–1)
BUN SERPL-MCNC: 28 MG/DL (ref 5–25)
CALCIUM SERPL-MCNC: 8.8 MG/DL (ref 8.3–10.1)
CHLORIDE SERPL-SCNC: 104 MMOL/L (ref 100–108)
CO2 SERPL-SCNC: 25 MMOL/L (ref 21–32)
CREAT SERPL-MCNC: 1.06 MG/DL (ref 0.6–1.3)
DME PARACHUTE DELIVERY DATE ACTUAL: NORMAL
DME PARACHUTE DELIVERY DATE EXPECTED: NORMAL
DME PARACHUTE DELIVERY DATE REQUESTED: NORMAL
DME PARACHUTE DELIVERY NOTE: NORMAL
DME PARACHUTE ITEM DESCRIPTION: NORMAL
DME PARACHUTE ORDER STATUS: NORMAL
DME PARACHUTE SUPPLIER NAME: NORMAL
DME PARACHUTE SUPPLIER PHONE: NORMAL
EOSINOPHIL # BLD MANUAL: 0.07 THOUSAND/UL (ref 0–0.4)
EOSINOPHIL NFR BLD MANUAL: 1 % (ref 0–6)
ERYTHROCYTE [DISTWIDTH] IN BLOOD BY AUTOMATED COUNT: 13.7 % (ref 11.6–15.1)
GFR SERPL CREATININE-BSD FRML MDRD: 70 ML/MIN/1.73SQ M
GLUCOSE SERPL-MCNC: 174 MG/DL (ref 65–140)
GLUCOSE SERPL-MCNC: 185 MG/DL (ref 65–140)
HCT VFR BLD AUTO: 40.9 % (ref 36.5–49.3)
HGB BLD-MCNC: 13.7 G/DL (ref 12–17)
LYMPHOCYTES # BLD AUTO: 0.56 THOUSAND/UL (ref 0.6–4.47)
LYMPHOCYTES # BLD AUTO: 8 % (ref 14–44)
MCH RBC QN AUTO: 29.6 PG (ref 26.8–34.3)
MCHC RBC AUTO-ENTMCNC: 33.5 G/DL (ref 31.4–37.4)
MCV RBC AUTO: 88 FL (ref 82–98)
MONOCYTES # BLD AUTO: 0.63 THOUSAND/UL (ref 0–1.22)
MONOCYTES NFR BLD: 9 % (ref 4–12)
NEUTROPHILS # BLD MANUAL: 5.53 THOUSAND/UL (ref 1.85–7.62)
NEUTS BAND NFR BLD MANUAL: 2 % (ref 0–8)
NEUTS SEG NFR BLD AUTO: 77 % (ref 43–75)
PLATELET # BLD AUTO: 229 THOUSANDS/UL (ref 149–390)
PLATELET BLD QL SMEAR: ADEQUATE
PMV BLD AUTO: 9.9 FL (ref 8.9–12.7)
POTASSIUM SERPL-SCNC: 4.3 MMOL/L (ref 3.5–5.3)
PROCALCITONIN SERPL-MCNC: 0.08 NG/ML
RBC # BLD AUTO: 4.63 MILLION/UL (ref 3.88–5.62)
RBC MORPH BLD: NORMAL
SODIUM SERPL-SCNC: 137 MMOL/L (ref 136–145)
VARIANT LYMPHS # BLD AUTO: 3 %
WBC # BLD AUTO: 7 THOUSAND/UL (ref 4.31–10.16)

## 2022-01-16 PROCEDURE — 85027 COMPLETE CBC AUTOMATED: CPT | Performed by: INTERNAL MEDICINE

## 2022-01-16 PROCEDURE — 94761 N-INVAS EAR/PLS OXIMETRY MLT: CPT

## 2022-01-16 PROCEDURE — 84145 PROCALCITONIN (PCT): CPT | Performed by: HOSPITALIST

## 2022-01-16 PROCEDURE — 82948 REAGENT STRIP/BLOOD GLUCOSE: CPT

## 2022-01-16 PROCEDURE — 85007 BL SMEAR W/DIFF WBC COUNT: CPT | Performed by: INTERNAL MEDICINE

## 2022-01-16 PROCEDURE — 80048 BASIC METABOLIC PNL TOTAL CA: CPT | Performed by: INTERNAL MEDICINE

## 2022-01-16 PROCEDURE — 99239 HOSP IP/OBS DSCHRG MGMT >30: CPT | Performed by: PHYSICIAN ASSISTANT

## 2022-01-16 RX ORDER — DEXAMETHASONE 2 MG/1
TABLET ORAL
Qty: 9 TABLET | Refills: 0 | Status: SHIPPED | OUTPATIENT
Start: 2022-01-17 | End: 2022-01-23

## 2022-01-16 RX ADMIN — INSULIN LISPRO 1 UNITS: 100 INJECTION, SOLUTION INTRAVENOUS; SUBCUTANEOUS at 09:06

## 2022-01-16 RX ADMIN — METOPROLOL SUCCINATE 50 MG: 50 TABLET, EXTENDED RELEASE ORAL at 09:06

## 2022-01-16 RX ADMIN — APIXABAN 5 MG: 5 TABLET, FILM COATED ORAL at 09:06

## 2022-01-16 RX ADMIN — ASPIRIN 81 MG CHEWABLE TABLET 81 MG: 81 TABLET CHEWABLE at 09:06

## 2022-01-16 NOTE — RESPIRATORY THERAPY NOTE
Home Oxygen Qualifying Test       Patient name: Xavier Fregoso        : 1950   Date of Test:  2022  Diagnosis:      Home Oxygen Test:    **Medicare Guidelines require item(s) 1-5 on all ambulatory patients or 1 and 2 on non-ambulatory patients  1   Baseline SPO2 on Room Air at rest 87 %  2   SPO2 during exercise on Room Air 86 %  During exercise monitor SpO2  If SPO2 increases >=89% with ambulation do not add supplemental             oxygen  If <= 88% on room air add O2 via NC and titrate patient  Patient must be ambulated with O2 and titrated to > 88% with exertion  3   SPO2 on Oxygen at rest 91-93 % 2 lpm     4   SPO2 during exercise on Oxygen  91% a liter flow of 2 lpm     5   Exercise performed:          walking          [x]  Supplemental Home Oxygen is indicated  []  Client does not qualify for home oxygen  Respiratory Additional Notes- Patient qualifies for 2L home O2      Manuel Frazier RT

## 2022-01-16 NOTE — DISCHARGE SUMMARY
[facility]  Discharge- [facility] 1950, 70 y o  male MRN: [id number]  Unit/Bed#: S -01 Encounter: [id number]  Primary Care Provider: Barbara Diaz MD   Date and time admitted to hospital: 1/14/2022  3:21 PM    * Acute hypoxemic respiratory failure due to COVID-19 [facility])  Assessment & Plan  · Due to COVID-19 infection  · Placed on moderate treatment protocol  · Received 2 days of remdesivir, Decadron, baricinitib  · Will discharge with dexamethasone taper  · Home O2 eval prior to discharge  · 2 LPM    Type 2 diabetes mellitus [facility])  Assessment & Plan    Lab Results   Component Value Date    HGBA1C 5 6 11/25/2020     · Metformin  · Monitor Accu-Cheks        Chronic combined systolic and diastolic congestive heart failure (HCC)  Assessment & Plan  Wt Readings from Last 3 Encounters:   01/14/22 96 kg (211 lb 10 3 oz)   08/26/21 99 8 kg (220 lb)   07/29/21 99 8 kg (220 lb)     · LVEF of 25% in 1/21  · Continue beta-blocker  · Resume diuretics          Hyponatremia-resolved as of 1/16/2022  Assessment & Plan  · Present on admission  · Resolved      Elevated serum creatinine-resolved as of 1/16/2022  Assessment & Plan  · Present on admission  · Improved  · Monitor closely  · outpt follow up    Discharging Physician / Practitioner: Cristela Moe PA-C  PCP: Barbara Diaz MD  Admission Date:   Admission Orders (From admission, onward)     Ordered        01/14/22 1915  Inpatient Admission  Once                      Discharge Date: 01/16/22    Medical Problems             Resolved Problems  Date Reviewed: 1/16/2022          Resolved    Elevated serum creatinine 1/16/2022     Resolved by  Marlyn Vaughn PA-C    Hyponatremia 1/16/2022     Resolved by  Marlyn Vaughn PA-C                Consultations During Hospital Stay:  · none    Procedures Performed:   · none    Significant Findings / Test Results:   · As above    Incidental Findings:   · None      Test Results Pending at Discharge (will require follow up): · None      Outpatient Tests Requested:  · None     Complications:  None     Reason for Admission: [address]oria [address] Dr Course:     Ibeth Burnett is a 70 y o  male patient who originally presented to the hospital on 1/14/2022 due to respiratory failure secondary to COVID-19  Patient was treated on the moderate pathway receiving 2 days of Decadron, remdesivir, baricitinib  Clinically improved his oxygen was weaned down to 2 L upon discharge  Home oxygen evaluation completed and he was discharged with 2 L and instruction to follow-up with his PCP for further weaning of his home oxygen  Please see above list of diagnoses and related plan for additional information  Condition at Discharge: stable     Discharge Day Visit / Exam:     Subjective:  No overnight events, weaned to 2 LPM this AM  Home O2 eval completed and patient discharged in stable condition  Vitals: Blood Pressure: 128/67 (01/16/22 0848)  Pulse: 59 (01/16/22 0848)  Temperature: 97 7 °F (36 5 °C) (01/16/22 0848)  Temp Source: Oral (01/16/22 0848)  Respirations: 16 (01/16/22 0848)  Weight - Scale: 96 kg (211 lb 10 3 oz) (01/14/22 1517)  SpO2: 93 % (01/16/22 0848)  Exam:   Physical Exam  Vitals and nursing note reviewed  Constitutional:       General: He is not in acute distress  Appearance: Normal appearance  Interventions: Nasal cannula in place  HENT:      Head: Normocephalic  Mouth/Throat:      Mouth: Mucous membranes are moist    Eyes:      Pupils: Pupils are equal, round, and reactive to light  Cardiovascular:      Rate and Rhythm: Normal rate and regular rhythm  Heart sounds: No murmur heard  Pulmonary:      Effort: Pulmonary effort is normal  No respiratory distress  Breath sounds: Normal breath sounds  No wheezing, rhonchi or rales  Abdominal:      General: Bowel sounds are normal  There is no distension  Palpations: Abdomen is soft  Tenderness:  There is no abdominal tenderness  There is no guarding  Musculoskeletal:         General: No deformity  Cervical back: Normal range of motion  Right lower leg: No edema  Left lower leg: No edema  Skin:     Capillary Refill: Capillary refill takes less than 2 seconds  Neurological:      General: No focal deficit present  Mental Status: He is alert and oriented to person, place, and time  Mental status is at baseline  Discussion with Family: called wife    Discharge instructions/Information to patient and family:   See after visit summary for information provided to patient and family  Provisions for Follow-Up Care:  See after visit summary for information related to follow-up care and any pertinent home health orders  Disposition:     Home    For Discharges to [facility] SNF:   · Not Applicable to this Patient - Not Applicable to this Patient    Planned Readmission: none     Discharge Statement:  I spent 45 minutes discharging the patient  This time was spent on the day of discharge  I had direct contact with the patient on the day of discharge  Greater than 50% of the total time was spent examining patient, answering all patient questions, arranging and discussing plan of care with patient as well as directly providing post-discharge instructions  Additional time then spent on discharge activities  Discharge Medications:  See after visit summary for reconciled discharge medications provided to patient and family        ** Please Note: This note has been constructed using a voice recognition system **

## 2022-01-16 NOTE — ASSESSMENT & PLAN NOTE
· Due to COVID-19 infection  · Placed on moderate treatment protocol  · Received 2 days of remdesivir, Decadron, baricinitib    · Will discharge with dexamethasone taper  · Home O2 eval prior to discharge  · 2 LPM

## 2022-01-16 NOTE — DISCHARGE INSTR - AVS FIRST PAGE
Dear Aly Goff,     It was our pleasure to care for you here at [facility], [facility]  It is our hope that we were always able to exceed the expected standards for your care during your stay  You were hospitalized due to COVID  You were cared for on the 3rd floor by Lowell Henriquez PA-C under the service of Cecy Grimes MD with the [facility] who covers for your primary care physician (PCP), Kathi Young MD, while you were hospitalized  If you have any questions or concerns related to this hospitalization, you may contact us at [phone]  For follow up as well as any medication refills, we recommend that you follow up with your primary care physician  A registered nurse will reach out to you by phone within a few days after your discharge to answer any additional questions that you may have after going home  However, at this time we provide for you here, the most important instructions / recommendations at discharge:     Notable Medication Adjustments -   Please take dexamethasone starting tomorrow in tapering doses starting with 4 mg for 3 days, then after that take 2 mg for 3 days until complete  Testing Required after Discharge -   None  Important follow up information -   As we discussed please follow-up with your primary care provider in the next month  Other Instructions -   Return to the emergency room if you have worsening shortness of breath, your oxygen levels were low at home, were few developed chest pain, leg swelling  Please review this entire after visit summary as additional general instructions including medication list, appointments, activity, diet, any pertinent wound care, and other additional recommendations from your care team that may be provided for you        Sincerely,     Lowell Henriquez PA-C

## 2022-01-16 NOTE — ASSESSMENT & PLAN NOTE
Wt Readings from Last 3 Encounters:   01/14/22 96 kg (211 lb 10 3 oz)   08/26/21 99 8 kg (220 lb)   07/29/21 99 8 kg (220 lb)     · LVEF of 25% in 1/21  · Continue beta-blocker  · Resume diuretics

## 2022-01-16 NOTE — PLAN OF CARE
Problem: MOBILITY - ADULT  Goal: Maintain or return to baseline ADL function  Description: INTERVENTIONS:  -  Assess patient's ability to carry out ADLs; assess patient's baseline for ADL function and identify physical deficits which impact ability to perform ADLs (bathing, care of mouth/teeth, toileting, grooming, dressing, etc )  - Assess/evaluate cause of self-care deficits   - Assess range of motion  - Assess patient's mobility; develop plan if impaired  - Assess patient's need for assistive devices and provide as appropriate  - Encourage maximum independence but intervene and supervise when necessary  - Involve family in performance of ADLs  - Assess for home care needs following discharge   - Consider OT consult to assist with ADL evaluation and planning for discharge  - Provide patient education as appropriate  Outcome: Progressing  Goal: Maintains/Returns to pre admission functional level  Description: INTERVENTIONS:  - Perform BMAT or MOVE assessment daily    - Set and communicate daily mobility goal to care team and patient/family/caregiver     - Collaborate with rehabilitation services on mobility goals if consulted  - Out of bed for toileting  - Record patient progress and toleration of activity level   Outcome: Progressing     Problem: Potential for Falls  Goal: Patient will remain free of falls  Description: INTERVENTIONS:  - Educate patient/family on patient safety including physical limitations  - Instruct patient to call for assistance with activity   - Consult OT/PT to assist with strengthening/mobility   - Keep Call bell within reach  - Keep bed low and locked with side rails adjusted as appropriate  - Keep care items and personal belongings within reach  - Initiate and maintain comfort rounds  - Make Fall Risk Sign visible to staff  - Apply yellow socks and bracelet for high fall risk patients  - Consider moving patient to room near nurses station  Outcome: Progressing     Problem: RESPIRATORY - ADULT  Goal: Achieves optimal ventilation and oxygenation  Description: INTERVENTIONS:  - Assess for changes in respiratory status  - Assess for changes in mentation and behavior  - Position to facilitate oxygenation and minimize respiratory effort  - Oxygen administered by appropriate delivery if ordered  - Initiate smoking cessation education as indicated  - Encourage broncho-pulmonary hygiene including cough, deep breathe, Incentive Spirometry  - Assess the need for suctioning and aspirate as needed  - Assess and instruct to report SOB or any respiratory difficulty  - Respiratory Therapy support as indicated  Outcome: Progressing     Problem: MUSCULOSKELETAL - ADULT  Goal: Maintain or return mobility to safest level of function  Description: INTERVENTIONS:  - Assess patient's ability to carry out ADLs; assess patient's baseline for ADL function and identify physical deficits which impact ability to perform ADLs (bathing, care of mouth/teeth, toileting, grooming, dressing, etc )  - Assess/evaluate cause of self-care deficits   - Assess range of motion  - Assess patient's mobility  - Assess patient's need for assistive devices and provide as appropriate  - Encourage maximum independence but intervene and supervise when necessary  - Involve family in performance of ADLs  - Assess for home care needs following discharge   - Consider OT consult to assist with ADL evaluation and planning for discharge  - Provide patient education as appropriate  Outcome: Progressing  Goal: Maintain proper alignment of affected body part  Description: INTERVENTIONS:  - Support, maintain and protect limb and body alignment  - Provide patient/ family with appropriate education  Outcome: Progressing     Problem: CARDIOVASCULAR - ADULT  Goal: Maintains optimal cardiac output and hemodynamic stability  Description: INTERVENTIONS:  - Monitor I/O, vital signs and rhythm  - Monitor for S/S and trends of decreased cardiac output  - Administer and titrate ordered vasoactive medications to optimize hemodynamic stability  - Assess quality of pulses, skin color and temperature  - Assess for signs of decreased coronary artery perfusion  - Instruct patient to report change in severity of symptoms  Outcome: Progressing  Goal: Absence of cardiac dysrhythmias or at baseline rhythm  Description: INTERVENTIONS:  - Continuous cardiac monitoring, vital signs, obtain 12 lead EKG if ordered  - Administer antiarrhythmic and heart rate control medications as ordered  - Monitor electrolytes and administer replacement therapy as ordered  Outcome: Progressing     Problem: PAIN - ADULT  Goal: Verbalizes/displays adequate comfort level or baseline comfort level  Description: Interventions:  - Encourage patient to monitor pain and request assistance  - Assess pain using appropriate pain scale  - Administer analgesics based on type and severity of pain and evaluate response  - Implement non-pharmacological measures as appropriate and evaluate response  - Consider cultural and social influences on pain and pain management  - Notify physician/advanced practitioner if interventions unsuccessful or patient reports new pain  Outcome: Progressing     Problem: INFECTION - ADULT  Goal: Absence or prevention of progression during hospitalization  Description: INTERVENTIONS:  - Assess and monitor for signs and symptoms of infection  - Monitor lab/diagnostic results  - Monitor all insertion sites, i e  indwelling lines, tubes, and drains  - Monitor endotracheal if appropriate and nasal secretions for changes in amount and color  - [place] appropriate cooling/warming therapies per order  - Administer medications as ordered  - Instruct and encourage patient and family to use good hand hygiene technique  - Identify and instruct in appropriate isolation precautions for identified infection/condition  Outcome: Progressing  Goal: Absence of fever/infection during neutropenic period  Description: INTERVENTIONS:  - Monitor WBC    Outcome: Progressing     Problem: SAFETY ADULT  Goal: Maintain or return to baseline ADL function  Description: INTERVENTIONS:  -  Assess patient's ability to carry out ADLs; assess patient's baseline for ADL function and identify physical deficits which impact ability to perform ADLs (bathing, care of mouth/teeth, toileting, grooming, dressing, etc )  - Assess/evaluate cause of self-care deficits   - Assess range of motion  - Assess patient's mobility; develop plan if impaired  - Assess patient's need for assistive devices and provide as appropriate  - Encourage maximum independence but intervene and supervise when necessary  - Involve family in performance of ADLs  - Assess for home care needs following discharge   - Consider OT consult to assist with ADL evaluation and planning for discharge  - Provide patient education as appropriate  Outcome: Progressing  Goal: Maintains/Returns to pre admission functional level  Description: INTERVENTIONS:  - Perform BMAT or MOVE assessment daily    - Set and communicate daily mobility goal to care team and patient/family/caregiver     - Collaborate with rehabilitation services on mobility goals if consulted  - Record patient progress and toleration of activity level   Outcome: Progressing  Goal: Patient will remain free of falls  Description: INTERVENTIONS:  - Educate patient/family on patient safety including physical limitations  - Instruct patient to call for assistance with activity   - Consult OT/PT to assist with strengthening/mobility   - Keep Call bell within reach  - Keep bed low and locked with side rails adjusted as appropriate  - Keep care items and personal belongings within reach  - Initiate and maintain comfort rounds  - Make Fall Risk Sign visible to staff  - Apply yellow socks and bracelet for high fall risk patients  - Consider moving patient to room near nurses station  Outcome: Progressing     Problem: DISCHARGE PLANNING  Goal: Discharge to home or other facility with appropriate resources  Description: INTERVENTIONS:  - Identify barriers to discharge w/patient and caregiver  - Arrange for needed discharge resources and transportation as appropriate  - Identify discharge learning needs (meds, wound care, etc )  - Arrange for interpretive services to assist at discharge as needed  - Refer to Case Management Department for coordinating discharge planning if the patient needs post-hospital services based on physician/advanced practitioner order or complex needs related to functional status, cognitive ability, or social support system  Outcome: Progressing     Problem: Knowledge Deficit  Goal: Patient/family/caregiver demonstrates understanding of disease process, treatment plan, medications, and discharge instructions  Description: Complete learning assessment and assess knowledge base    Interventions:  - Provide teaching at level of understanding  - Provide teaching via preferred learning methods  Outcome: Progressing

## 2022-01-16 NOTE — CASE MANAGEMENT
Case Management Discharge Planning Note    Patient name Forest Thorpe S /S -01 MRN [id number]  : 1950 Date 2022       Current Admission Date: 2022  Current Admission Diagnosis:Acute hypoxemic respiratory failure due to [address])   Patient Active Problem List    Diagnosis Date Noted    Acute hypoxemic respiratory failure due to [address]) 2022    Nontraumatic complete tear of right rotator cuff 2021    Biceps tendinopathy of right upper extremity 2021    Primary osteoarthritis of right knee 2021    Acute pain of right shoulder 2021    Dyspnea 2021    Stroke due to embolism of left middle cerebral artery ([facility] ) 2020    Type 2 diabetes mellitus ([facility] ) 2020    Pleural effusion 2020    Cardiomyopathy ([facility] ) 2020    Coronary artery disease involving native coronary artery of native heart without angina pectoris 2020    Mixed hyperlipidemia 2020    Precordial pain 2016    Chronic low back pain 2016    Chronic combined systolic and diastolic congestive heart failure ([facility] ) 2016    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    DISCHARGE DETAILS:    Discharge planning discussed with[de-identified] [employer]  Freedom of Choice: Yes  Comments - Freedom of Choice: CM received an approval and permission via parachute to deliver a portable tank/concentrator to the Pt's room for his d/c, DME given to Ramya Scales  for delivery to room as Pt is COVID-19  [address]ed [address]         Is the patient interested in [facility] at discharge?: No    DME Referral Provided  Referral made for DME?: Yes  DME referral completed for the following items[de-identified] Portable Oxygen concentrator,Portable Oxygen tanks  DME Supplier Name[de-identified] [employer]    Other Referral/Resources/Interventions Provided:  Interventions: DME  Referral Comments: CM received an approval and permission from [address] [place] [address] for the delivery of the Pt's home o2 portable/concentrator           Treatment Team Recommendation: Home  Discharge Destination Plan[de-identified] Home  Transport at Discharge : Family

## 2022-01-19 LAB
BACTERIA BLD CULT: NORMAL
BACTERIA BLD CULT: NORMAL

## 2024-06-01 ENCOUNTER — APPOINTMENT (EMERGENCY)
Dept: CT IMAGING | Facility: HOSPITAL | Age: 74
DRG: 095 | End: 2024-06-01
Payer: MEDICARE

## 2024-06-01 ENCOUNTER — APPOINTMENT (EMERGENCY)
Dept: RADIOLOGY | Facility: HOSPITAL | Age: 74
DRG: 095 | End: 2024-06-01
Payer: MEDICARE

## 2024-06-01 ENCOUNTER — HOSPITAL ENCOUNTER (INPATIENT)
Facility: HOSPITAL | Age: 74
LOS: 9 days | Discharge: HOME HEALTH | DRG: 095 | End: 2024-06-10
Attending: EMERGENCY MEDICINE | Admitting: INTERNAL MEDICINE
Payer: MEDICARE

## 2024-06-01 DIAGNOSIS — M46.26 OSTEOMYELITIS OF LUMBAR SPINE (HCC): ICD-10-CM

## 2024-06-01 DIAGNOSIS — M48.00 SPINAL STENOSIS: ICD-10-CM

## 2024-06-01 DIAGNOSIS — E11.69 TYPE 2 DIABETES MELLITUS WITH OTHER SPECIFIED COMPLICATION, WITH LONG-TERM CURRENT USE OF INSULIN (HCC): ICD-10-CM

## 2024-06-01 DIAGNOSIS — M54.50 CHRONIC MIDLINE LOW BACK PAIN WITHOUT SCIATICA: ICD-10-CM

## 2024-06-01 DIAGNOSIS — E87.1 HYPONATREMIA: Primary | ICD-10-CM

## 2024-06-01 DIAGNOSIS — R78.81 BACTEREMIA: ICD-10-CM

## 2024-06-01 DIAGNOSIS — R68.89 RIGORS: ICD-10-CM

## 2024-06-01 DIAGNOSIS — K04.7 DENTAL INFECTION: ICD-10-CM

## 2024-06-01 DIAGNOSIS — D72.829 ELEVATED WBC COUNT: ICD-10-CM

## 2024-06-01 DIAGNOSIS — R25.2 MUSCLE CRAMPING: ICD-10-CM

## 2024-06-01 DIAGNOSIS — G89.29 CHRONIC MIDLINE LOW BACK PAIN WITHOUT SCIATICA: ICD-10-CM

## 2024-06-01 DIAGNOSIS — Z79.4 TYPE 2 DIABETES MELLITUS WITH OTHER SPECIFIED COMPLICATION, WITH LONG-TERM CURRENT USE OF INSULIN (HCC): ICD-10-CM

## 2024-06-01 PROBLEM — R26.2 AMBULATORY DYSFUNCTION: Status: ACTIVE | Noted: 2024-06-01

## 2024-06-01 PROBLEM — K59.00 CONSTIPATION: Status: ACTIVE | Noted: 2024-06-01

## 2024-06-01 LAB
ALBUMIN SERPL BCP-MCNC: 3.1 G/DL (ref 3.5–5)
ALP SERPL-CCNC: 79 U/L (ref 34–104)
ALT SERPL W P-5'-P-CCNC: 38 U/L (ref 7–52)
ANION GAP SERPL CALCULATED.3IONS-SCNC: 7 MMOL/L (ref 4–13)
ANION GAP SERPL CALCULATED.3IONS-SCNC: 9 MMOL/L (ref 4–13)
AST SERPL W P-5'-P-CCNC: 21 U/L (ref 13–39)
BASOPHILS # BLD AUTO: 0.05 THOUSANDS/ÂΜL (ref 0–0.1)
BASOPHILS NFR BLD AUTO: 0 % (ref 0–1)
BILIRUB SERPL-MCNC: 0.78 MG/DL (ref 0.2–1)
BILIRUB UR QL STRIP: NEGATIVE
BUN SERPL-MCNC: 24 MG/DL (ref 5–25)
BUN SERPL-MCNC: 25 MG/DL (ref 5–25)
CALCIUM ALBUM COR SERPL-MCNC: 9.2 MG/DL (ref 8.3–10.1)
CALCIUM SERPL-MCNC: 8.5 MG/DL (ref 8.4–10.2)
CALCIUM SERPL-MCNC: 9 MG/DL (ref 8.4–10.2)
CHLORIDE SERPL-SCNC: 92 MMOL/L (ref 96–108)
CHLORIDE SERPL-SCNC: 93 MMOL/L (ref 96–108)
CLARITY UR: CLEAR
CO2 SERPL-SCNC: 21 MMOL/L (ref 21–32)
CO2 SERPL-SCNC: 22 MMOL/L (ref 21–32)
COLOR UR: ABNORMAL
CREAT SERPL-MCNC: 1.04 MG/DL (ref 0.6–1.3)
CREAT SERPL-MCNC: 1.05 MG/DL (ref 0.6–1.3)
EOSINOPHIL # BLD AUTO: 0.04 THOUSAND/ÂΜL (ref 0–0.61)
EOSINOPHIL NFR BLD AUTO: 0 % (ref 0–6)
ERYTHROCYTE [DISTWIDTH] IN BLOOD BY AUTOMATED COUNT: 13.3 % (ref 11.6–15.1)
GFR SERPL CREATININE-BSD FRML MDRD: 69 ML/MIN/1.73SQ M
GFR SERPL CREATININE-BSD FRML MDRD: 70 ML/MIN/1.73SQ M
GLUCOSE SERPL-MCNC: 228 MG/DL (ref 65–140)
GLUCOSE SERPL-MCNC: 230 MG/DL (ref 65–140)
GLUCOSE SERPL-MCNC: 271 MG/DL (ref 65–140)
GLUCOSE UR STRIP-MCNC: ABNORMAL MG/DL
HCT VFR BLD AUTO: 38.3 % (ref 36.5–49.3)
HGB BLD-MCNC: 12.9 G/DL (ref 12–17)
HGB UR QL STRIP.AUTO: NEGATIVE
IMM GRANULOCYTES # BLD AUTO: 0.12 THOUSAND/UL (ref 0–0.2)
IMM GRANULOCYTES NFR BLD AUTO: 1 % (ref 0–2)
KETONES UR STRIP-MCNC: NEGATIVE MG/DL
LEUKOCYTE ESTERASE UR QL STRIP: NEGATIVE
LYMPHOCYTES # BLD AUTO: 0.76 THOUSANDS/ÂΜL (ref 0.6–4.47)
LYMPHOCYTES NFR BLD AUTO: 6 % (ref 14–44)
MAGNESIUM SERPL-MCNC: 2 MG/DL (ref 1.9–2.7)
MCH RBC QN AUTO: 30.1 PG (ref 26.8–34.3)
MCHC RBC AUTO-ENTMCNC: 33.7 G/DL (ref 31.4–37.4)
MCV RBC AUTO: 89 FL (ref 82–98)
MONOCYTES # BLD AUTO: 0.97 THOUSAND/ÂΜL (ref 0.17–1.22)
MONOCYTES NFR BLD AUTO: 7 % (ref 4–12)
NEUTROPHILS # BLD AUTO: 11.32 THOUSANDS/ÂΜL (ref 1.85–7.62)
NEUTS SEG NFR BLD AUTO: 86 % (ref 43–75)
NITRITE UR QL STRIP: NEGATIVE
NRBC BLD AUTO-RTO: 0 /100 WBCS
OSMOLALITY UR: 310 MMOL/KG
PH UR STRIP.AUTO: 5.5 [PH]
PLATELET # BLD AUTO: 411 THOUSANDS/UL (ref 149–390)
PMV BLD AUTO: 9.8 FL (ref 8.9–12.7)
POTASSIUM SERPL-SCNC: 4.6 MMOL/L (ref 3.5–5.3)
POTASSIUM SERPL-SCNC: 4.8 MMOL/L (ref 3.5–5.3)
PROT SERPL-MCNC: 6.9 G/DL (ref 6.4–8.4)
PROT UR STRIP-MCNC: NEGATIVE MG/DL
RBC # BLD AUTO: 4.29 MILLION/UL (ref 3.88–5.62)
SODIUM 24H UR-SCNC: 18 MOL/L
SODIUM 24H UR-SCNC: 23 MOL/L
SODIUM SERPL-SCNC: 120 MMOL/L (ref 135–147)
SODIUM SERPL-SCNC: 124 MMOL/L (ref 135–147)
SP GR UR STRIP.AUTO: 1.03 (ref 1–1.03)
TSH SERPL DL<=0.05 MIU/L-ACNC: 2.28 UIU/ML (ref 0.45–4.5)
UROBILINOGEN UR STRIP-ACNC: <2 MG/DL
WBC # BLD AUTO: 13.26 THOUSAND/UL (ref 4.31–10.16)

## 2024-06-01 PROCEDURE — 36415 COLL VENOUS BLD VENIPUNCTURE: CPT | Performed by: EMERGENCY MEDICINE

## 2024-06-01 PROCEDURE — 84300 ASSAY OF URINE SODIUM: CPT | Performed by: INTERNAL MEDICINE

## 2024-06-01 PROCEDURE — 83935 ASSAY OF URINE OSMOLALITY: CPT

## 2024-06-01 PROCEDURE — 99291 CRITICAL CARE FIRST HOUR: CPT | Performed by: EMERGENCY MEDICINE

## 2024-06-01 PROCEDURE — 96365 THER/PROPH/DIAG IV INF INIT: CPT

## 2024-06-01 PROCEDURE — 71260 CT THORAX DX C+: CPT

## 2024-06-01 PROCEDURE — 83735 ASSAY OF MAGNESIUM: CPT | Performed by: EMERGENCY MEDICINE

## 2024-06-01 PROCEDURE — 87185 SC STD ENZYME DETCJ PER NZM: CPT | Performed by: EMERGENCY MEDICINE

## 2024-06-01 PROCEDURE — 82948 REAGENT STRIP/BLOOD GLUCOSE: CPT

## 2024-06-01 PROCEDURE — 87154 CUL TYP ID BLD PTHGN 6+ TRGT: CPT | Performed by: EMERGENCY MEDICINE

## 2024-06-01 PROCEDURE — 74177 CT ABD & PELVIS W/CONTRAST: CPT

## 2024-06-01 PROCEDURE — 87076 CULTURE ANAEROBE IDENT EACH: CPT | Performed by: EMERGENCY MEDICINE

## 2024-06-01 PROCEDURE — 84443 ASSAY THYROID STIM HORMONE: CPT

## 2024-06-01 PROCEDURE — 99285 EMERGENCY DEPT VISIT HI MDM: CPT

## 2024-06-01 PROCEDURE — 96367 TX/PROPH/DG ADDL SEQ IV INF: CPT

## 2024-06-01 PROCEDURE — 99223 1ST HOSP IP/OBS HIGH 75: CPT | Performed by: INTERNAL MEDICINE

## 2024-06-01 PROCEDURE — 85025 COMPLETE CBC W/AUTO DIFF WBC: CPT | Performed by: EMERGENCY MEDICINE

## 2024-06-01 PROCEDURE — 83036 HEMOGLOBIN GLYCOSYLATED A1C: CPT

## 2024-06-01 PROCEDURE — 84300 ASSAY OF URINE SODIUM: CPT | Performed by: EMERGENCY MEDICINE

## 2024-06-01 PROCEDURE — 80048 BASIC METABOLIC PNL TOTAL CA: CPT

## 2024-06-01 PROCEDURE — 71045 X-RAY EXAM CHEST 1 VIEW: CPT

## 2024-06-01 PROCEDURE — 80053 COMPREHEN METABOLIC PANEL: CPT | Performed by: EMERGENCY MEDICINE

## 2024-06-01 PROCEDURE — 87040 BLOOD CULTURE FOR BACTERIA: CPT | Performed by: EMERGENCY MEDICINE

## 2024-06-01 RX ORDER — ACETAMINOPHEN 10 MG/ML
1000 INJECTION, SOLUTION INTRAVENOUS ONCE
Status: COMPLETED | OUTPATIENT
Start: 2024-06-01 | End: 2024-06-01

## 2024-06-01 RX ORDER — INSULIN GLARGINE 100 [IU]/ML
20 INJECTION, SOLUTION SUBCUTANEOUS
Status: DISCONTINUED | OUTPATIENT
Start: 2024-06-01 | End: 2024-06-02

## 2024-06-01 RX ORDER — CEFAZOLIN SODIUM 1 G/50ML
1000 SOLUTION INTRAVENOUS ONCE
Status: COMPLETED | OUTPATIENT
Start: 2024-06-01 | End: 2024-06-01

## 2024-06-01 RX ORDER — AMOXICILLIN 250 MG
1 CAPSULE ORAL
Status: DISCONTINUED | OUTPATIENT
Start: 2024-06-01 | End: 2024-06-02

## 2024-06-01 RX ORDER — POLYETHYLENE GLYCOL 3350 17 G/17G
17 POWDER, FOR SOLUTION ORAL DAILY
Status: DISCONTINUED | OUTPATIENT
Start: 2024-06-02 | End: 2024-06-10 | Stop reason: HOSPADM

## 2024-06-01 RX ORDER — INSULIN LISPRO 100 [IU]/ML
5 INJECTION, SOLUTION INTRAVENOUS; SUBCUTANEOUS
Status: DISCONTINUED | OUTPATIENT
Start: 2024-06-02 | End: 2024-06-03

## 2024-06-01 RX ORDER — DOCUSATE SODIUM 100 MG/1
100 CAPSULE, LIQUID FILLED ORAL 2 TIMES DAILY
Status: DISCONTINUED | OUTPATIENT
Start: 2024-06-01 | End: 2024-06-01

## 2024-06-01 RX ORDER — INSULIN LISPRO 100 [IU]/ML
1-6 INJECTION, SOLUTION INTRAVENOUS; SUBCUTANEOUS
Status: DISCONTINUED | OUTPATIENT
Start: 2024-06-02 | End: 2024-06-02

## 2024-06-01 RX ORDER — SODIUM CHLORIDE 9 MG/ML
75 INJECTION, SOLUTION INTRAVENOUS CONTINUOUS
Status: DISCONTINUED | OUTPATIENT
Start: 2024-06-01 | End: 2024-06-01

## 2024-06-01 RX ADMIN — INSULIN GLARGINE 20 UNITS: 100 INJECTION, SOLUTION SUBCUTANEOUS at 22:33

## 2024-06-01 RX ADMIN — IOHEXOL 80 ML: 350 INJECTION, SOLUTION INTRAVENOUS at 16:02

## 2024-06-01 RX ADMIN — ACETAMINOPHEN 1000 MG: 10 INJECTION INTRAVENOUS at 14:32

## 2024-06-01 RX ADMIN — CEFAZOLIN SODIUM 1000 MG: 1 SOLUTION INTRAVENOUS at 15:23

## 2024-06-01 RX ADMIN — SENNOSIDES AND DOCUSATE SODIUM 1 TABLET: 50; 8.6 TABLET ORAL at 22:33

## 2024-06-01 NOTE — ASSESSMENT & PLAN NOTE
Patient presented to the emergency department for significant muscle spasms of the past 2 weeks currently 10 out of 10 in pain.  The pain is diffuse but present worse on the right side.  Patient has CVA tenderness on the right with some abdominal tenderness on the left and right side bilaterally.    -Lidocaine patch and Tylenol as needed for pain  -Robaxin for pain  -Protonix and Zofran available as needed

## 2024-06-01 NOTE — ASSESSMENT & PLAN NOTE
Patient has had a significant decrease in appetite states he has not had a bowel movement in weeks.  Patient has abdominal tenderness to palpation.  Suspicion that pain may be exacerbated constipation    -Urinary retention protocol  -Aggressive bowel regimen  -Regular diet

## 2024-06-01 NOTE — ASSESSMENT & PLAN NOTE
Recent Labs     06/01/24  1447   SODIUM 120*      Patient has hyponatremia at 120 during admission.  Patient states that he is had a poor appetite for the past several weeks.  Concern for hyponatremia secondary to hypovolemia versus SIADH.    -Will start on 75 cc normal saline  -q6 hr BMP  -Consult nephrology if hyponatremia worsens or increases by greater than 5  -Will obtain serum and urine osmolalities  -Regular diet

## 2024-06-01 NOTE — H&P
[facility]  H&P  Name: Capo Pepper 74 y.o. male I MRN: [id number]  Unit/Bed#: [facility] I Date of Admission: 6/1/2024   Date of Service: 6/1/2024 I Hospital Day: 0      Assessment & Plan   * Hyponatremia  Assessment & Plan  Recent Labs     06/01/24  1447   SODIUM 120*      Patient has hyponatremia at 120 during admission.  Patient states that he is had a poor appetite for the past several weeks.  Concern for hyponatremia secondary to hypovolemia versus SIADH.    -Will start on 75 cc normal saline  -q6 hr BMP  -Consult nephrology if hyponatremia worsens or increases by greater than 5  -Will obtain serum and urine osmolalities  -Regular diet    Ambulatory dysfunction  Assessment & Plan  Patient has had worsening weakness with significant ambulatory dysfunction.  Decreased strength bilaterally to the lower extremities.  Patient does not use assistance with walking at baseline but does need to use a friend's walker.  Concern for dehydration versus underlying infectious etiology versus deconditioning versus other.  Patient had a recent fall at home where he states he fell asleep while on the toilet.    -PT OT evaluations  -Fall precautions  -TSH  -If worsening presentation, can consider MRI for further workup of weakness.  Patient recently had a fall at home but does not have any asymmetry of symptoms or headache.  History of prior strokes    Constipation  Assessment & Plan  Patient has had a significant decrease in appetite states he has not had a bowel movement in weeks.  Patient has abdominal tenderness to palpation.  Suspicion that pain may be exacerbated constipation    -Urinary retention protocol  -Aggressive bowel regimen  -Regular diet    Chronic low back pain  Assessment & Plan  Patient presented to the emergency department for significant muscle spasms of the past 2 weeks currently 10 out of 10 in pain.  The pain is diffuse but present worse on the right side.  Patient has CVA  "tenderness on the right with some abdominal tenderness on the left and right side bilaterally.    -Lidocaine patch and Tylenol as needed for pain  -Robaxin for pain  -Protonix and Zofran available as needed    Type 2 diabetes mellitus (HCC)  Assessment & Plan  Lab Results   Component Value Date    HGBA1C 5.6 11/25/2020       No results for input(s): \"POCGLU\" in the last 72 hours.    Blood Sugar Average: Last 72 hrs:  -Patient prescribed metformin at home unclear if he is currently taking  -Mealtime and bedtime insulin  -Sliding scale protocol             VTE Prophylaxis: Enoxaparin (Lovenox)  / sequential compression device   Code Status: Level 1  POLST: There is no POLST form on file for this patient (pre-hospital)    Anticipated Length of Stay:  Patient will be admitted on an Inpatient basis with an anticipated length of stay of  > 2 midnights.   Justification for Hospital Stay: Hyponatremia    Chief Complaint:   Muscle spasms    History of Present Illness:    Capo Pepper is a 74 y.o. male with a history of chronic combined systolic and diastolic congestive heart failure, cardiomyopathy, stroke on aspirin, type 2 diabetes mellitus who presents with muscle spasms.  According to the patient he came in for worsening muscle spasms for the past 2 weeks.  Pain is currently 10 out of 10 in severity and is located more on the right side of his back and abdomen.  Pain is worsened when he moves the wrong way.    Patient states that he has had intermittent nausea.  Patient states he has had decreased appetite for the past several weeks and as had a bowel movement in 2 weeks. Has been having some difficulty initiating stream and increased frequency of urination some dysuria.  Recently was told that he had an issue with his prostate but is not currently on anything at this time.  He has had intermittent dysuria.  He was seen by his PCP who gave him Augmentin for which she has had 3 days.  He also reports that he has lost " 12 pounds in the last several weeks.    According to the patient normally he is able to walk at home but has intermittently needed to borrow a friend's walker and has been unable to leave his recliner.  To the patient's wife he fell last week when sitting on the toilet.  Patient states that this time he believes he fell asleep and is unsure if he hit his head but denies any headache at this time.  Denies any night sweats.  Patient has been building a shed recently but has not noticed any bites from ticks or ticks present on his pets.    Review of Systems:    Review of Systems   Constitutional:  Positive for appetite change, fatigue and unexpected weight change. Negative for chills and fever.   Respiratory:  Negative for shortness of breath.    Cardiovascular:  Negative for chest pain.   Gastrointestinal:  Positive for abdominal pain and nausea.   Genitourinary:  Positive for dysuria and frequency. Negative for hematuria.   Neurological:  Negative for weakness and numbness.       Past Medical and Surgical History:     Past Medical History:   Diagnosis Date    Atypical chest pain     Back pain     CHF (congestive heart failure) ([facility])     Legionnaire's disease ([facility])     Lung disorder     Migraine        Past Surgical History:   Procedure Laterality Date    ROTATOR CUFF REPAIR Bilateral     TONSILLECTOMY      TRIGGER FINGER RELEASE         Meds/Allergies:    Prior to Admission medications    Medication Sig Start Date End Date Taking? Authorizing Provider   albuterol (2.5 mg/3 mL) 0.083 % nebulizer solution Take 2.5 mg by nebulization every 6 (six) hours as needed for wheezing.  Patient not taking: Reported on 7/29/2021    Historical Provider, MD   apixaban (ELIQUIS) 5 mg Take one tablet ( 5 mg) every 12 hours 12/21/20   EVERT Khan   Ascorbic Acid (vitamin C) 1000 MG tablet Take 1,000 mg by mouth daily    Historical Provider, MD   aspirin 81 mg chewable tablet Chew 81 mg daily    Historical Provider, MD    Cinnamon 500 MG capsule Take 500 mg by mouth daily    Historical Provider, MD   co-enzyme Q-10 50 MG capsule Take 50 mg by mouth daily    Historical Provider, MD   magnesium oxide (MAG-OX) 400 mg Take 400 mg by mouth 2 (two) times a day    Historical Provider, MD   metFORMIN (GLUCOPHAGE) 500 mg tablet Take 500 mg by mouth daily    Historical Provider, MD   metoprolol succinate (TOPROL-XL) 50 mg 24 hr tablet Take 1 tablet (50 mg total) by mouth daily 1/27/21   EVERT Khan   oxyCODONE (ROXICODONE) 5 mg immediate release tablet Take 1 tablet (5 mg total) by mouth every 6 (six) hours as needed for moderate painMax Daily Amount: 20 mg 8/26/21   EVERT Osuna   potassium chloride (K-DUR,KLOR-CON) 20 mEq tablet Take 1 tablet (20 mEq total) by mouth daily 3/10/21   EVERT Khan   ramipril (ALTACE) 1.25 mg capsule Take 1.25 mg by mouth daily.    Historical Provider, MD   Red Yeast Rice Extract (RED YEAST RICE PO) Take 2 tablets by mouth 2 (two) times a day    Historical Provider, MD   rizatriptan (MAXALT) 10 MG tablet Take 10 mg by mouth once as needed for migraine. May repeat in 2 hours if needed    Historical Provider, MD   spironolactone (ALDACTONE) 25 mg tablet Take 12.5 mg by mouth 2 (two) times a day 1/4/21   Historical Provider, MD   torsemide (DEMADEX) 20 mg tablet Take 1 tablet (20 mg total) by mouth daily 3/15/21   EVERT Khan   vitamin B-12 (VITAMIN B-12) 1,000 mcg tablet Take 5,000 mcg by mouth daily    Historical Provider, MD   zinc gluconate 50 mg tablet Take 50 mg by mouth daily    Historical Provider, MD     I have reviewed home medications with patient personally.    Allergies:   Allergies   Allergen Reactions    Poultry Meal - Food Allergy        Social History:     Marital Status: /Civil Union   Occupation: Retired  Patient Pre-hospital Living Situation: Lives with wife and daughter  Patient Pre-hospital Level of Mobility: Walks without assistance  Patient Pre-hospital  Diet Restrictions: None  Substance Use History:   Social History     Substance and Sexual Activity   Alcohol Use Yes     Social History     Tobacco Use   Smoking Status Former    Current packs/day: 0.00    Average packs/day: 1 pack/day for 25.0 years (25.0 ttl pk-yrs)    Types: Cigarettes, Cigars    Start date:     Quit date:     Years since quittin.4   Smokeless Tobacco Never     Social History     Substance and Sexual Activity   Drug Use Not Currently    Comment: CBD Oil pt stated no longer using       Family History:    History reviewed. No pertinent family history.    Physical Exam:     Vitals:   Blood Pressure: 103/58 (24)  Pulse: 79 (24)  Temperature: 99.2 °F (37.3 °C) (24)  Temp Source: Oral (24)  Respirations: 16 (24)  Weight - Scale: 97.7 kg (215 lb 6.2 oz) (24)  SpO2: 95 % (24)    Physical Exam  Constitutional:       Appearance: Normal appearance.   HENT:      Head: Normocephalic and atraumatic.      Right Ear: External ear normal.      Left Ear: External ear normal.      Nose: Nose normal.      Mouth/Throat:      Mouth: Mucous membranes are dry.   Eyes:      Extraocular Movements: Extraocular movements intact.      Conjunctiva/sclera: Conjunctivae normal.      Pupils: Pupils are equal, round, and reactive to light.   Cardiovascular:      Rate and Rhythm: Normal rate and regular rhythm.      Heart sounds: Normal heart sounds.   Pulmonary:      Effort: Pulmonary effort is normal. No respiratory distress.      Breath sounds: Normal breath sounds.   Abdominal:      General: There is distension.      Palpations: Abdomen is soft.      Tenderness: There is abdominal tenderness. There is right CVA tenderness. There is no left CVA tenderness.      Comments: Hyperactive bowel sounds   Musculoskeletal:         General: Normal range of motion.      Cervical back: Normal range of motion and neck supple.      Right lower leg: No  edema.      Left lower leg: No edema.      Comments:  strength intact bilaterally to the upper extremities.  Patient unable to lift legs from bed secondary to pain.  Patient needs assistance sitting upright.   Skin:     General: Skin is warm and dry.      Capillary Refill: Capillary refill takes less than 2 seconds.   Neurological:      Mental Status: He is alert and oriented to person, place, and time. Mental status is at baseline.   Psychiatric:         Mood and Affect: Mood normal.         Behavior: Behavior normal.       Additional Data:     Lab Results: I have personally reviewed pertinent reports.      Results from last 7 days   Lab Units 06/01/24  1447   WBC Thousand/uL 13.26*   HEMOGLOBIN g/dL 12.9   HEMATOCRIT % 38.3   PLATELETS Thousands/uL 411*   SEGS PCT % 86*   LYMPHO PCT % 6*   MONO PCT % 7   EOS PCT % 0     Results from last 7 days   Lab Units 06/01/24  1447   POTASSIUM mmol/L 4.8   CHLORIDE mmol/L 92*   CO2 mmol/L 21   BUN mg/dL 24   CREATININE mg/dL 1.05   CALCIUM mg/dL 8.5   ALK PHOS U/L 79   ALT U/L 38   AST U/L 21           Imaging: I have personally reviewed pertinent reports.      XR chest 1 view portable    Result Date: 6/1/2024  Narrative: XR CHEST PORTABLE INDICATION: febrile, r/o infectious etiology. COMPARISON: Chest CT 6/1/2024, CXR 1/14/2022. FINDINGS: Low lung volumes with mild bibasilar atelectasis. Nothing to indicate pneumonia. No pneumothorax or pleural effusion. Normal cardiomediastinal silhouette. Bones are unremarkable for age. Normal upper abdomen.     Impression: No acute cardiopulmonary disease. Workstation performed: [id number]     CT recon only thoracolumbar (No Charge)    Result Date: 6/1/2024  Narrative: CT THORACIC AND LUMBAR SPINE INDICATION:   r/o lytic lesion / CA. COMPARISON: TECHNIQUE: Axial CT examination of the thoracic and lumbar spine was obtained utilizing reconstructed images from CT of the chest abdomen and pelvis performed the same day. Images were  reformatted in the sagittal and coronal planes. This examination, like all CT scans performed in the [facility], was performed utilizing techniques to minimize radiation dose exposure, including the use of iterative reconstruction and automated exposure control. FINDINGS: ALIGNMENT: Normal alignment. No spondylolisthesis.  No scoliosis. VERTEBRAE:  No fracture.  No acute osseous abnormality. DEGENERATIVE CHANGES: Mild multilevel degenerative disc disease with anterior marginal osteophytes. Greatest at the L5-S1 level and through the lower thoracic spine. No critical stenoses. PREVERTEBRAL AND PARASPINAL SOFT TISSUES: Normal. OTHER: Unremarkable     Impression: No fracture or traumatic subluxation. Workstation performed: [id number]     CT chest abdomen pelvis w contrast    Result Date: 6/1/2024  Narrative: CT CHEST, ABDOMEN AND PELVIS WITH IV CONTRAST INDICATION: some global abd tenderness of unclear etiology. Also w/ CVA tenderness and clinical concern for pyelo. Suspect infectious etiology. COMPARISON: 1/14/2022, 8/18/2016 TECHNIQUE: CT examination of the chest, abdomen and pelvis was performed. Multiplanar 2D reformatted images were created from the source data. This examination, like all CT scans performed in the [facility], was performed utilizing techniques to minimize radiation dose exposure, including the use of iterative reconstruction and automated exposure control. Radiation dose length product (DLP) for this visit: 762 mGy-cm IV Contrast: 80 mL of iohexol (OMNIPAQUE) Enteric Contrast: Not administered. FINDINGS: CHEST LUNGS: Multiple images limited by respiratory motion. There is mild dependent opacities in both lower lobes which have an appearance favoring atelectasis and/or scarring. Minimal component of peripheral bronchiectasis in these areas, possibly residual from infiltrate seen in 2022. PLEURA: Unremarkable. HEART/GREAT VESSELS: Heart is unremarkable for patient's  age. No thoracic aortic aneurysm. MEDIASTINUM AND NIR: Small hiatal hernia. Stable compared with prior. No adenopathy. CHEST WALL AND LOWER NECK: Unremarkable. ABDOMEN LIVER/BILIARY TREE: Unremarkable. GALLBLADDER: No calcified gallstones. No pericholecystic inflammatory change. SPLEEN: Unremarkable. PANCREAS: Unremarkable. ADRENAL GLANDS: Unremarkable. KIDNEYS/URETERS: Several small rounded hypodensities favoring cysts. No CT findings to suggest acute pyelonephritis. No hydronephrosis. No nephroureterolithiasis. STOMACH AND BOWEL: Colonic diverticulosis without findings of acute diverticulitis. APPENDIX: Normal. Incidental note made of an appendicolith. ABDOMINOPELVIC CAVITY: No ascites. No pneumoperitoneum. No lymphadenopathy. VESSELS: Unremarkable for patient's age. PELVIS REPRODUCTIVE ORGANS: Unremarkable for patient's age. URINARY BLADDER: Unremarkable. ABDOMINAL WALL/INGUINAL REGIONS: Unremarkable. BONES: No acute fracture or suspicious osseous lesion.     Impression: Dependent opacities in both lungs favoring combination of atelectasis and mild residual scarring and bronchiectasis. Diverticulosis without evidence for diverticulitis. No findings to suggest pyelonephritis or other acute renal abnormality. Workstation performed: [id number]       EKG, Pathology, and Other Studies Reviewed on Admission:   EKG: Reviewed from 1/14/2022.  PACs with nonspecific ST and T wave abnormality.    [facility] [place] Everywhere Records Reviewed: Yes     ** Please Note: This note has been constructed using a voice recognition system. **

## 2024-06-01 NOTE — ED PROVIDER NOTES
"History  Chief Complaint   Patient presents with    Medical Problem     Pt reports recently dx with possible and given abx, states took 3 of them and now reports stomach upset \"feels like stomach is torn\", also reports back pain x30 years with spasms       Medical Problem  Associated symptoms: abdominal pain, fatigue and fever    Associated symptoms: no chest pain, no cough, no ear pain, no rash, no shortness of breath, no sore throat and no vomiting      74yoM, pmhx below, presenting to ER with intermittent rigors, dysuria, lower back and possible newly developing lower abd pain x2 weeks. Assoc generalized weakness and using walker which is atypical. Denies v/d, change in bowel. Notes no change in PO intake. Wife at bedside. 3 days of augmentin for PCP who writes him an Rx across state lines ([place]) but only heard HPI via phone.       Prior to Admission Medications   Prescriptions Last Dose Informant Patient Reported? Taking?   aspirin 81 mg chewable tablet 6/1/2024 Self Yes Yes   Sig: Chew 81 mg daily   ramipril (ALTACE) 1.25 mg capsule 6/1/2024 Self Yes Yes   Sig: Take 1.25 mg by mouth daily.   spironolactone (ALDACTONE) 25 mg tablet Unknown Self Yes No   Sig: Take 12.5 mg by mouth 2 (two) times a day      Facility-Administered Medications: None       Past Medical History:   Diagnosis Date    Atypical chest pain     Back pain     CHF (congestive heart failure) (HCC)     Legionnaire's disease (HCC)     Lung disorder     Migraine        Past Surgical History:   Procedure Laterality Date    ROTATOR CUFF REPAIR Bilateral     TONSILLECTOMY      TRIGGER FINGER RELEASE         History reviewed. No pertinent family history.  I have reviewed and agree with the history as documented.    E-Cigarette/Vaping    E-Cigarette Use Never User      E-Cigarette/Vaping Substances    Nicotine No     THC No     CBD No     Flavoring No     Other No     Unknown No      Social History     Tobacco Use    Smoking status: Former     Current " packs/day: 0.00     Average packs/day: 1 pack/day for 25.0 years (25.0 ttl pk-yrs)     Types: Cigarettes, Cigars     Start date:      Quit date:      Years since quittin.4    Smokeless tobacco: Never   Vaping Use    Vaping status: Never Used   Substance Use Topics    Alcohol use: Yes    Drug use: Not Currently     Comment: CBD Oil pt stated no longer using       Review of Systems   Constitutional:  Positive for chills, fatigue and fever.   HENT:  Negative for ear pain and sore throat.    Eyes:  Negative for pain and visual disturbance.   Respiratory:  Negative for cough and shortness of breath.    Cardiovascular:  Negative for chest pain and palpitations.   Gastrointestinal:  Positive for abdominal pain. Negative for vomiting.   Genitourinary:  Positive for dysuria. Negative for hematuria.   Musculoskeletal:  Negative for arthralgias and back pain.   Skin:  Negative for color change and rash.   Neurological:  Positive for weakness. Negative for seizures and syncope.   All other systems reviewed and are negative.      Physical Exam  Physical Exam  Vitals and nursing note reviewed.   Constitutional:       General: He is not in acute distress.     Appearance: He is well-developed. He is ill-appearing.   HENT:      Head: Normocephalic and atraumatic.   Eyes:      Conjunctiva/sclera: Conjunctivae normal.   Cardiovascular:      Rate and Rhythm: Normal rate and regular rhythm.      Heart sounds: No murmur heard.  Pulmonary:      Effort: Pulmonary effort is normal. No respiratory distress.      Breath sounds: Normal breath sounds.   Abdominal:      Palpations: Abdomen is soft.      Tenderness: There is abdominal tenderness. There is right CVA tenderness and left CVA tenderness.      Comments: Lower abd tenderness, mild   Musculoskeletal:         General: No swelling.      Cervical back: Neck supple.   Skin:     General: Skin is warm and dry.      Capillary Refill: Capillary refill takes less than 2 seconds.    Neurological:      Mental Status: He is alert.   Psychiatric:         Mood and Affect: Mood normal.         Vital Signs  ED Triage Vitals   Temperature Pulse Respirations Blood Pressure SpO2   06/01/24 1349 06/01/24 1349 06/01/24 1349 06/01/24 1349 06/01/24 1349   99.2 °F (37.3 °C) 85 18 116/58 96 %      Temp Source Heart Rate Source Patient Position - Orthostatic VS BP Location FiO2 (%)   06/01/24 1349 06/01/24 1349 06/01/24 1349 06/01/24 1349 --   Oral Monitor Sitting Right arm       Pain Score       06/01/24 1350       10 - Worst Possible Pain           Vitals:    06/01/24 1349 06/01/24 1620 06/01/24 1857 06/01/24 2024   BP: 116/58 112/68 103/58 109/75   Pulse: 85 74 79 81   Patient Position - Orthostatic VS: Sitting Sitting Sitting Lying         Visual Acuity      ED Medications  Medications   sodium chloride 0.9 % infusion (has no administration in time range)   polyethylene glycol (MIRALAX) packet 17 g (has no administration in time range)   senna-docusate sodium (SENOKOT S) 8.6-50 mg per tablet 1 tablet (has no administration in time range)   insulin lispro (HumALOG/ADMELOG) 100 units/mL subcutaneous injection 1-6 Units (has no administration in time range)   insulin glargine (LANTUS) subcutaneous injection 20 Units 0.2 mL (has no administration in time range)   insulin lispro (HumALOG/ADMELOG) 100 units/mL subcutaneous injection 5 Units (has no administration in time range)   acetaminophen (Ofirmev) injection 1,000 mg (0 mg Intravenous Stopped 6/1/24 1502)   ceFAZolin (ANCEF) IVPB (premix in dextrose) 1,000 mg 50 mL (0 mg Intravenous Stopped 6/1/24 1620)   iohexol (OMNIPAQUE) 350 MG/ML injection (MULTI-DOSE) 80 mL (80 mL Intravenous Given 6/1/24 1602)       Diagnostic Studies  Results Reviewed       Procedure Component Value Units Date/Time    Basic metabolic panel [[id number]]     Lab Status: No result Specimen: Blood     TSH, 3rd generation with Free T4 reflex [[id number]]     Lab Status: No result  Specimen: Blood     Basic metabolic panel [[id number]]     Lab Status: No result Specimen: Blood     UA w Reflex to Microscopic w Reflex to Culture [[id number]]  (Abnormal) Collected: 06/01/24 1653    Lab Status: Final result Specimen: Urine, Clean Catch Updated: 06/01/24 1727     Color, UA Light Yellow     Clarity, UA Clear     Specific Gravity, UA 1.027     pH, UA 5.5     Leukocytes, UA Negative     Nitrite, UA Negative     Protein, UA Negative mg/dl      Glucose, UA Trace mg/dl      Ketones, UA Negative mg/dl      Urobilinogen, UA <2.0 mg/dl      Bilirubin, UA Negative     Occult Blood, UA Negative    Sodium, urine, random [[id number]] Collected: 06/01/24 1654    Lab Status: Final result Specimen: Urine, Clean Catch Updated: 06/01/24 1721     Sodium, Ur 23    Comprehensive metabolic panel [[id number]]  (Abnormal) Collected: 06/01/24 1447    Lab Status: Final result Specimen: Blood from Arm, Right Updated: 06/01/24 1521     Sodium 120 mmol/L      Potassium 4.8 mmol/L      Chloride 92 mmol/L      CO2 21 mmol/L      ANION GAP 7 mmol/L      BUN 24 mg/dL      Creatinine 1.05 mg/dL      Glucose 271 mg/dL      Calcium 8.5 mg/dL      Corrected Calcium 9.2 mg/dL      AST 21 U/L      ALT 38 U/L      Alkaline Phosphatase 79 U/L      Total Protein 6.9 g/dL      Albumin 3.1 g/dL      Total Bilirubin 0.78 mg/dL      eGFR 69 ml/min/1.73sq m     Narrative:      National Kid[employer] Disease Foundation guidelines for Chronic Kidney Disease (CKD):     Stage 1 with normal or high GFR (GFR > 90 mL/min/1.73 square meters)    Stage 2 Mild CKD (GFR = 60-89 mL/min/1.73 square meters)    Stage 3A Moderate CKD (GFR = 45-59 mL/min/1.73 square meters)    Stage 3B Moderate CKD (GFR = 30-44 mL/min/1.73 square meters)    Stage 4 Severe CKD (GFR = 15-29 mL/min/1.73 square meters)    Stage 5 End Stage CKD (GFR <15 mL/min/1.73 square meters)  Note: GFR calculation is accurate only with a steady state creatinine    Magnesium [[id number]]  (Normal) Collected:  06/01/24 1447    Lab Status: Final result Specimen: Blood from Arm, Right Updated: 06/01/24 1521     Magnesium 2.0 mg/dL     CBC and differential [[id number]]  (Abnormal) Collected: 06/01/24 1447    Lab Status: Final result Specimen: Blood from Arm, Right Updated: 06/01/24 1512     WBC 13.26 Thousand/uL      RBC 4.29 Million/uL      Hemoglobin 12.9 g/dL      Hematocrit 38.3 %      MCV 89 fL      MCH 30.1 pg      MCHC 33.7 g/dL      RDW 13.3 %      MPV 9.8 fL      Platelets 411 Thousands/uL      nRBC 0 /100 WBCs      Segmented % 86 %      Immature Grans % 1 %      Lymphocytes % 6 %      Monocytes % 7 %      Eosinophils Relative 0 %      Basophils Relative 0 %      Absolute Neutrophils 11.32 Thousands/µL      Absolute Immature Grans 0.12 Thousand/uL      Absolute Lymphocytes 0.76 Thousands/µL      Absolute Monocytes 0.97 Thousand/µL      Eosinophils Absolute 0.04 Thousand/µL      Basophils Absolute 0.05 Thousands/µL     Blood culture #2 [[id number]] Collected: 06/01/24 1449    Lab Status: In process Specimen: Blood from Hand, Left Updated: 06/01/24 1459    Blood culture #1 [[id number]] Collected: 06/01/24 1447    Lab Status: In process Specimen: Blood from Arm, Right Updated: 06/01/24 1459                   CT recon only thoracolumbar (No Charge)   Final Result by Aishwarya Hall MD (06/01 1657)      No fracture or traumatic subluxation.               Workstation performed: [id number]         CT chest abdomen pelvis w contrast   Final Result by Aishwarya Hall MD (06/01 1652)   Dependent opacities in both lungs favoring combination of atelectasis and mild residual scarring and bronchiectasis.      Diverticulosis without evidence for diverticulitis.      No findings to suggest pyelonephritis or other acute renal abnormality.                           Workstation performed: [id number]         XR chest 1 view portable   Final Result by Syl Perez MD (06/01 1732)      No acute  cardiopulmonary disease.            Workstation performed: [id number]                    Procedures  CriticalCare Time    Date/Time: 6/1/2024 9:06 PM    Performed by: Sherwin Kelly DO  Authorized by: Sherwin Kelly DO    Critical care provider statement:     Critical care time (minutes):  60    Critical care time was exclusive of:  Separately billable procedures and treating other patients and teaching time    Critical care was necessary to treat or prevent imminent or life-threatening deterioration of the following conditions:  Metabolic crisis (severe hyponatremia)    Critical care was time spent personally by me on the following activities:  Obtaining history from patient or surrogate, development of treatment plan with patient or surrogate, discussions with consultants, evaluation of patient's response to treatment, examination of patient, re-evaluation of patient's condition, review of old charts, ordering and review of radiographic studies, ordering and review of laboratory studies and ordering and performing treatments and interventions    I assumed direction of critical care for this patient from another provider in my specialty: no             ED Course  ED Course as of 06/01/24 2107   Sat Jun 01, 2024   1528 With Na 120, further questioned pt. Notes weight loss 10-15 lbs unintentional over last few weeks time. Will expand scan to include spine and chest to r/o CA grossly.                                              Medical Decision Making  74yoM presenting to ER with unclear etiology of potential bacterial illness given WBC elevation and rigors. UA, CXR, CT without infectious patho. Blood cultures pending. Ceftriaxone for potentially urinary source however not founded at this time. Hyponatremia also unclear. Pt appears euvolemic. No signs dehydration. SIADH considered which is why scan expanded to CT chest/abd/pelvis but no signs gross CA. Urine sodium also suggests against. Pt without CNS signs/sx at this  time. Call placed to hospital medicine for admission for further investigation.     Amount and/or Complexity of Data Reviewed  Independent Historian: spouse  External Data Reviewed: notes.  Labs: ordered. Decision-making details documented in ED Course.  Radiology: ordered and independent interpretation performed.     Details: Cxr wnl    Risk  Prescription drug management.  Decision regarding hospitalization.             Disposition  Final diagnoses:   Hyponatremia   Rigors   Elevated WBC count   Muscle cramping     Time reflects when diagnosis was documented in both MDM as applicable and the Disposition within this note       Time User Action Codes Description Comment    6/1/2024  5:11 PM Sherwin Kelly [E87.1] Hyponatremia     6/1/2024  6:48 PM Sherwin Kelly [R68.89] Rigors     6/1/2024  6:48 PM Sherwin Kelly [D72.829] Elevated WBC count     6/1/2024  6:48 PM Sherwin Kelly [R25.2] Muscle cramping           ED Disposition       ED Disposition   Admit    Condition   Stable    Date/Time   Sat Jun 1, 2024  5:11 PM    Comment   Case was discussed with hospitalist and the patient's admission status was agreed to be Admission Status: inpatient status to the service of Dr. Escalante .               Follow-up Information    None         Current Discharge Medication List        CONTINUE these medications which have NOT CHANGED    Details   aspirin 81 mg chewable tablet Chew 81 mg daily      ramipril (ALTACE) 1.25 mg capsule Take 1.25 mg by mouth daily.      spironolactone (ALDACTONE) 25 mg tablet Take 12.5 mg by mouth 2 (two) times a day             No discharge procedures on file.    PDMP Review       None            ED Provider  Electronically Signed by             Sherwin Kelly DO  06/01/24 8431

## 2024-06-02 PROBLEM — D72.829 LEUKOCYTOSIS: Status: ACTIVE | Noted: 2024-06-02

## 2024-06-02 LAB
ANION GAP SERPL CALCULATED.3IONS-SCNC: 7 MMOL/L (ref 4–13)
ANION GAP SERPL CALCULATED.3IONS-SCNC: 7 MMOL/L (ref 4–13)
ANION GAP SERPL CALCULATED.3IONS-SCNC: 8 MMOL/L (ref 4–13)
ANION GAP SERPL CALCULATED.3IONS-SCNC: 8 MMOL/L (ref 4–13)
BUN SERPL-MCNC: 22 MG/DL (ref 5–25)
BUN SERPL-MCNC: 22 MG/DL (ref 5–25)
BUN SERPL-MCNC: 23 MG/DL (ref 5–25)
BUN SERPL-MCNC: 24 MG/DL (ref 5–25)
CALCIUM SERPL-MCNC: 8.6 MG/DL (ref 8.4–10.2)
CALCIUM SERPL-MCNC: 8.7 MG/DL (ref 8.4–10.2)
CALCIUM SERPL-MCNC: 8.7 MG/DL (ref 8.4–10.2)
CALCIUM SERPL-MCNC: 8.9 MG/DL (ref 8.4–10.2)
CHLORIDE SERPL-SCNC: 93 MMOL/L (ref 96–108)
CHLORIDE SERPL-SCNC: 95 MMOL/L (ref 96–108)
CO2 SERPL-SCNC: 21 MMOL/L (ref 21–32)
CO2 SERPL-SCNC: 24 MMOL/L (ref 21–32)
CREAT SERPL-MCNC: 0.98 MG/DL (ref 0.6–1.3)
CREAT SERPL-MCNC: 0.99 MG/DL (ref 0.6–1.3)
CREAT SERPL-MCNC: 1.1 MG/DL (ref 0.6–1.3)
CREAT SERPL-MCNC: 1.12 MG/DL (ref 0.6–1.3)
ERYTHROCYTE [DISTWIDTH] IN BLOOD BY AUTOMATED COUNT: 13.2 % (ref 11.6–15.1)
EST. AVERAGE GLUCOSE BLD GHB EST-MCNC: 223 MG/DL
GFR SERPL CREATININE-BSD FRML MDRD: 64 ML/MIN/1.73SQ M
GFR SERPL CREATININE-BSD FRML MDRD: 65 ML/MIN/1.73SQ M
GFR SERPL CREATININE-BSD FRML MDRD: 74 ML/MIN/1.73SQ M
GFR SERPL CREATININE-BSD FRML MDRD: 75 ML/MIN/1.73SQ M
GLUCOSE SERPL-MCNC: 200 MG/DL (ref 65–140)
GLUCOSE SERPL-MCNC: 235 MG/DL (ref 65–140)
GLUCOSE SERPL-MCNC: 236 MG/DL (ref 65–140)
GLUCOSE SERPL-MCNC: 239 MG/DL (ref 65–140)
GLUCOSE SERPL-MCNC: 242 MG/DL (ref 65–140)
GLUCOSE SERPL-MCNC: 256 MG/DL (ref 65–140)
GLUCOSE SERPL-MCNC: 262 MG/DL (ref 65–140)
GLUCOSE SERPL-MCNC: 268 MG/DL (ref 65–140)
HBA1C MFR BLD: 9.4 %
HCT VFR BLD AUTO: 39.2 % (ref 36.5–49.3)
HGB BLD-MCNC: 13.3 G/DL (ref 12–17)
MCH RBC QN AUTO: 30.4 PG (ref 26.8–34.3)
MCHC RBC AUTO-ENTMCNC: 33.9 G/DL (ref 31.4–37.4)
MCV RBC AUTO: 90 FL (ref 82–98)
OSMOLALITY UR/SERPL-RTO: 282 MMOL/KG (ref 282–298)
PLATELET # BLD AUTO: 381 THOUSANDS/UL (ref 149–390)
PMV BLD AUTO: 9.5 FL (ref 8.9–12.7)
POTASSIUM SERPL-SCNC: 4.2 MMOL/L (ref 3.5–5.3)
POTASSIUM SERPL-SCNC: 4.3 MMOL/L (ref 3.5–5.3)
POTASSIUM SERPL-SCNC: 4.4 MMOL/L (ref 3.5–5.3)
POTASSIUM SERPL-SCNC: 4.5 MMOL/L (ref 3.5–5.3)
RBC # BLD AUTO: 4.38 MILLION/UL (ref 3.88–5.62)
SODIUM SERPL-SCNC: 123 MMOL/L (ref 135–147)
SODIUM SERPL-SCNC: 123 MMOL/L (ref 135–147)
SODIUM SERPL-SCNC: 124 MMOL/L (ref 135–147)
SODIUM SERPL-SCNC: 125 MMOL/L (ref 135–147)
WBC # BLD AUTO: 12.29 THOUSAND/UL (ref 4.31–10.16)

## 2024-06-02 PROCEDURE — 80048 BASIC METABOLIC PNL TOTAL CA: CPT

## 2024-06-02 PROCEDURE — 85027 COMPLETE CBC AUTOMATED: CPT

## 2024-06-02 PROCEDURE — 82948 REAGENT STRIP/BLOOD GLUCOSE: CPT

## 2024-06-02 PROCEDURE — 83930 ASSAY OF BLOOD OSMOLALITY: CPT | Performed by: INTERNAL MEDICINE

## 2024-06-02 PROCEDURE — 99232 SBSQ HOSP IP/OBS MODERATE 35: CPT | Performed by: INTERNAL MEDICINE

## 2024-06-02 PROCEDURE — 80048 BASIC METABOLIC PNL TOTAL CA: CPT | Performed by: INTERNAL MEDICINE

## 2024-06-02 RX ORDER — INSULIN LISPRO 100 [IU]/ML
1-6 INJECTION, SOLUTION INTRAVENOUS; SUBCUTANEOUS
Status: DISCONTINUED | OUTPATIENT
Start: 2024-06-02 | End: 2024-06-10 | Stop reason: HOSPADM

## 2024-06-02 RX ORDER — METHOCARBAMOL 500 MG/1
500 TABLET, FILM COATED ORAL EVERY 6 HOURS PRN
Status: DISCONTINUED | OUTPATIENT
Start: 2024-06-02 | End: 2024-06-02

## 2024-06-02 RX ORDER — METHOCARBAMOL 500 MG/1
500 TABLET, FILM COATED ORAL EVERY 8 HOURS SCHEDULED
Status: DISCONTINUED | OUTPATIENT
Start: 2024-06-02 | End: 2024-06-06

## 2024-06-02 RX ORDER — INSULIN GLARGINE 100 [IU]/ML
25 INJECTION, SOLUTION SUBCUTANEOUS
Status: DISCONTINUED | OUTPATIENT
Start: 2024-06-02 | End: 2024-06-03

## 2024-06-02 RX ORDER — BISACODYL 10 MG
10 SUPPOSITORY, RECTAL RECTAL DAILY PRN
Status: DISCONTINUED | OUTPATIENT
Start: 2024-06-02 | End: 2024-06-10 | Stop reason: HOSPADM

## 2024-06-02 RX ORDER — ACETAMINOPHEN 325 MG/1
975 TABLET ORAL EVERY 8 HOURS SCHEDULED
Status: DISCONTINUED | OUTPATIENT
Start: 2024-06-02 | End: 2024-06-10 | Stop reason: HOSPADM

## 2024-06-02 RX ORDER — IBUPROFEN 600 MG/1
600 TABLET ORAL ONCE
Status: COMPLETED | OUTPATIENT
Start: 2024-06-02 | End: 2024-06-02

## 2024-06-02 RX ORDER — ASPIRIN 81 MG/1
81 TABLET, CHEWABLE ORAL DAILY
Status: DISCONTINUED | OUTPATIENT
Start: 2024-06-02 | End: 2024-06-06

## 2024-06-02 RX ORDER — LIDOCAINE 50 MG/G
2 PATCH TOPICAL DAILY PRN
Status: COMPLETED | OUTPATIENT
Start: 2024-06-02 | End: 2024-06-03

## 2024-06-02 RX ORDER — LIDOCAINE 50 MG/G
1 PATCH TOPICAL DAILY PRN
Status: DISCONTINUED | OUTPATIENT
Start: 2024-06-02 | End: 2024-06-02

## 2024-06-02 RX ORDER — AMOXICILLIN 250 MG
1 CAPSULE ORAL 2 TIMES DAILY
Status: DISCONTINUED | OUTPATIENT
Start: 2024-06-02 | End: 2024-06-02

## 2024-06-02 RX ORDER — ONDANSETRON 2 MG/ML
4 INJECTION INTRAMUSCULAR; INTRAVENOUS EVERY 6 HOURS PRN
Status: DISCONTINUED | OUTPATIENT
Start: 2024-06-02 | End: 2024-06-02

## 2024-06-02 RX ORDER — SODIUM CHLORIDE 9 MG/ML
75 INJECTION, SOLUTION INTRAVENOUS CONTINUOUS
Status: DISCONTINUED | OUTPATIENT
Start: 2024-06-02 | End: 2024-06-02

## 2024-06-02 RX ORDER — LISINOPRIL 5 MG/1
5 TABLET ORAL DAILY
Status: DISCONTINUED | OUTPATIENT
Start: 2024-06-02 | End: 2024-06-03

## 2024-06-02 RX ORDER — ENOXAPARIN SODIUM 100 MG/ML
40 INJECTION SUBCUTANEOUS
Status: DISPENSED | OUTPATIENT
Start: 2024-06-02 | End: 2024-06-09

## 2024-06-02 RX ORDER — ENOXAPARIN SODIUM 100 MG/ML
40 INJECTION SUBCUTANEOUS DAILY
Status: DISCONTINUED | OUTPATIENT
Start: 2024-06-02 | End: 2024-06-02

## 2024-06-02 RX ORDER — ACETAMINOPHEN 325 MG/1
650 TABLET ORAL EVERY 6 HOURS PRN
Status: DISCONTINUED | OUTPATIENT
Start: 2024-06-02 | End: 2024-06-02

## 2024-06-02 RX ORDER — AMOXICILLIN 250 MG
1 CAPSULE ORAL 2 TIMES DAILY
Status: DISCONTINUED | OUTPATIENT
Start: 2024-06-02 | End: 2024-06-10 | Stop reason: HOSPADM

## 2024-06-02 RX ORDER — ONDANSETRON 2 MG/ML
4 INJECTION INTRAMUSCULAR; INTRAVENOUS EVERY 4 HOURS PRN
Status: DISCONTINUED | OUTPATIENT
Start: 2024-06-02 | End: 2024-06-05

## 2024-06-02 RX ORDER — SPIRONOLACTONE 25 MG/1
12.5 TABLET ORAL 2 TIMES DAILY
Status: DISCONTINUED | OUTPATIENT
Start: 2024-06-02 | End: 2024-06-02

## 2024-06-02 RX ORDER — METHOCARBAMOL 500 MG/1
500 TABLET, FILM COATED ORAL EVERY 6 HOURS SCHEDULED
Status: DISCONTINUED | OUTPATIENT
Start: 2024-06-02 | End: 2024-06-02

## 2024-06-02 RX ORDER — SPIRONOLACTONE 25 MG/1
12.5 TABLET ORAL 2 TIMES DAILY
Status: DISCONTINUED | OUTPATIENT
Start: 2024-06-02 | End: 2024-06-03

## 2024-06-02 RX ADMIN — LISINOPRIL 5 MG: 5 TABLET ORAL at 11:24

## 2024-06-02 RX ADMIN — DICLOFENAC SODIUM 2 G: 10 GEL TOPICAL at 17:11

## 2024-06-02 RX ADMIN — SPIRONOLACTONE 12.5 MG: 25 TABLET ORAL at 17:10

## 2024-06-02 RX ADMIN — INSULIN LISPRO 2 UNITS: 100 INJECTION, SOLUTION INTRAVENOUS; SUBCUTANEOUS at 22:05

## 2024-06-02 RX ADMIN — INSULIN GLARGINE 25 UNITS: 100 INJECTION, SOLUTION SUBCUTANEOUS at 22:03

## 2024-06-02 RX ADMIN — ASPIRIN 81 MG CHEWABLE TABLET 81 MG: 81 TABLET CHEWABLE at 11:24

## 2024-06-02 RX ADMIN — METHOCARBAMOL 500 MG: 500 TABLET ORAL at 22:03

## 2024-06-02 RX ADMIN — POLYETHYLENE GLYCOL 3350 17 G: 17 POWDER, FOR SOLUTION ORAL at 08:58

## 2024-06-02 RX ADMIN — INSULIN LISPRO 3 UNITS: 100 INJECTION, SOLUTION INTRAVENOUS; SUBCUTANEOUS at 12:00

## 2024-06-02 RX ADMIN — LIDOCAINE 5% 2 PATCH: 700 PATCH TOPICAL at 20:12

## 2024-06-02 RX ADMIN — INSULIN LISPRO 3 UNITS: 100 INJECTION, SOLUTION INTRAVENOUS; SUBCUTANEOUS at 08:58

## 2024-06-02 RX ADMIN — DICLOFENAC SODIUM 2 G: 10 GEL TOPICAL at 11:30

## 2024-06-02 RX ADMIN — DICLOFENAC SODIUM 2 G: 10 GEL TOPICAL at 08:59

## 2024-06-02 RX ADMIN — METHOCARBAMOL 500 MG: 500 TABLET ORAL at 13:28

## 2024-06-02 RX ADMIN — ACETAMINOPHEN 975 MG: 325 TABLET, FILM COATED ORAL at 13:28

## 2024-06-02 RX ADMIN — ACETAMINOPHEN 975 MG: 325 TABLET, FILM COATED ORAL at 22:03

## 2024-06-02 RX ADMIN — SODIUM CHLORIDE 75 ML/HR: 0.9 INJECTION, SOLUTION INTRAVENOUS at 11:24

## 2024-06-02 RX ADMIN — ENOXAPARIN SODIUM 40 MG: 40 INJECTION SUBCUTANEOUS at 09:32

## 2024-06-02 RX ADMIN — INSULIN LISPRO 5 UNITS: 100 INJECTION, SOLUTION INTRAVENOUS; SUBCUTANEOUS at 17:11

## 2024-06-02 RX ADMIN — SENNOSIDES, DOCUSATE SODIUM 1 TABLET: 8.6; 5 TABLET ORAL at 17:10

## 2024-06-02 RX ADMIN — METHOCARBAMOL 500 MG: 500 TABLET ORAL at 08:58

## 2024-06-02 RX ADMIN — DICLOFENAC SODIUM 2 G: 10 GEL TOPICAL at 22:03

## 2024-06-02 RX ADMIN — IBUPROFEN 600 MG: 600 TABLET, FILM COATED ORAL at 05:40

## 2024-06-02 RX ADMIN — INSULIN LISPRO 3 UNITS: 100 INJECTION, SOLUTION INTRAVENOUS; SUBCUTANEOUS at 17:10

## 2024-06-02 RX ADMIN — SENNOSIDES, DOCUSATE SODIUM 1 TABLET: 8.6; 5 TABLET ORAL at 08:58

## 2024-06-02 RX ADMIN — INSULIN LISPRO 5 UNITS: 100 INJECTION, SOLUTION INTRAVENOUS; SUBCUTANEOUS at 08:59

## 2024-06-02 RX ADMIN — INSULIN LISPRO 5 UNITS: 100 INJECTION, SOLUTION INTRAVENOUS; SUBCUTANEOUS at 12:01
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
## 2024-06-02 NOTE — PLAN OF CARE
Problem: PAIN - ADULT  Goal: Verbalizes/displays adequate comfort level or baseline comfort level  Description: Interventions:  - Encourage patient to monitor pain and request assistance  - Assess pain using appropriate pain scale  - Administer analgesics based on type and severity of pain and evaluate response  - Implement non-pharmacological measures as appropriate and evaluate response  - Consider cultural and social influences on pain and pain management  - Notify physician/advanced practitioner if interventions unsuccessful or patient reports new pain  Outcome: Progressing     Problem: INFECTION - ADULT  Goal: Absence or prevention of progression during hospitalization  Description: INTERVENTIONS:  - Assess and monitor for signs and symptoms of infection  - Monitor lab/diagnostic results  - Monitor all insertion sites, i.e. indwelling lines, tubes, and drains  - Monitor endotracheal if appropriate and nasal secretions for changes in amount and color  - [place] appropriate cooling/warming therapies per order  - Administer medications as ordered  - Instruct and encourage patient and family to use good hand hygiene technique  - Identify and instruct in appropriate isolation precautions for identified infection/condition  Outcome: Progressing  Goal: Absence of fever/infection during neutropenic period  Description: INTERVENTIONS:  - Monitor WBC    Outcome: Progressing     Problem: SAFETY ADULT  Goal: Patient will remain free of falls  Description: INTERVENTIONS:  - Educate patient/family on patient safety including physical limitations  - Instruct patient to call for assistance with activity   - Consult OT/PT to assist with strengthening/mobility   - Keep Call bell within reach  - Keep bed low and locked with side rails adjusted as appropriate  - Keep care items and personal belongings within reach  - Initiate and maintain comfort rounds  - Make Fall Risk Sign visible to staff  - Offer Toileting every  Hours,  in advance of need  - Initiate/Maintain alarm  - Obtain necessary fall risk management equipment:   - Apply yellow socks and bracelet for high fall risk patients  - Consider moving patient to room near nurses station  Outcome: Progressing  Goal: Maintain or return to baseline ADL function  Description: INTERVENTIONS:  -  Assess patient's ability to carry out ADLs; assess patient's baseline for ADL function and identify physical deficits which impact ability to perform ADLs (bathing, care of mouth/teeth, toileting, grooming, dressing, etc.)  - Assess/evaluate cause of self-care deficits   - Assess range of motion  - Assess patient's mobility; develop plan if impaired  - Assess patient's need for assistive devices and provide as appropriate  - Encourage maximum independence but intervene and supervise when necessary  - Involve family in performance of ADLs  - Assess for home care needs following discharge   - Consider OT consult to assist with ADL evaluation and planning for discharge  - Provide patient education as appropriate  Outcome: Progressing  Goal: Maintains/Returns to pre admission functional level  Description: INTERVENTIONS:  - Perform AM-PAC 6 Click Basic Mobility/ Daily Activity assessment daily.  - Set and communicate daily mobility goal to care team and patient/family/caregiver.   - Collaborate with rehabilitation services on mobility goals if consulted  - Perform Range of Motion  times a day.  - Reposition patient every  hours.  - Dangle patient  times a day  - Stand patient times a day  - Ambulate patient  times a day  - Out of bed to chair  times a day   - Out of bed for meals  times a day  - Out of bed for toileting  - Record patient progress and toleration of activity level   Outcome: Progressing     Problem: DISCHARGE PLANNING  Goal: Discharge to home or other facility with appropriate resources  Description: INTERVENTIONS:  - Identify barriers to discharge w/patient and caregiver  - Arrange for  needed discharge resources and transportation as appropriate  - Identify discharge learning needs (meds, wound care, etc.)  - Arrange for interpretive services to assist at discharge as needed  - Refer to Case Management Department for coordinating discharge planning if the patient needs post-hospital services based on physician/advanced practitioner order or complex needs related to functional status, cognitive ability, or social support system  Outcome: Progressing     Problem: Knowledge Deficit  Goal: Patient/family/caregiver demonstrates understanding of disease process, treatment plan, medications, and discharge instructions  Description: Complete learning assessment and assess knowledge base.  Interventions:  - Provide teaching at level of understanding  - Provide teaching via preferred learning methods  Outcome: Progressing

## 2024-06-02 NOTE — ASSESSMENT & PLAN NOTE
Patient has had worsening weakness with significant ambulatory dysfunction.  Decreased strength bilaterally to the lower extremities.  Patient does not use assistance with walking at baseline but does need to use a friend's walker.  Concern for dehydration versus underlying infectious etiology versus deconditioning versus other.  Patient had a recent fall at home where he states he fell asleep while on the toilet.    -PT OT evaluations  -Fall precautions  -TSH  -If worsening presentation, can consider MRI for further workup of weakness.  Patient recently had a fall at home but does not have any asymmetry of symptoms or headache.  History of prior strokes

## 2024-06-02 NOTE — QUICK NOTE
Admission sodium 120(corrected 124 mEq) at 14:47  BMP corrected sodium 124( corrected 127) at 21:30  Plan   -NS infusion not yet started- Hold off for now for risk of overcorrection  Will check BMP in 3 hours with subsequent recommendations.  Monitor neurostatus.

## 2024-06-02 NOTE — ASSESSMENT & PLAN NOTE
Lab Results   Component Value Date    HGBA1C 5.6 11/25/2020       Recent Labs     06/01/24  2156 06/02/24  0752   POCGLU 228* 268*       Blood Sugar Average: Last 72 hrs:  (P) 248    Home Medications: Metformin, unclear if patient is taking it.  6/1: A1c is 9.4    Plan:  SSI while inpatient  Increase Lantus 32 units, qhs   Increase Humalog 10 units,TID with meals   Glucose checks and Insulin correction ACHS  Hypoglycemia protocol.

## 2024-06-02 NOTE — ASSESSMENT & PLAN NOTE
Recent Labs     06/01/24  2120 06/02/24  0026 06/02/24  0655   SODIUM 124* 124* 123*      Assessment:  Reported decreased p.o. intake for the past few weeks.   corrected for hyperglycemia during admission.    Serum osmolality 282, urine osmolality 310, urine sodium 23.  Slowly improving sodium.    Plan:  Discontinue IVF 75 cc/hr   BMP every 6 hours (goal sodium 130-132 tomorrow morning)   Diabetic diet with 2 L fluid restriction.  Hold off on sodium tablets given poor EF.

## 2024-06-02 NOTE — ASSESSMENT & PLAN NOTE
Wt Readings from Last 3 Encounters:   06/01/24 94.3 kg (207 lb 14.3 oz)   01/14/22 96 kg (211 lb 10.3 oz)   08/26/21 99.8 kg (220 lb)     Assessment:  Echo January 2021: EF 25%, severe diffuse hypokinesis with regional variations. G2DD.  Home medications: Ramipril and spironolactone.  Patient is not volume overloaded on exam.    Plan:   Sodium restriction 2g/day, Fluid restriction 2L/day  AM BMP  HOB > 30°, Daily standing weights, Measure I/O

## 2024-06-02 NOTE — ASSESSMENT & PLAN NOTE
Assessment:  Patient has had a significant decrease in appetite states he has not had a bowel movement in weeks.   6/1: CT abdomen pelvis: Dependent opacities in both lungs favoring combination of atelectasis and mild residual scarring and bronchiectasis. Diverticulosis without evidence for diverticulitis.   Have not had a bowel movement yet.    Plan:  Bowel regimen: MiraLAX and senna. Dulcolax suppository as needed.  Discussed with patient Dulcolax suppository he is hesitant to take it but he will consider it.

## 2024-06-02 NOTE — ASSESSMENT & PLAN NOTE
Patient has had worsening weakness with significant ambulatory dysfunction.  Decreased strength bilaterally to the lower extremities.  Patient does not use assistance with walking at baseline but does need to use a friend's walker.   Had recent fall at home where he states he fell asleep while on the toilet.    Plan:  PT/OT consulted  Fall precautions

## 2024-06-02 NOTE — ASSESSMENT & PLAN NOTE
Patient has had a significant decrease in appetite states he has not had a bowel movement in weeks.   6/1: CT abdomen pelvis: Dependent opacities in both lungs favoring combination of atelectasis and mild residual scarring and bronchiectasis. Diverticulosis without evidence for diverticulitis.     Plan:  Urinary retention protocol  Bowel regimen: MiraLAX and senna. Dulcolax suppository as needed.
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
## 2024-06-02 NOTE — PLAN OF CARE
Problem: PAIN - ADULT  Goal: Verbalizes/displays adequate comfort level or baseline comfort level  Description: Interventions:  - Encourage patient to monitor pain and request assistance  - Assess pain using appropriate pain scale  - Administer analgesics based on type and severity of pain and evaluate response  - Implement non-pharmacological measures as appropriate and evaluate response  - Consider cultural and social influences on pain and pain management  - Notify physician/advanced practitioner if interventions unsuccessful or patient reports new pain  Outcome: Progressing     Problem: INFECTION - ADULT  Goal: Absence or prevention of progression during hospitalization  Description: INTERVENTIONS:  - Assess and monitor for signs and symptoms of infection  - Monitor lab/diagnostic results  - Monitor all insertion sites, i.e. indwelling lines, tubes, and drains  - Monitor endotracheal if appropriate and nasal secretions for changes in amount and color  - [place] appropriate cooling/warming therapies per order  - Administer medications as ordered  - Instruct and encourage patient and family to use good hand hygiene technique  - Identify and instruct in appropriate isolation precautions for identified infection/condition  Outcome: Progressing  Goal: Absence of fever/infection during neutropenic period  Description: INTERVENTIONS:  - Monitor WBC    Outcome: Progressing     Problem: SAFETY ADULT  Goal: Patient will remain free of falls  Description: INTERVENTIONS:  - Educate patient/family on patient safety including physical limitations  - Instruct patient to call for assistance with activity   - Consult OT/PT to assist with strengthening/mobility   - Keep Call bell within reach  - Keep bed low and locked with side rails adjusted as appropriate  - Keep care items and personal belongings within reach  - Initiate and maintain comfort rounds  - Make Fall Risk Sign visible to staff  - Offer Toileting every 2 Hours,  in advance of need  - Obtain necessary fall risk management equipment: call bell in reach  - Apply yellow socks and bracelet for high fall risk patients  - Consider moving patient to room near nurses station  Outcome: Progressing  Goal: Maintain or return to baseline ADL function  Description: INTERVENTIONS:  -  Assess patient's ability to carry out ADLs; assess patient's baseline for ADL function and identify physical deficits which impact ability to perform ADLs (bathing, care of mouth/teeth, toileting, grooming, dressing, etc.)  - Assess/evaluate cause of self-care deficits   - Assess range of motion  - Assess patient's mobility; develop plan if impaired  - Assess patient's need for assistive devices and provide as appropriate  - Encourage maximum independence but intervene and supervise when necessary  - Involve family in performance of ADLs  - Assess for home care needs following discharge   - Consider OT consult to assist with ADL evaluation and planning for discharge  - Provide patient education as appropriate  Outcome: Progressing  Goal: Maintains/Returns to pre admission functional level  Description: INTERVENTIONS:  - Perform AM-PAC 6 Click Basic Mobility/ Daily Activity assessment daily.  - Set and communicate daily mobility goal to care team and patient/family/caregiver.   - Collaborate with rehabilitation services on mobility goals if consulted  - Perform Range of Motion 3 times a day.  - Reposition patient every 2 hours.  - Dangle patient 3 times a day  - Stand patient 3 times a day  - Ambulate patient 3 times a day  - Out of bed to chair 3 times a day   - Out of bed for meals 3 times a day  - Out of bed for toileting  - Record patient progress and toleration of activity level   Outcome: Progressing     Problem: DISCHARGE PLANNING  Goal: Discharge to home or other facility with appropriate resources  Description: INTERVENTIONS:  - Identify barriers to discharge w/patient and caregiver  - Arrange for  needed discharge resources and transportation as appropriate  - Identify discharge learning needs (meds, wound care, etc.)  - Arrange for interpretive services to assist at discharge as needed  - Refer to Case Management Department for coordinating discharge planning if the patient needs post-hospital services based on physician/advanced practitioner order or complex needs related to functional status, cognitive ability, or social support system  Outcome: Progressing     Problem: Knowledge Deficit  Goal: Patient/family/caregiver demonstrates understanding of disease process, treatment plan, medications, and discharge instructions  Description: Complete learning assessment and assess knowledge base.  Interventions:  - Provide teaching at level of understanding  - Provide teaching via preferred learning methods  Outcome: Progressing

## 2024-06-02 NOTE — ASSESSMENT & PLAN NOTE
"Lab Results   Component Value Date    HGBA1C 5.6 11/25/2020       No results for input(s): \"POCGLU\" in the last 72 hours.    Blood Sugar Average: Last 72 hrs:  -Patient prescribed metformin at home unclear if he is currently taking  -Mealtime and bedtime insulin  -Sliding scale protocol    "

## 2024-06-02 NOTE — ASSESSMENT & PLAN NOTE
Lab Results   Component Value Date    HGBA1C 5.6 11/25/2020       Recent Labs     06/01/24  2156 06/02/24  0752   POCGLU 228* 268*       Blood Sugar Average: Last 72 hrs:  (P) 248    Home Medications: Metformin, unclear if patient is taking it.    Plan:  SSI while inpatient  Increase Lantus 25 units, qhs   Humalog 5 units,TID with meals   Glucose checks and Insulin correction ACHS  Hypoglycemia protocol.  Follow-up on hemoglobin A1c.
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
## 2024-06-02 NOTE — ASSESSMENT & PLAN NOTE
Recent Labs     06/01/24  2120 06/02/24  0026 06/02/24  0655   SODIUM 124* 124* 123*        Patient does report decreased p.o. intake for the past few weeks.   corrected for hyperglycemia during admission.    Serum osmolality 282, urine osmolality 310, urine sodium 23.  Sodium this a.m. corrected for hyperglycemia 126, improved without any IVF.  Based on hyponatremia workup, etiology likely hypovolemic hyponatremia in the setting of decreased p.o. intake.    Plan:  BMP every 6 hours (goal sodium 130-132 by tomorrow morning)  Consult nephrology if hyponatremia worsens or increases by greater than 5  Diabetic diet with 2 L fluid restriction.
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
## 2024-06-02 NOTE — ASSESSMENT & PLAN NOTE
Patient has had worsening weakness with significant ambulatory dysfunction.  Decreased strength bilaterally to the lower extremities.  Patient does not use assistance with walking at baseline but does need to use a friend's walker. Patient had a recent fall at home where he states he fell asleep while on the toilet.    Plan:  PT/OT consulted  Fall precautions

## 2024-06-03 ENCOUNTER — APPOINTMENT (INPATIENT)
Dept: CT IMAGING | Facility: HOSPITAL | Age: 74
DRG: 095 | End: 2024-06-03
Payer: MEDICARE

## 2024-06-03 PROBLEM — R78.81 BACTEREMIA: Status: ACTIVE | Noted: 2024-06-03

## 2024-06-03 LAB
ANION GAP SERPL CALCULATED.3IONS-SCNC: 10 MMOL/L (ref 4–13)
ANION GAP SERPL CALCULATED.3IONS-SCNC: 6 MMOL/L (ref 4–13)
ANION GAP SERPL CALCULATED.3IONS-SCNC: 7 MMOL/L (ref 4–13)
ANION GAP SERPL CALCULATED.3IONS-SCNC: 8 MMOL/L (ref 4–13)
BUN SERPL-MCNC: 22 MG/DL (ref 5–25)
BUN SERPL-MCNC: 25 MG/DL (ref 5–25)
BUN SERPL-MCNC: 25 MG/DL (ref 5–25)
BUN SERPL-MCNC: 27 MG/DL (ref 5–25)
CALCIUM SERPL-MCNC: 8.4 MG/DL (ref 8.4–10.2)
CALCIUM SERPL-MCNC: 8.5 MG/DL (ref 8.4–10.2)
CALCIUM SERPL-MCNC: 9 MG/DL (ref 8.4–10.2)
CALCIUM SERPL-MCNC: 9.1 MG/DL (ref 8.4–10.2)
CHLORIDE SERPL-SCNC: 95 MMOL/L (ref 96–108)
CHLORIDE SERPL-SCNC: 96 MMOL/L (ref 96–108)
CHLORIDE SERPL-SCNC: 98 MMOL/L (ref 96–108)
CHLORIDE SERPL-SCNC: 98 MMOL/L (ref 96–108)
CO2 SERPL-SCNC: 21 MMOL/L (ref 21–32)
CO2 SERPL-SCNC: 21 MMOL/L (ref 21–32)
CO2 SERPL-SCNC: 22 MMOL/L (ref 21–32)
CO2 SERPL-SCNC: 22 MMOL/L (ref 21–32)
CREAT SERPL-MCNC: 1 MG/DL (ref 0.6–1.3)
CREAT SERPL-MCNC: 1.13 MG/DL (ref 0.6–1.3)
CREAT SERPL-MCNC: 1.16 MG/DL (ref 0.6–1.3)
CREAT SERPL-MCNC: 1.2 MG/DL (ref 0.6–1.3)
ERYTHROCYTE [DISTWIDTH] IN BLOOD BY AUTOMATED COUNT: 13.2 % (ref 11.6–15.1)
GFR SERPL CREATININE-BSD FRML MDRD: 59 ML/MIN/1.73SQ M
GFR SERPL CREATININE-BSD FRML MDRD: 61 ML/MIN/1.73SQ M
GFR SERPL CREATININE-BSD FRML MDRD: 63 ML/MIN/1.73SQ M
GFR SERPL CREATININE-BSD FRML MDRD: 73 ML/MIN/1.73SQ M
GLUCOSE SERPL-MCNC: 145 MG/DL (ref 65–140)
GLUCOSE SERPL-MCNC: 147 MG/DL (ref 65–140)
GLUCOSE SERPL-MCNC: 175 MG/DL (ref 65–140)
GLUCOSE SERPL-MCNC: 183 MG/DL (ref 65–140)
GLUCOSE SERPL-MCNC: 188 MG/DL (ref 65–140)
GLUCOSE SERPL-MCNC: 230 MG/DL (ref 65–140)
GLUCOSE SERPL-MCNC: 258 MG/DL (ref 65–140)
GLUCOSE SERPL-MCNC: 262 MG/DL (ref 65–140)
HCT VFR BLD AUTO: 35.3 % (ref 36.5–49.3)
HGB BLD-MCNC: 11.9 G/DL (ref 12–17)
MCH RBC QN AUTO: 30.1 PG (ref 26.8–34.3)
MCHC RBC AUTO-ENTMCNC: 33.7 G/DL (ref 31.4–37.4)
MCV RBC AUTO: 89 FL (ref 82–98)
PLATELET # BLD AUTO: 368 THOUSANDS/UL (ref 149–390)
PMV BLD AUTO: 10.1 FL (ref 8.9–12.7)
POTASSIUM SERPL-SCNC: 4.2 MMOL/L (ref 3.5–5.3)
POTASSIUM SERPL-SCNC: 4.2 MMOL/L (ref 3.5–5.3)
POTASSIUM SERPL-SCNC: 4.3 MMOL/L (ref 3.5–5.3)
POTASSIUM SERPL-SCNC: 4.4 MMOL/L (ref 3.5–5.3)
RBC # BLD AUTO: 3.96 MILLION/UL (ref 3.88–5.62)
SODIUM SERPL-SCNC: 126 MMOL/L (ref 135–147)
WBC # BLD AUTO: 9.76 THOUSAND/UL (ref 4.31–10.16)

## 2024-06-03 PROCEDURE — 85027 COMPLETE CBC AUTOMATED: CPT

## 2024-06-03 PROCEDURE — 80048 BASIC METABOLIC PNL TOTAL CA: CPT

## 2024-06-03 PROCEDURE — 70487 CT MAXILLOFACIAL W/DYE: CPT

## 2024-06-03 PROCEDURE — 87040 BLOOD CULTURE FOR BACTERIA: CPT

## 2024-06-03 PROCEDURE — 99232 SBSQ HOSP IP/OBS MODERATE 35: CPT | Performed by: INTERNAL MEDICINE

## 2024-06-03 PROCEDURE — 99223 1ST HOSP IP/OBS HIGH 75: CPT | Performed by: INTERNAL MEDICINE

## 2024-06-03 PROCEDURE — 82948 REAGENT STRIP/BLOOD GLUCOSE: CPT

## 2024-06-03 RX ORDER — LIDOCAINE 50 MG/G
1 PATCH TOPICAL DAILY
Status: DISCONTINUED | OUTPATIENT
Start: 2024-06-03 | End: 2024-06-10 | Stop reason: HOSPADM

## 2024-06-03 RX ORDER — INSULIN GLARGINE 100 [IU]/ML
32 INJECTION, SOLUTION SUBCUTANEOUS
Status: DISCONTINUED | OUTPATIENT
Start: 2024-06-03 | End: 2024-06-10 | Stop reason: HOSPADM

## 2024-06-03 RX ORDER — INSULIN LISPRO 100 [IU]/ML
8 INJECTION, SOLUTION INTRAVENOUS; SUBCUTANEOUS
Status: DISCONTINUED | OUTPATIENT
Start: 2024-06-03 | End: 2024-06-03

## 2024-06-03 RX ORDER — INSULIN LISPRO 100 [IU]/ML
10 INJECTION, SOLUTION INTRAVENOUS; SUBCUTANEOUS
Status: DISCONTINUED | OUTPATIENT
Start: 2024-06-03 | End: 2024-06-10 | Stop reason: HOSPADM

## 2024-06-03 RX ORDER — SODIUM CHLORIDE 1 G/1
1 TABLET ORAL
Status: DISCONTINUED | OUTPATIENT
Start: 2024-06-03 | End: 2024-06-05

## 2024-06-03 RX ORDER — METRONIDAZOLE 500 MG/1
500 TABLET ORAL EVERY 8 HOURS SCHEDULED
Status: DISCONTINUED | OUTPATIENT
Start: 2024-06-03 | End: 2024-06-10 | Stop reason: HOSPADM

## 2024-06-03 RX ADMIN — METRONIDAZOLE 500 MG: 500 TABLET ORAL at 14:25

## 2024-06-03 RX ADMIN — SODIUM CHLORIDE 1 G: 1 TABLET ORAL at 20:30

## 2024-06-03 RX ADMIN — DICLOFENAC SODIUM 2 G: 10 GEL TOPICAL at 17:12

## 2024-06-03 RX ADMIN — INSULIN LISPRO 1 UNITS: 100 INJECTION, SOLUTION INTRAVENOUS; SUBCUTANEOUS at 17:11

## 2024-06-03 RX ADMIN — CEFTRIAXONE SODIUM 1000 MG: 10 INJECTION, POWDER, FOR SOLUTION INTRAVENOUS at 04:26

## 2024-06-03 RX ADMIN — ACETAMINOPHEN 975 MG: 325 TABLET, FILM COATED ORAL at 14:25

## 2024-06-03 RX ADMIN — DICLOFENAC SODIUM 2 G: 10 GEL TOPICAL at 21:07

## 2024-06-03 RX ADMIN — ASPIRIN 81 MG CHEWABLE TABLET 81 MG: 81 TABLET CHEWABLE at 09:30

## 2024-06-03 RX ADMIN — IOHEXOL 85 ML: 350 INJECTION, SOLUTION INTRAVENOUS at 12:41

## 2024-06-03 RX ADMIN — ACETAMINOPHEN 975 MG: 325 TABLET, FILM COATED ORAL at 21:06

## 2024-06-03 RX ADMIN — INSULIN LISPRO 5 UNITS: 100 INJECTION, SOLUTION INTRAVENOUS; SUBCUTANEOUS at 12:00

## 2024-06-03 RX ADMIN — METRONIDAZOLE 500 MG: 500 TABLET ORAL at 21:06

## 2024-06-03 RX ADMIN — SPIRONOLACTONE 12.5 MG: 25 TABLET ORAL at 09:33

## 2024-06-03 RX ADMIN — DICLOFENAC SODIUM 2 G: 10 GEL TOPICAL at 11:59

## 2024-06-03 RX ADMIN — LISINOPRIL 5 MG: 5 TABLET ORAL at 09:33

## 2024-06-03 RX ADMIN — ENOXAPARIN SODIUM 40 MG: 40 INJECTION SUBCUTANEOUS at 09:29

## 2024-06-03 RX ADMIN — INSULIN LISPRO 5 UNITS: 100 INJECTION, SOLUTION INTRAVENOUS; SUBCUTANEOUS at 09:30

## 2024-06-03 RX ADMIN — INSULIN GLARGINE 32 UNITS: 100 INJECTION, SOLUTION SUBCUTANEOUS at 21:11

## 2024-06-03 RX ADMIN — SENNOSIDES, DOCUSATE SODIUM 1 TABLET: 8.6; 5 TABLET ORAL at 09:29

## 2024-06-03 RX ADMIN — INSULIN LISPRO 10 UNITS: 100 INJECTION, SOLUTION INTRAVENOUS; SUBCUTANEOUS at 17:12

## 2024-06-03 RX ADMIN — METHOCARBAMOL 500 MG: 500 TABLET ORAL at 05:25

## 2024-06-03 RX ADMIN — METHOCARBAMOL 500 MG: 500 TABLET ORAL at 21:06

## 2024-06-03 RX ADMIN — METHOCARBAMOL 500 MG: 500 TABLET ORAL at 14:25

## 2024-06-03 RX ADMIN — INSULIN LISPRO 3 UNITS: 100 INJECTION, SOLUTION INTRAVENOUS; SUBCUTANEOUS at 09:30

## 2024-06-03 RX ADMIN — DICLOFENAC SODIUM 2 G: 10 GEL TOPICAL at 09:34

## 2024-06-03 RX ADMIN — INSULIN LISPRO 3 UNITS: 100 INJECTION, SOLUTION INTRAVENOUS; SUBCUTANEOUS at 12:00

## 2024-06-03 RX ADMIN — POLYETHYLENE GLYCOL 3350 17 G: 17 POWDER, FOR SOLUTION ORAL at 09:29

## 2024-06-03 RX ADMIN — LIDOCAINE 5% 1 PATCH: 700 PATCH TOPICAL at 09:29

## 2024-06-03 RX ADMIN — SENNOSIDES, DOCUSATE SODIUM 1 TABLET: 8.6; 5 TABLET ORAL at 17:11

## 2024-06-03 RX ADMIN — ACETAMINOPHEN 975 MG: 325 TABLET, FILM COATED ORAL at 05:25

## 2024-06-03 RX ADMIN — METRONIDAZOLE 500 MG: 500 TABLET ORAL at 10:24

## 2024-06-03 NOTE — PLAN OF CARE
Problem: PAIN - ADULT  Goal: Verbalizes/displays adequate comfort level or baseline comfort level  Description: Interventions:  - Encourage patient to monitor pain and request assistance  - Assess pain using appropriate pain scale  - Administer analgesics based on type and severity of pain and evaluate response  - Implement non-pharmacological measures as appropriate and evaluate response  - Consider cultural and social influences on pain and pain management  - Notify physician/advanced practitioner if interventions unsuccessful or patient reports new pain  Outcome: Progressing     Problem: INFECTION - ADULT  Goal: Absence or prevention of progression during hospitalization  Description: INTERVENTIONS:  - Assess and monitor for signs and symptoms of infection  - Monitor lab/diagnostic results  - Monitor all insertion sites, i.e. indwelling lines, tubes, and drains  - Monitor endotracheal if appropriate and nasal secretions for changes in amount and color  - [place] appropriate cooling/warming therapies per order  - Administer medications as ordered  - Instruct and encourage patient and family to use good hand hygiene technique  - Identify and instruct in appropriate isolation precautions for identified infection/condition  Outcome: Progressing  Goal: Absence of fever/infection during neutropenic period  Description: INTERVENTIONS:  - Monitor WBC    Outcome: Progressing     Problem: SAFETY ADULT  Goal: Patient will remain free of falls  Description: INTERVENTIONS:  - Educate patient/family on patient safety including physical limitations  - Instruct patient to call for assistance with activity   - Consult OT/PT to assist with strengthening/mobility   - Keep Call bell within reach  - Keep bed low and locked with side rails adjusted as appropriate  - Keep care items and personal belongings within reach  - Initiate and maintain comfort rounds  - Make Fall Risk Sign visible to staff  - Offer Toileting every  Hours,  in advance of need  - Initiate/Maintain alarm  - Obtain necessary fall risk management equipment:   - Apply yellow socks and bracelet for high fall risk patients  - Consider moving patient to room near nurses station  Outcome: Progressing  Goal: Maintain or return to baseline ADL function  Description: INTERVENTIONS:  -  Assess patient's ability to carry out ADLs; assess patient's baseline for ADL function and identify physical deficits which impact ability to perform ADLs (bathing, care of mouth/teeth, toileting, grooming, dressing, etc.)  - Assess/evaluate cause of self-care deficits   - Assess range of motion  - Assess patient's mobility; develop plan if impaired  - Assess patient's need for assistive devices and provide as appropriate  - Encourage maximum independence but intervene and supervise when necessary  - Involve family in performance of ADLs  - Assess for home care needs following discharge   - Consider OT consult to assist with ADL evaluation and planning for discharge  - Provide patient education as appropriate  Outcome: Progressing  Goal: Maintains/Returns to pre admission functional level  Description: INTERVENTIONS:  - Perform AM-PAC 6 Click Basic Mobility/ Daily Activity assessment daily.  - Set and communicate daily mobility goal to care team and patient/family/caregiver.   - Collaborate with rehabilitation services on mobility goals if consulted  - Perform Range of Motion  times a day.  - Reposition patient every  hours.  - Dangle patient  times a day  - Stand patient  times a day  - Ambulate patient  times a day  - Out of bed to chair times a day   - Out of bed for meals  times a day  - Out of bed for toileting  - Record patient progress and toleration of activity level   Outcome: Progressing     Problem: DISCHARGE PLANNING  Goal: Discharge to home or other facility with appropriate resources  Description: INTERVENTIONS:  - Identify barriers to discharge w/patient and caregiver  - Arrange for  needed discharge resources and transportation as appropriate  - Identify discharge learning needs (meds, wound care, etc.)  - Arrange for interpretive services to assist at discharge as needed  - Refer to Case Management Department for coordinating discharge planning if the patient needs post-hospital services based on physician/advanced practitioner order or complex needs related to functional status, cognitive ability, or social support system  Outcome: Progressing     Problem: Knowledge Deficit  Goal: Patient/family/caregiver demonstrates understanding of disease process, treatment plan, medications, and discharge instructions  Description: Complete learning assessment and assess knowledge base.  Interventions:  - Provide teaching at level of understanding  - Provide teaching via preferred learning methods  Outcome: Progressing
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
## 2024-06-03 NOTE — ASSESSMENT & PLAN NOTE
Assessment:  Blood culture x 1 Bacteroides fragilis.  Reported having infected teeth for years.  Patient is afebrile.  Leukocytosis.  CT facial bones did not show any dental abscess.    Plan  ID consult appreciated.  Start Flagyl 500 mg every 8 hours.  Start ceftriaxone 2 g every 24 hours.  Per ID recommendations MRI of the lumbar spine.   AM CBC.  Monitor fever curve.

## 2024-06-03 NOTE — PLAN OF CARE
Problem: PAIN - ADULT  Goal: Verbalizes/displays adequate comfort level or baseline comfort level  Description: Interventions:  - Encourage patient to monitor pain and request assistance  - Assess pain using appropriate pain scale  - Administer analgesics based on type and severity of pain and evaluate response  - Implement non-pharmacological measures as appropriate and evaluate response  - Consider cultural and social influences on pain and pain management  - Notify physician/advanced practitioner if interventions unsuccessful or patient reports new pain  Outcome: Progressing     Problem: INFECTION - ADULT  Goal: Absence or prevention of progression during hospitalization  Description: INTERVENTIONS:  - Assess and monitor for signs and symptoms of infection  - Monitor lab/diagnostic results  - Monitor all insertion sites, i.e. indwelling lines, tubes, and drains  - Monitor endotracheal if appropriate and nasal secretions for changes in amount and color  - Remer appropriate cooling/warming therapies per order  - Administer medications as ordered  - Instruct and encourage patient and family to use good hand hygiene technique  - Identify and instruct in appropriate isolation precautions for identified infection/condition  Outcome: Progressing  Goal: Absence of fever/infection during neutropenic period  Description: INTERVENTIONS:  - Monitor WBC    Outcome: Progressing     Problem: SAFETY ADULT  Goal: Patient will remain free of falls  Description: INTERVENTIONS:  - Educate patient/family on patient safety including physical limitations  - Instruct patient to call for assistance with activity   - Consult OT/PT to assist with strengthening/mobility   - Keep Call bell within reach  - Keep bed low and locked with side rails adjusted as appropriate  - Keep care items and personal belongings within reach  - Initiate and maintain comfort rounds  - Make Fall Risk Sign visible to staff  - Offer Toileting every  Hours,  in advance of need  - Initiate/Maintain alarm  - Obtain necessary fall risk management equipment:   - Apply yellow socks and bracelet for high fall risk patients  - Consider moving patient to room near nurses station  Outcome: Progressing  Goal: Maintain or return to baseline ADL function  Description: INTERVENTIONS:  -  Assess patient's ability to carry out ADLs; assess patient's baseline for ADL function and identify physical deficits which impact ability to perform ADLs (bathing, care of mouth/teeth, toileting, grooming, dressing, etc.)  - Assess/evaluate cause of self-care deficits   - Assess range of motion  - Assess patient's mobility; develop plan if impaired  - Assess patient's need for assistive devices and provide as appropriate  - Encourage maximum independence but intervene and supervise when necessary  - Involve family in performance of ADLs  - Assess for home care needs following discharge   - Consider OT consult to assist with ADL evaluation and planning for discharge  - Provide patient education as appropriate  Outcome: Progressing  Goal: Maintains/Returns to pre admission functional level  Description: INTERVENTIONS:  - Perform AM-PAC 6 Click Basic Mobility/ Daily Activity assessment daily.  - Set and communicate daily mobility goal to care team and patient/family/caregiver.   - Collaborate with rehabilitation services on mobility goals if consulted  - Perform Range of Motion  times a day.  - Reposition patient every  hours.  - Dangle patient  times a day  - Stand patient  times a day  - Ambulate patient  times a day  - Out of bed to chair  times a day   - Out of bed for meals  times a day  - Out of bed for toileting  - Record patient progress and toleration of activity level   Outcome: Progressing     Problem: DISCHARGE PLANNING  Goal: Discharge to home or other facility with appropriate resources  Description: INTERVENTIONS:  - Identify barriers to discharge w/patient and caregiver  - Arrange for  needed discharge resources and transportation as appropriate  - Identify discharge learning needs (meds, wound care, etc.)  - Arrange for interpretive services to assist at discharge as needed  - Refer to Case Management Department for coordinating discharge planning if the patient needs post-hospital services based on physician/advanced practitioner order or complex needs related to functional status, cognitive ability, or social support system  Outcome: Progressing     Problem: Knowledge Deficit  Goal: Patient/family/caregiver demonstrates understanding of disease process, treatment plan, medications, and discharge instructions  Description: Complete learning assessment and assess knowledge base.  Interventions:  - Provide teaching at level of understanding  - Provide teaching via preferred learning methods  Outcome: Progressing

## 2024-06-04 ENCOUNTER — APPOINTMENT (INPATIENT)
Dept: NON INVASIVE DIAGNOSTICS | Facility: HOSPITAL | Age: 74
DRG: 095 | End: 2024-06-04
Payer: MEDICARE

## 2024-06-04 ENCOUNTER — APPOINTMENT (INPATIENT)
Dept: MRI IMAGING | Facility: HOSPITAL | Age: 74
DRG: 095 | End: 2024-06-04
Payer: MEDICARE

## 2024-06-04 LAB
ANION GAP SERPL CALCULATED.3IONS-SCNC: 6 MMOL/L (ref 4–13)
ANION GAP SERPL CALCULATED.3IONS-SCNC: 8 MMOL/L (ref 4–13)
ANION GAP SERPL CALCULATED.3IONS-SCNC: 8 MMOL/L (ref 4–13)
BUN SERPL-MCNC: 21 MG/DL (ref 5–25)
BUN SERPL-MCNC: 21 MG/DL (ref 5–25)
BUN SERPL-MCNC: 24 MG/DL (ref 5–25)
CALCIUM SERPL-MCNC: 8.6 MG/DL (ref 8.4–10.2)
CALCIUM SERPL-MCNC: 8.9 MG/DL (ref 8.4–10.2)
CALCIUM SERPL-MCNC: 9 MG/DL (ref 8.4–10.2)
CHLORIDE SERPL-SCNC: 98 MMOL/L (ref 96–108)
CHLORIDE SERPL-SCNC: 98 MMOL/L (ref 96–108)
CHLORIDE SERPL-SCNC: 99 MMOL/L (ref 96–108)
CO2 SERPL-SCNC: 21 MMOL/L (ref 21–32)
CO2 SERPL-SCNC: 22 MMOL/L (ref 21–32)
CO2 SERPL-SCNC: 24 MMOL/L (ref 21–32)
CREAT SERPL-MCNC: 1 MG/DL (ref 0.6–1.3)
CREAT SERPL-MCNC: 1.01 MG/DL (ref 0.6–1.3)
CREAT SERPL-MCNC: 1.12 MG/DL (ref 0.6–1.3)
ERYTHROCYTE [DISTWIDTH] IN BLOOD BY AUTOMATED COUNT: 13.6 % (ref 11.6–15.1)
GFR SERPL CREATININE-BSD FRML MDRD: 64 ML/MIN/1.73SQ M
GFR SERPL CREATININE-BSD FRML MDRD: 72 ML/MIN/1.73SQ M
GFR SERPL CREATININE-BSD FRML MDRD: 73 ML/MIN/1.73SQ M
GLUCOSE SERPL-MCNC: 124 MG/DL (ref 65–140)
GLUCOSE SERPL-MCNC: 134 MG/DL (ref 65–140)
GLUCOSE SERPL-MCNC: 147 MG/DL (ref 65–140)
GLUCOSE SERPL-MCNC: 154 MG/DL (ref 65–140)
GLUCOSE SERPL-MCNC: 194 MG/DL (ref 65–140)
GLUCOSE SERPL-MCNC: 197 MG/DL (ref 65–140)
GLUCOSE SERPL-MCNC: 233 MG/DL (ref 65–140)
HCT VFR BLD AUTO: 35.8 % (ref 36.5–49.3)
HGB BLD-MCNC: 11.8 G/DL (ref 12–17)
MCH RBC QN AUTO: 30.1 PG (ref 26.8–34.3)
MCHC RBC AUTO-ENTMCNC: 33 G/DL (ref 31.4–37.4)
MCV RBC AUTO: 91 FL (ref 82–98)
PLATELET # BLD AUTO: 377 THOUSANDS/UL (ref 149–390)
PMV BLD AUTO: 10.1 FL (ref 8.9–12.7)
POTASSIUM SERPL-SCNC: 4.1 MMOL/L (ref 3.5–5.3)
POTASSIUM SERPL-SCNC: 4.3 MMOL/L (ref 3.5–5.3)
POTASSIUM SERPL-SCNC: 4.4 MMOL/L (ref 3.5–5.3)
RBC # BLD AUTO: 3.92 MILLION/UL (ref 3.88–5.62)
SODIUM SERPL-SCNC: 127 MMOL/L (ref 135–147)
SODIUM SERPL-SCNC: 128 MMOL/L (ref 135–147)
SODIUM SERPL-SCNC: 129 MMOL/L (ref 135–147)
WBC # BLD AUTO: 8.38 THOUSAND/UL (ref 4.31–10.16)

## 2024-06-04 PROCEDURE — 85027 COMPLETE CBC AUTOMATED: CPT

## 2024-06-04 PROCEDURE — 72158 MRI LUMBAR SPINE W/O & W/DYE: CPT

## 2024-06-04 PROCEDURE — 72157 MRI CHEST SPINE W/O & W/DYE: CPT

## 2024-06-04 PROCEDURE — 99233 SBSQ HOSP IP/OBS HIGH 50: CPT | Performed by: INTERNAL MEDICINE

## 2024-06-04 PROCEDURE — 80048 BASIC METABOLIC PNL TOTAL CA: CPT

## 2024-06-04 PROCEDURE — 82948 REAGENT STRIP/BLOOD GLUCOSE: CPT

## 2024-06-04 RX ADMIN — SODIUM CHLORIDE 1 G: 1 TABLET ORAL at 12:24

## 2024-06-04 RX ADMIN — METHOCARBAMOL 500 MG: 500 TABLET ORAL at 12:25

## 2024-06-04 RX ADMIN — LIDOCAINE 5% 1 PATCH: 700 PATCH TOPICAL at 08:51

## 2024-06-04 RX ADMIN — METRONIDAZOLE 500 MG: 500 TABLET ORAL at 21:59

## 2024-06-04 RX ADMIN — DICLOFENAC SODIUM 2 G: 10 GEL TOPICAL at 17:00

## 2024-06-04 RX ADMIN — INSULIN LISPRO 2 UNITS: 100 INJECTION, SOLUTION INTRAVENOUS; SUBCUTANEOUS at 12:26

## 2024-06-04 RX ADMIN — INSULIN LISPRO 10 UNITS: 100 INJECTION, SOLUTION INTRAVENOUS; SUBCUTANEOUS at 12:27

## 2024-06-04 RX ADMIN — DICLOFENAC SODIUM 2 G: 10 GEL TOPICAL at 08:52

## 2024-06-04 RX ADMIN — ASPIRIN 81 MG CHEWABLE TABLET 81 MG: 81 TABLET CHEWABLE at 08:53

## 2024-06-04 RX ADMIN — ACETAMINOPHEN 975 MG: 325 TABLET, FILM COATED ORAL at 05:00

## 2024-06-04 RX ADMIN — ENOXAPARIN SODIUM 40 MG: 40 INJECTION SUBCUTANEOUS at 08:52

## 2024-06-04 RX ADMIN — DICLOFENAC SODIUM 2 G: 10 GEL TOPICAL at 12:25

## 2024-06-04 RX ADMIN — ACETAMINOPHEN 975 MG: 325 TABLET, FILM COATED ORAL at 21:59

## 2024-06-04 RX ADMIN — ACETAMINOPHEN 975 MG: 325 TABLET, FILM COATED ORAL at 12:25

## 2024-06-04 RX ADMIN — METHOCARBAMOL 500 MG: 500 TABLET ORAL at 05:00

## 2024-06-04 RX ADMIN — METRONIDAZOLE 500 MG: 500 TABLET ORAL at 12:25

## 2024-06-04 RX ADMIN — INSULIN LISPRO 10 UNITS: 100 INJECTION, SOLUTION INTRAVENOUS; SUBCUTANEOUS at 16:56

## 2024-06-04 RX ADMIN — DICLOFENAC SODIUM 2 G: 10 GEL TOPICAL at 21:57

## 2024-06-04 RX ADMIN — SENNOSIDES, DOCUSATE SODIUM 1 TABLET: 8.6; 5 TABLET ORAL at 08:52

## 2024-06-04 RX ADMIN — SODIUM CHLORIDE 1 G: 1 TABLET ORAL at 08:53

## 2024-06-04 RX ADMIN — INSULIN GLARGINE 32 UNITS: 100 INJECTION, SOLUTION SUBCUTANEOUS at 21:59

## 2024-06-04 RX ADMIN — INSULIN LISPRO 3 UNITS: 100 INJECTION, SOLUTION INTRAVENOUS; SUBCUTANEOUS at 21:57

## 2024-06-04 RX ADMIN — METRONIDAZOLE 500 MG: 500 TABLET ORAL at 05:00

## 2024-06-04 RX ADMIN — BISACODYL 10 MG: 10 SUPPOSITORY RECTAL at 08:52

## 2024-06-04 RX ADMIN — CEFTRIAXONE 2000 MG: 2 INJECTION, POWDER, FOR SOLUTION INTRAMUSCULAR; INTRAVENOUS at 03:39

## 2024-06-04 RX ADMIN — SODIUM CHLORIDE 1 G: 1 TABLET ORAL at 16:56

## 2024-06-04 RX ADMIN — METHOCARBAMOL 500 MG: 500 TABLET ORAL at 21:59

## 2024-06-04 RX ADMIN — POLYETHYLENE GLYCOL 3350 17 G: 17 POWDER, FOR SOLUTION ORAL at 08:52

## 2024-06-04 RX ADMIN — INSULIN LISPRO 10 UNITS: 100 INJECTION, SOLUTION INTRAVENOUS; SUBCUTANEOUS at 08:53

## 2024-06-04 NOTE — ASSESSMENT & PLAN NOTE
Assessment:  Blood culture x 1 Bacteroides fragilis.  Reported having infected teeth for years.  Patient is afebrile.  Leukocytosis.  CT facial bones did not show any dental abscess.    Plan  ID consult appreciated.  Discussed with OMFS for inpatient evaluation recommended outpatient follow-up given no abscess on his CT scan.  Continue Flagyl 500 mg every 8 hours.  Continue ceftriaxone 2 g every 24 hours.  Per ID recommendations MRI of the lumbar and thoracic spine to assess for OM.   Pending repeated Blood cx

## 2024-06-04 NOTE — ASSESSMENT & PLAN NOTE
Recent Labs     06/02/24  1248 06/03/24  0611 06/04/24  0444   WBC 12.29* 9.76 8.38       Patient reports he was started on Augmentin and was on day 3 for possible UTI.  UA negative on admission.  Leukocytosis in the setting of bacteremia.    Plan:  Antibiotics as bacteremia above.
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
## 2024-06-04 NOTE — PLAN OF CARE
Problem: PAIN - ADULT  Goal: Verbalizes/displays adequate comfort level or baseline comfort level  Description: Interventions:  - Encourage patient to monitor pain and request assistance  - Assess pain using appropriate pain scale  - Administer analgesics based on type and severity of pain and evaluate response  - Implement non-pharmacological measures as appropriate and evaluate response  - Consider cultural and social influences on pain and pain management  - Notify physician/advanced practitioner if interventions unsuccessful or patient reports new pain  Outcome: Progressing     Problem: INFECTION - ADULT  Goal: Absence or prevention of progression during hospitalization  Description: INTERVENTIONS:  - Assess and monitor for signs and symptoms of infection  - Monitor lab/diagnostic results  - Monitor all insertion sites, i.e. indwelling lines, tubes, and drains  - Monitor endotracheal if appropriate and nasal secretions for changes in amount and color  - [place] appropriate cooling/warming therapies per order  - Administer medications as ordered  - Instruct and encourage patient and family to use good hand hygiene technique  - Identify and instruct in appropriate isolation precautions for identified infection/condition  Outcome: Progressing  Goal: Absence of fever/infection during neutropenic period  Description: INTERVENTIONS:  - Monitor WBC    Outcome: Progressing     Problem: SAFETY ADULT  Goal: Patient will remain free of falls  Description: INTERVENTIONS:  - Educate patient/family on patient safety including physical limitations  - Instruct patient to call for assistance with activity   - Consult OT/PT to assist with strengthening/mobility   - Keep Call bell within reach  - Keep bed low and locked with side rails adjusted as appropriate  - Keep care items and personal belongings within reach  - Initiate and maintain comfort rounds  - Make Fall Risk Sign visible to staff  - Offer Toileting every ***  Hours, in advance of need  - Initiate/Maintain ***alarm  - Obtain necessary fall risk management equipment: ***  - Apply yellow socks and bracelet for high fall risk patients  - Consider moving patient to room near nurses station  Outcome: Progressing  Goal: Maintain or return to baseline ADL function  Description: INTERVENTIONS:  -  Assess patient's ability to carry out ADLs; assess patient's baseline for ADL function and identify physical deficits which impact ability to perform ADLs (bathing, care of mouth/teeth, toileting, grooming, dressing, etc.)  - Assess/evaluate cause of self-care deficits   - Assess range of motion  - Assess patient's mobility; develop plan if impaired  - Assess patient's need for assistive devices and provide as appropriate  - Encourage maximum independence but intervene and supervise when necessary  - Involve family in performance of ADLs  - Assess for home care needs following discharge   - Consider OT consult to assist with ADL evaluation and planning for discharge  - Provide patient education as appropriate  Outcome: Progressing  Goal: Maintains/Returns to pre admission functional level  Description: INTERVENTIONS:  - Perform AM-PAC 6 Click Basic Mobility/ Daily Activity assessment daily.  - Set and communicate daily mobility goal to care team and patient/family/caregiver.   - Collaborate with rehabilitation services on mobility goals if consulted  - Perform Range of Motion *** times a day.  - Reposition patient every *** hours.  - Dangle patient *** times a day  - Stand patient *** times a day  - Ambulate patient *** times a day  - Out of bed to chair *** times a day   - Out of bed for meals *** times a day  - Out of bed for toileting  - Record patient progress and toleration of activity level   Outcome: Progressing     Problem: DISCHARGE PLANNING  Goal: Discharge to home or other facility with appropriate resources  Description: INTERVENTIONS:  - Identify barriers to discharge  w/patient and caregiver  - Arrange for needed discharge resources and transportation as appropriate  - Identify discharge learning needs (meds, wound care, etc.)  - Arrange for interpretive services to assist at discharge as needed  - Refer to Case Management Department for coordinating discharge planning if the patient needs post-hospital services based on physician/advanced practitioner order or complex needs related to functional status, cognitive ability, or social support system  Outcome: Progressing     Problem: Knowledge Deficit  Goal: Patient/family/caregiver demonstrates understanding of disease process, treatment plan, medications, and discharge instructions  Description: Complete learning assessment and assess knowledge base.  Interventions:  - Provide teaching at level of understanding  - Provide teaching via preferred learning methods  Outcome: Progressing

## 2024-06-04 NOTE — ASSESSMENT & PLAN NOTE
Recent Labs     06/04/24  0014 06/04/24  0459 06/04/24  1210   SODIUM 127* 129* 128*      Assessment:  Reported decreased p.o. intake for the past few weeks.   corrected for hyperglycemia during admission.    Serum osmolality 282, urine osmolality 310, urine sodium 23.  Slowly improving sodium.    Plan:   BMP every 12 hours (goal sodium 133-135 tomorrow morning)   Diabetic diet with 1500 L fluid restriction.  Sodium tablets 1 g 3 times daily.

## 2024-06-04 NOTE — PLAN OF CARE
Problem: PAIN - ADULT  Goal: Verbalizes/displays adequate comfort level or baseline comfort level  Description: Interventions:  - Encourage patient to monitor pain and request assistance  - Assess pain using appropriate pain scale  - Administer analgesics based on type and severity of pain and evaluate response  - Implement non-pharmacological measures as appropriate and evaluate response  - Consider cultural and social influences on pain and pain management  - Notify physician/advanced practitioner if interventions unsuccessful or patient reports new pain  Outcome: Progressing     Problem: INFECTION - ADULT  Goal: Absence or prevention of progression during hospitalization  Description: INTERVENTIONS:  - Assess and monitor for signs and symptoms of infection  - Monitor lab/diagnostic results  - Monitor all insertion sites, i.e. indwelling lines, tubes, and drains  - Monitor endotracheal if appropriate and nasal secretions for changes in amount and color  - [place] appropriate cooling/warming therapies per order  - Administer medications as ordered  - Instruct and encourage patient and family to use good hand hygiene technique  - Identify and instruct in appropriate isolation precautions for identified infection/condition  Outcome: Progressing  Goal: Absence of fever/infection during neutropenic period  Description: INTERVENTIONS:  - Monitor WBC    Outcome: Progressing     Problem: SAFETY ADULT  Goal: Patient will remain free of falls  Description: INTERVENTIONS:  - Educate patient/family on patient safety including physical limitations  - Instruct patient to call for assistance with activity   - Consult OT/PT to assist with strengthening/mobility   - Keep Call bell within reach  - Keep bed low and locked with side rails adjusted as appropriate  - Keep care items and personal belongings within reach  - Initiate and maintain comfort rounds  - Make Fall Risk Sign visible to staff  - Offer Toileting every 2 Hours,  in advance of need  - Initiate/Maintain alarm  - Obtain necessary fall risk management equipment  - Apply yellow socks and bracelet for high fall risk patients  - Consider moving patient to room near nurses station  Outcome: Progressing  Goal: Maintain or return to baseline ADL function  Description: INTERVENTIONS:  -  Assess patient's ability to carry out ADLs; assess patient's baseline for ADL function and identify physical deficits which impact ability to perform ADLs (bathing, care of mouth/teeth, toileting, grooming, dressing, etc.)  - Assess/evaluate cause of self-care deficits   - Assess range of motion  - Assess patient's mobility; develop plan if impaired  - Assess patient's need for assistive devices and provide as appropriate  - Encourage maximum independence but intervene and supervise when necessary  - Involve family in performance of ADLs  - Assess for home care needs following discharge   - Consider OT consult to assist with ADL evaluation and planning for discharge  - Provide patient education as appropriate  Outcome: Progressing  Goal: Maintains/Returns to pre admission functional level  Description: INTERVENTIONS:  - Perform AM-PAC 6 Click Basic Mobility/ Daily Activity assessment daily.  - Set and communicate daily mobility goal to care team and patient/family/caregiver.   - Collaborate with rehabilitation services on mobility goals if consulted  - Perform Range of Motion 3 times a day.  - Reposition patient every 2 hours.  - Dangle patient 3 times a day  - Stand patient 3 times a day  - Ambulate patient 3 times a day  - Out of bed to chair 3 times a day   - Out of bed for meals 3 times a day  - Out of bed for toileting  - Record patient progress and toleration of activity level   Outcome: Progressing     Problem: DISCHARGE PLANNING  Goal: Discharge to home or other facility with appropriate resources  Description: INTERVENTIONS:  - Identify barriers to discharge w/patient and caregiver  - Arrange  for needed discharge resources and transportation as appropriate  - Identify discharge learning needs (meds, wound care, etc.)  - Arrange for interpretive services to assist at discharge as needed  - Refer to Case Management Department for coordinating discharge planning if the patient needs post-hospital services based on physician/advanced practitioner order or complex needs related to functional status, cognitive ability, or social support system  Outcome: Progressing     Problem: Knowledge Deficit  Goal: Patient/family/caregiver demonstrates understanding of disease process, treatment plan, medications, and discharge instructions  Description: Complete learning assessment and assess knowledge base.  Interventions:  - Provide teaching at level of understanding  - Provide teaching via preferred learning methods  Outcome: Progressing

## 2024-06-04 NOTE — ASSESSMENT & PLAN NOTE
Wt Readings from Last 3 Encounters:   06/01/24 94.3 kg (207 lb 14.3 oz)   01/14/22 96 kg (211 lb 10.3 oz)   08/26/21 99.8 kg (220 lb)     Assessment:  Echo January 2021: EF 25%, severe diffuse hypokinesis with regional variations. G2DD.  Home medications: Ramipril and spironolactone.  Patient is not volume overloaded on exam.    Plan:   Pending echocardiogram.  Sodium restriction 2g/day, Fluid restriction 2L/day  AM BMP  HOB > 30°, Daily standing weights, Measure I/O

## 2024-06-04 NOTE — ASSESSMENT & PLAN NOTE
Assessment:  Patient has had a significant decrease in appetite states he has not had a bowel movement in weeks.   6/1: CT abdomen pelvis: Dependent opacities in both lungs favoring combination of atelectasis and mild residual scarring and bronchiectasis. Diverticulosis without evidence for diverticulitis.   Had a bowel movement today after suppository.    Plan:  Bowel regimen: MiraLAX and senna. Dulcolax suppository as needed.
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
## 2024-06-04 NOTE — CASE MANAGEMENT
Case Management Assessment    Patient name Capo Pepper  Location W /W -01 MRN [id number]  : 1950 Date 2024       Current Admission Date: 2024  Current Admission Diagnosis:Hyponatremia   Patient Active Problem List    Diagnosis Date Noted Date Diagnosed    Bacteremia 2024     Leukocytosis 2024     Constipation 2024     Ambulatory dysfunction 2024     Acute hypoxemic respiratory failure due to COVID-19 (HCC) 2022     Hyponatremia 2022     Nontraumatic complete tear of right rotator cuff 2021     Biceps tendinopathy of right upper extremity 2021     Primary osteoarthritis of right knee 2021     Acute pain of right shoulder 2021     Dyspnea 2021     Stroke due to embolism of left middle cerebral artery (HCC) 2020     Type 2 diabetes mellitus ([facility]) 2020     Pleural effusion 2020     Cardiomyopathy (HCC) 2020     Coronary artery disease involving native coronary artery of native heart without angina pectoris 2020     Mixed hyperlipidemia 2020     Precordial pain 2016     Chronic low back pain 2016     Chronic combined systolic and diastolic congestive heart failure (HCC) 2016     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HCC) 2016     EKG, abnormal 2016       LOS (days): 3  Geometric Mean LOS (GMLOS) (days): 2.6  Days to GMLOS:-0.3     OBJECTIVE:    Risk of Unplanned Readmission Score: 10.49         Current admission status: Inpatient       Preferred Pharmacy:   [id number] [place] [facility]  [address]  [place] [address]  Phone: [phone] Fax: [phone]    Primary Care Provider: Vishal Arango MD    Primary Insurance: MEDICARE  Secondary Insurance: AETNA    ASSESSMENT:  Active Health Care Proxies       Marium Pepper [facility] Care Representative - Spouse   Primary Phone: [phone] (Mobile)  Home Phone: [phone]                     Readmission Root Cause  30 Day Readmission: No    Patient Information  Admitted from:: Home  Mental Status: Alert  During Assessment patient was accompanied by: Not accompanied during assessment  Assessment information provided by:: Patient  Primary Caregiver: Self  Support Systems: Spouse/significant other, Family members  County of Residence: [place]  What city do you live in?: [place]  Home entry access options. Select all that apply.: Stairs  Number of steps to enter home.: 3  Type of Current Residence: 2 story home  Upon entering residence, is there a bedroom on the main floor (no further steps)?: Yes  Upon entering residence, is there a bathroom on the main floor (no further steps)?: Yes  Living Arrangements: Lives w/ Spouse/significant other    Activities of Daily Living Prior to Admission  Functional Status: Independent  Completes ADLs independently?: Yes  Ambulates independently?: Yes  Does patient use assisted devices?: No  Does patient currently own DME?: Yes  What DME does the patient currently own?: [employer]  Does patient have a history of Outpatient Therapy (PT/OT)?: Yes  Does the patient have a history of Short-Term Rehab?: No  Does patient have a history of HHC?: No  Does patient currently have HHC?: No     Patient Information Continued  Income Source: Pension/group home  Does patient have prescription coverage?: Yes  Does patient receive dialysis treatments?: No  Does patient have a history of substance abuse?: No  Does patient have a history of Mental Health Diagnosis?: No  Has patient received inpatient treatment related to mental health in the last 2 years?: No     Means of Transportation  Means of Transport to Appts:: Drives Self      Social Determinants of Health (SDOH)      Flowsheet Row Most Recent Value   Housing Stability    In the last 12 months, was there a time when you were not able to pay the mortgage or rent on time? N   At any time in the past 12 months, were you homeless  or living in a shelter (including now)? N   Transportation Needs    In the past 12 months, has lack of transportation kept you from medical appointments or from getting medications? no   In the past 12 months, has lack of transportation kept you from meetings, work, or from getting things needed for daily living? No   Food Insecurity    Within the past 12 months, you worried that your food would run out before you got the money to buy more. Never true   Within the past 12 months, the food you bought just didn't last and you didn't have money to get more. Never true   Utilities    In the past 12 months has the electric, gas, oil, or water company threatened to shut off services in your home? No          CM reviewed the availability of treatment team to discuss questions or concerns patient and/or family may have regarding  understanding medications and recognizing signs and symptoms at discharge.  CM also encouraged patient to follow up with all recommended appointments after discharge. CM reviewed the information that will be provided to pt/family on the discharge instructions.  Patient advised of importance for patient and family to participate in managing patient's medical well being.

## 2024-06-04 NOTE — ASSESSMENT & PLAN NOTE
Lab Results   Component Value Date    HGBA1C 9.4 (H) 06/01/2024       Recent Labs     06/03/24  1644 06/03/24  2108 06/04/24  0726 06/04/24  1136   POCGLU 183* 147* 147* 197*         Blood Sugar Average: Last 72 hrs:  (P) [id number]    Home Medications: Metformin, unclear if patient is taking it.  6/1: A1c is 9.4    Plan:  SSI while inpatient  Continue Lantus 32 units, qhs   Continue Humalog 10 units,TID with meals   [facility]  Hypoglycemia protocol.

## 2024-06-05 ENCOUNTER — APPOINTMENT (INPATIENT)
Dept: NON INVASIVE DIAGNOSTICS | Facility: HOSPITAL | Age: 74
DRG: 095 | End: 2024-06-05
Payer: MEDICARE

## 2024-06-05 PROBLEM — K59.00 CONSTIPATION: Status: RESOLVED | Noted: 2024-06-01 | Resolved: 2024-06-05

## 2024-06-05 LAB
ANION GAP SERPL CALCULATED.3IONS-SCNC: 8 MMOL/L (ref 4–13)
ANION GAP SERPL CALCULATED.3IONS-SCNC: 8 MMOL/L (ref 4–13)
AORTIC ROOT: 3.2 CM
APICAL FOUR CHAMBER EJECTION FRACTION: 53 %
ASCENDING AORTA: 3.9 CM
BSA FOR ECHO PROCEDURE: 2.12 M2
BUN SERPL-MCNC: 18 MG/DL (ref 5–25)
BUN SERPL-MCNC: 19 MG/DL (ref 5–25)
CALCIUM SERPL-MCNC: 8.4 MG/DL (ref 8.4–10.2)
CALCIUM SERPL-MCNC: 9.1 MG/DL (ref 8.4–10.2)
CHLORIDE SERPL-SCNC: 100 MMOL/L (ref 96–108)
CHLORIDE SERPL-SCNC: 98 MMOL/L (ref 96–108)
CO2 SERPL-SCNC: 23 MMOL/L (ref 21–32)
CO2 SERPL-SCNC: 23 MMOL/L (ref 21–32)
CREAT SERPL-MCNC: 1.06 MG/DL (ref 0.6–1.3)
CREAT SERPL-MCNC: 1.1 MG/DL (ref 0.6–1.3)
CRP SERPL QL: 79.5 MG/L
ERYTHROCYTE [SEDIMENTATION RATE] IN BLOOD: 62 MM/HOUR (ref 0–19)
FRACTIONAL SHORTENING: 25 (ref 28–44)
GFR SERPL CREATININE-BSD FRML MDRD: 65 ML/MIN/1.73SQ M
GFR SERPL CREATININE-BSD FRML MDRD: 68 ML/MIN/1.73SQ M
GLUCOSE SERPL-MCNC: 122 MG/DL (ref 65–140)
GLUCOSE SERPL-MCNC: 132 MG/DL (ref 65–140)
GLUCOSE SERPL-MCNC: 133 MG/DL (ref 65–140)
GLUCOSE SERPL-MCNC: 147 MG/DL (ref 65–140)
GLUCOSE SERPL-MCNC: 153 MG/DL (ref 65–140)
GLUCOSE SERPL-MCNC: 163 MG/DL (ref 65–140)
INTERVENTRICULAR SEPTUM IN DIASTOLE (PARASTERNAL SHORT AXIS VIEW): 1.1 CM
INTERVENTRICULAR SEPTUM: 1.1 CM (ref 0.6–1.1)
LAAS-AP2: 12.7 CM2
LAAS-AP4: 16.5 CM2
LEFT ATRIUM AREA SYSTOLE SINGLE PLANE A4C: 16.9 CM2
LEFT ATRIUM SIZE: 3.8 CM
LEFT ATRIUM VOLUME (MOD BIPLANE): 38 ML
LEFT ATRIUM VOLUME INDEX (MOD BIPLANE): 17.9 ML/M2
LEFT INTERNAL DIMENSION IN SYSTOLE: 4.2 CM (ref 2.1–4)
LEFT VENTRICULAR INTERNAL DIMENSION IN DIASTOLE: 5.6 CM (ref 3.5–6)
LEFT VENTRICULAR POSTERIOR WALL IN END DIASTOLE: 1.1 CM
LEFT VENTRICULAR STROKE VOLUME: 75 ML
LVSV (TEICH): 75 ML
MV E'TISSUE VEL-SEP: 5 CM/S
POTASSIUM SERPL-SCNC: 4 MMOL/L (ref 3.5–5.3)
POTASSIUM SERPL-SCNC: 4.3 MMOL/L (ref 3.5–5.3)
RIGHT ATRIUM AREA SYSTOLE A4C: 14.1 CM2
RIGHT VENTRICLE ID DIMENSION: 4.3 CM
SL CV LEFT ATRIUM LENGTH A2C: 4.2 CM
SL CV LV EF: 55
SL CV PED ECHO LEFT VENTRICLE DIASTOLIC VOLUME (MOD BIPLANE) 2D: 156 ML
SL CV PED ECHO LEFT VENTRICLE SYSTOLIC VOLUME (MOD BIPLANE) 2D: 81 ML
SODIUM SERPL-SCNC: 129 MMOL/L (ref 135–147)
SODIUM SERPL-SCNC: 131 MMOL/L (ref 135–147)
TRICUSPID ANNULAR PLANE SYSTOLIC EXCURSION: 3.1 CM

## 2024-06-05 PROCEDURE — 86140 C-REACTIVE PROTEIN: CPT | Performed by: INTERNAL MEDICINE

## 2024-06-05 PROCEDURE — 99233 SBSQ HOSP IP/OBS HIGH 50: CPT | Performed by: INTERNAL MEDICINE

## 2024-06-05 PROCEDURE — 93306 TTE W/DOPPLER COMPLETE: CPT

## 2024-06-05 PROCEDURE — 99222 1ST HOSP IP/OBS MODERATE 55: CPT | Performed by: DENTIST

## 2024-06-05 PROCEDURE — 80048 BASIC METABOLIC PNL TOTAL CA: CPT

## 2024-06-05 PROCEDURE — 93306 TTE W/DOPPLER COMPLETE: CPT | Performed by: INTERNAL MEDICINE

## 2024-06-05 PROCEDURE — A9585 GADOBUTROL INJECTION: HCPCS | Performed by: INTERNAL MEDICINE

## 2024-06-05 PROCEDURE — 99232 SBSQ HOSP IP/OBS MODERATE 35: CPT | Performed by: INTERNAL MEDICINE

## 2024-06-05 PROCEDURE — 82948 REAGENT STRIP/BLOOD GLUCOSE: CPT

## 2024-06-05 PROCEDURE — 99223 1ST HOSP IP/OBS HIGH 75: CPT | Performed by: SPECIALIST

## 2024-06-05 PROCEDURE — 80048 BASIC METABOLIC PNL TOTAL CA: CPT | Performed by: INTERNAL MEDICINE

## 2024-06-05 PROCEDURE — 85652 RBC SED RATE AUTOMATED: CPT | Performed by: INTERNAL MEDICINE

## 2024-06-05 RX ORDER — OXYCODONE HCL 5 MG/5 ML
2.5 SOLUTION, ORAL ORAL EVERY 4 HOURS PRN
Status: DISCONTINUED | OUTPATIENT
Start: 2024-06-05 | End: 2024-06-05

## 2024-06-05 RX ORDER — HYDROMORPHONE HCL IN WATER/PF 6 MG/30 ML
0.2 PATIENT CONTROLLED ANALGESIA SYRINGE INTRAVENOUS EVERY 6 HOURS PRN
Status: DISCONTINUED | OUTPATIENT
Start: 2024-06-05 | End: 2024-06-05

## 2024-06-05 RX ORDER — GADOBUTROL 604.72 MG/ML
9 INJECTION INTRAVENOUS
Status: COMPLETED | OUTPATIENT
Start: 2024-06-05 | End: 2024-06-05

## 2024-06-05 RX ORDER — OXYCODONE HCL 5 MG/5 ML
5 SOLUTION, ORAL ORAL EVERY 4 HOURS PRN
Status: DISCONTINUED | OUTPATIENT
Start: 2024-06-05 | End: 2024-06-05

## 2024-06-05 RX ORDER — IBUPROFEN 600 MG/1
600 TABLET ORAL ONCE
Status: COMPLETED | OUTPATIENT
Start: 2024-06-05 | End: 2024-06-05

## 2024-06-05 RX ADMIN — SODIUM CHLORIDE 1 G: 1 TABLET ORAL at 11:45

## 2024-06-05 RX ADMIN — METRONIDAZOLE 500 MG: 500 TABLET ORAL at 05:24

## 2024-06-05 RX ADMIN — ASPIRIN 81 MG CHEWABLE TABLET 81 MG: 81 TABLET CHEWABLE at 08:27

## 2024-06-05 RX ADMIN — SENNOSIDES, DOCUSATE SODIUM 1 TABLET: 8.6; 5 TABLET ORAL at 08:27

## 2024-06-05 RX ADMIN — INSULIN GLARGINE 32 UNITS: 100 INJECTION, SOLUTION SUBCUTANEOUS at 21:05

## 2024-06-05 RX ADMIN — DICLOFENAC SODIUM 2 G: 10 GEL TOPICAL at 17:02

## 2024-06-05 RX ADMIN — SODIUM CHLORIDE 1 G: 1 TABLET ORAL at 08:27

## 2024-06-05 RX ADMIN — ACETAMINOPHEN 975 MG: 325 TABLET, FILM COATED ORAL at 21:01

## 2024-06-05 RX ADMIN — METRONIDAZOLE 500 MG: 500 TABLET ORAL at 21:01

## 2024-06-05 RX ADMIN — INSULIN LISPRO 10 UNITS: 100 INJECTION, SOLUTION INTRAVENOUS; SUBCUTANEOUS at 08:30

## 2024-06-05 RX ADMIN — INSULIN LISPRO 10 UNITS: 100 INJECTION, SOLUTION INTRAVENOUS; SUBCUTANEOUS at 11:44

## 2024-06-05 RX ADMIN — ENOXAPARIN SODIUM 40 MG: 40 INJECTION SUBCUTANEOUS at 08:27

## 2024-06-05 RX ADMIN — INSULIN LISPRO 1 UNITS: 100 INJECTION, SOLUTION INTRAVENOUS; SUBCUTANEOUS at 11:44

## 2024-06-05 RX ADMIN — CEFTRIAXONE 2000 MG: 2 INJECTION, POWDER, FOR SOLUTION INTRAMUSCULAR; INTRAVENOUS at 02:02

## 2024-06-05 RX ADMIN — METRONIDAZOLE 500 MG: 500 TABLET ORAL at 13:00

## 2024-06-05 RX ADMIN — METHOCARBAMOL 500 MG: 500 TABLET ORAL at 21:01

## 2024-06-05 RX ADMIN — INSULIN LISPRO 10 UNITS: 100 INJECTION, SOLUTION INTRAVENOUS; SUBCUTANEOUS at 17:02

## 2024-06-05 RX ADMIN — GADOBUTROL 9 ML: 604.72 INJECTION INTRAVENOUS at 00:34

## 2024-06-05 RX ADMIN — METHOCARBAMOL 500 MG: 500 TABLET ORAL at 05:24

## 2024-06-05 RX ADMIN — IBUPROFEN 600 MG: 600 TABLET, FILM COATED ORAL at 02:02

## 2024-06-05 RX ADMIN — SENNOSIDES, DOCUSATE SODIUM 1 TABLET: 8.6; 5 TABLET ORAL at 17:04

## 2024-06-05 RX ADMIN — DICLOFENAC SODIUM 2 G: 10 GEL TOPICAL at 21:02

## 2024-06-05 RX ADMIN — METHOCARBAMOL 500 MG: 500 TABLET ORAL at 13:00

## 2024-06-05 RX ADMIN — DICLOFENAC SODIUM 2 G: 10 GEL TOPICAL at 08:31

## 2024-06-05 RX ADMIN — DICLOFENAC SODIUM 2 G: 10 GEL TOPICAL at 11:45

## 2024-06-05 NOTE — ASSESSMENT & PLAN NOTE
Recent Labs     06/03/24  0611 06/04/24  0444   WBC 9.76 8.38       Patient reports he was started on Augmentin and was on day 3 for possible UTI.  UA negative on admission.  Leukocytosis in the setting of bacteremia.    Plan:  Antibiotics as bacteremia above.

## 2024-06-05 NOTE — PHYSICAL THERAPY NOTE
Physical Therapy Screen    Patient Name: Capo Pepper    Today's Date: 6/5/2024     Problem List  Principal Problem:    Hyponatremia  Active Problems:    Chronic low back pain    Chronic combined systolic and diastolic congestive heart failure (HCC)    Type 2 diabetes mellitus (HCC)    Constipation    Ambulatory dysfunction    Leukocytosis    Bacteremia       Past Medical History  Past Medical History:   Diagnosis Date    Atypical chest pain     Back pain     CHF (congestive heart failure) (HCC)     Legionnaire's disease (HCC)     Lung disorder     Migraine         Past Surgical History  Past Surgical History:   Procedure Laterality Date    ROTATOR CUFF REPAIR Bilateral     TONSILLECTOMY      TRIGGER FINGER RELEASE           06/05/24 1303   Note Type   Note type Screen   Additional Comments PT orders received. Chart reviewed. Per conversation with OT, pt has been independently ambulating in room with use of SPC, which he utilizes at baseline. Pt has been ambulating with steady gait, denies questions/concerns over current mobility status. Pt denies concern over neavigiating home environment upon discharge. Given this, pt will be a screen from PT caseload. Please place new orders if there is a change in status.     Pravin Alejandro, PT

## 2024-06-05 NOTE — CONSULTS
Patient Name: Capo Pepper YOB: 1950    Medical Record No.: [id number]     Admit/Registration Date: 2024  1:43 PM  Date of Consult: 24      Oral and Maxillofacial Surgery Consult Note    Assessment:  74 y.o. male with bacteroides bacteremia. Clinical exam and exam not concerning for odontogenic source of bacteremia. Patient can f/u for extractions as outpatient.   OMFS signing off please reconsult as needed     Plan/Recs:  Follow up with [facility]. In 1-2 weeks Call [phone] for an appointment.  [address]  [address] [place] [place] [address]        -----------------------------------------    Chief Complaint:  Back pain    HPI:  74 y.o. male who presents with acute Om of the spine found to have bacteremia growing non oral kip. Patient has had 4 years of mild chronic dental pain of the lower right quadrant. Patient states pain is 4/10 at its worse when he eats. Patient not interested in extractions at this time    Pre-admission records reviewed. PMH/PSH/Meds/Allergies Reviewed.    Past Medical History:   Diagnosis Date    Atypical chest pain     Back pain     CHF (congestive heart failure) ([facility])     Constipation 2024    Legionnaire's disease (HCC)     Lung disorder     Migraine      Past Surgical History:   Procedure Laterality Date    ROTATOR CUFF REPAIR Bilateral     TONSILLECTOMY      TRIGGER FINGER RELEASE         Allergies   Allergen Reactions    Poultry Meal - Food Allergy        Social History     Socioeconomic History    Marital status: /Civil Union     Spouse name: Not on file    Number of children: Not on file    Years of education: Not on file    Highest education level: Not on file   Occupational History    Not on file   Tobacco Use    Smoking status: Former     Current packs/day: 0.00     Average packs/day: 1 pack/day for 25.0 years (25.0 ttl pk-yrs)     Types: Cigarettes, Cigars     Start date:      Quit date: 2000     Years since quittin.4     Smokeless tobacco: Never   Vaping Use    Vaping status: Never Used   Substance and Sexual Activity    Alcohol use: Yes    Drug use: Not Currently     Comment: CBD Oil pt stated no longer using    Sexual activity: Not on file   Other Topics Concern    Not on file   Social History Narrative    Not on file     Social Determinants of Health     Financial Resource Strain: Not on file   Food Insecurity: No Food Insecurity (6/4/2024)    Hunger Vital Sign     Worried About Running Out of Food in the Last Year: Never true     Ran Out of Food in the Last Year: Never true   Transportation Needs: No Transportation Needs (6/4/2024)    PRAPARE - Transportation     Lack of Transportation (Medical): No     Lack of Transportation (Non-Medical): No   Physical Activity: Not on file   Stress: Not on file   Social Connections: Not on file   Intimate Partner Violence: Not on file   Housing Stability: Unknown (6/4/2024)    Housing Stability Vital Sign     Unable to Pay for Housing in the Last Year: No     Number of Times Moved in the Last Year: Not on file     Homeless in the Last Year: No       Scheduled Medications  Current Facility-Administered Medications   Medication Dose Route Frequency Provider Last Rate    acetaminophen  975 mg Oral Q8H [facility] Farida Fritz MD      aspirin  81 mg Oral Daily Alee Hernandez MD      bisacodyl  10 mg Rectal Daily PRN Jayne Arango DO      cefTRIAXone  2,000 mg Intravenous Q24H Laurie Li PA-C 2,000 mg (06/05/24 0202)    Diclofenac Sodium  2 g Topical 4x Daily Jayne Arango, DO      enoxaparin  40 mg Subcutaneous Q24H [facility] Jayne Arango, DO      insulin glargine  32 Units Subcutaneous HS Debby Steven MD      insulin lispro  1-6 Units Subcutaneous 4x Daily (AC & HS) Jayne Arango DO      insulin lispro  10 Units Subcutaneous TID With Meals Debby Steven MD      lidocaine  1 patch Topical Daily Farida Fritz MD      methocarbamol  500 mg Oral Q8H [facility] Jayne  "DO Nba      metroNIDAZOLE  500 mg Oral Q8H TU eDbby Steven MD      polyethylene glycol  17 g Oral Daily Alee Hernandez MD      senna-docusate sodium  1 tablet Oral BID Jayne Arango DO         PRN Medications    bisacodyl    Medication Infusions       Review of Systems   Reason unable to perform ROS: defer to H&P.       Vitals:    Temp:  [97.9 °F (36.6 °C)-98.3 °F (36.8 °C)] 98.1 °F (36.7 °C)  HR:  [] 114  Resp:  [16-18] 18  BP: (115-128)/(65-78) 124/73  Wt Readings from Last 1 Encounters:   06/05/24 94.3 kg (207 lb 14.3 oz)     Ht Readings from Last 1 Encounters:   06/05/24 5' 10\" (1.778 m)     Body mass index is 29.83 kg/m².  Respiratory      Lab Data (Last 4 hours)      None           O2/Vent Data (Last 4 hours)      None                  Patient Lines/Drains/Airways Status       Active Airway       None                  I/O:  Current Diet Order:        Diet Orders   (From admission, onward)                 Start     Ordered    06/02/24 2028  Diet Arthur/CHO Controlled; Consistent Carbohydrate Diet Level 2 (5 carb servings/75 grams CHO/meal); Sodium 2 GM, Fluid Restriction 1500 ML  Diet effective now        References:    Adult Nutrition Support Algorithm    RD Therapeutic Diet Order Protocol   Question Answer Comment   Diet Type Arthur/CHO Controlled    Arthur/CHO Controlled Consistent Carbohydrate Diet Level 2 (5 carb servings/75 grams CHO/meal)    Other Restriction(s): Sodium 2 GM    Other Restriction(s): Fluid Restriction 1500 ML    RD to adjust diet per protocol? Yes        06/02/24 2027                     Intake/Output Summary (Last 24 hours) at 6/5/2024 1754  Last data filed at 6/5/2024 1709  Gross per 24 hour   Intake 480 ml   Output 1650 ml   Net -1170 ml       Labs:  Results from last 7 days   Lab Units 06/04/24  0444 06/03/24  0611 06/02/24  1248   WBC Thousand/uL 8.38 9.76 12.29*   HEMOGLOBIN g/dL 11.8* 11.9* 13.3   HEMATOCRIT % 35.8* 35.3* 39.2   PLATELETS Thousands/uL 377 368 381     Results " from last 7 days   Lab Units 06/05/24  0527 06/05/24  0202 06/04/24  1210   POTASSIUM mmol/L 4.0 4.3 4.3   CHLORIDE mmol/L 100 98 98   CO2 mmol/L 23 23 24   BUN mg/dL 19 18 21   CREATININE mg/dL 1.10 1.06 1.00   CALCIUM mg/dL 8.4 9.1 8.9         Results from last 7 days   Lab Units 06/05/24  0527   CRP mg/L 79.5*     Pain Management Panel           No data to display                  Imaging:   CT:   MRI lumbar spine w wo contrast   Final Result by Nikko Ivy MD (06/05 0903)      Findings suspicious for acute discitis/osteomyelitis at L2-L3, 7.0 cm ventral epidural abscess spanning from mid L1 to mid L3 levels, enhancing epidural phlegmonous change extending into left L2-L3 foramen, associated moderate canal stenosis at L2-L3    levels and mild-to-moderate left L2-L3 foraminal narrowing. Recommend evaluation by neurosurgery.      Mild intramuscular edema and enhancement in left psoas major and left erector spinae musculature, suspicious for infectious/inflammatory myositis      Multilevel degenerative changes of lumbar spine with varying degrees of canal stenosis (moderate L2-L3) and foraminal narrowing (moderate right L5-S1), as detailed above.         I personally discussed this study with RITA HEATON on 6/5/2024 9:02 AM.                  Workstation performed: [id number]         MRI thoracic spine w wo contrast   Final Result by Nikko Ivy MD (06/05 0835)      No acute abnormality of thoracic spine.      Mild multilevel degenerative changes of thoracic spine with multilevel minor canal stenosis, as detailed above.      Partially imaged trace bilateral pleural effusions with subsegmental atelectasis, similar to prior exam.      Same day MRI lumbar spine with and without contrast.            Workstation performed: [id number]         CT facial bones w contrast   Final Result by Efren Kohler MD (06/03 1441)      There is no suspicious drainable fluid/abscess collection.      Poor  dentition with multiple dental fillings and focal periapical lucency involving a left maxillary molar tooth.         Workstation performed: [id number]         CT recon only thoracolumbar (No Charge)   Final Result by Aishwarya Hall MD (06/01 1657)      No fracture or traumatic subluxation.               Workstation performed: [id number]         CT chest abdomen pelvis w contrast   Final Result by Aishwarya Hall MD (06/01 1652)   Dependent opacities in both lungs favoring combination of atelectasis and mild residual scarring and bronchiectasis.      Diverticulosis without evidence for diverticulitis.      No findings to suggest pyelonephritis or other acute renal abnormality.                           Workstation performed: [id number]         XR chest 1 view portable   Final Result by Syl Perez MD (06/01 1732)      No acute cardiopulmonary disease.            Workstation performed: [id number]         XR spine lumbar 2 or 3 views injury    (Results Pending)         Studies:   Echo: I have reviewed all pertinent information  Ekg: I have reviewed all pertinent information    Physical Exam:   General: Integment: skin warm and dry, patient is WD/WN, Voice quality: wnl, in NAD  Neuro Exam: AAO x 3, CN V,VII grossly intact, GCS: 15  Head: Normocephalic, no scalp lacerations or hematoma,    Face: No lacerations/Abrasions/Step Offs    Ears: Pinna wnl bilaterally, no otorrhea, hearing grossly intact,    Eyes/Periorbital: Extraocular movements intact, intercanthal distance wnl,    Nose: External nose symmetrical/no gross deformity, no nasal crepitus, no nasal septal hematoma, no rhinorrhea, no epistaxis, bilateral nares patent,    Oral Exam:Lips and mucosal surfaces wnl, floor of mouth is soft with no palpable masses, tongue protrusion is midline and has full range of motion, no pharyngeal edema or exudate   Dentalalveolar Exam: occlusion stable, KAMLA 40, lateral excursive movements  wnl, #30 slightly TTP, no vestibular swelling   Lymph/Neck Exam: Neck is soft, trachea is midline, no gross cervical lymphadenopathy bilaterally,    Musculoskeletal: Neck with FROM  Cardiovascular: regular rate and rhythm,    Respiratory: clear to auscultation, no wheezes, rales or rhonchi, symmetric chest rise,    Gastrointestinal: nondistended  Genitourinary: Defer   Extremities: No gross peripheral edema.     Roberto Garcia
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
## 2024-06-05 NOTE — ASSESSMENT & PLAN NOTE
Wt Readings from Last 3 Encounters:   06/05/24 94.3 kg (207 lb 14.3 oz)   01/14/22 96 kg (211 lb 10.3 oz)   08/26/21 99.8 kg (220 lb)     Assessment:  Echo January 2021: EF 25%, severe diffuse hypokinesis with regional variations. G2DD.  Home medications: Ramipril and spironolactone.  Patient is not volume overloaded on exam.    Plan:   Pending echocardiogram.  Sodium restriction 2g/day, Fluid restriction 2L/day  AM BMP  HOB > 30°, Daily standing weights, Measure I/O

## 2024-06-05 NOTE — PLAN OF CARE
Problem: PAIN - ADULT  Goal: Verbalizes/displays adequate comfort level or baseline comfort level  Description: Interventions:  - Encourage patient to monitor pain and request assistance  - Assess pain using appropriate pain scale  - Administer analgesics based on type and severity of pain and evaluate response  - Implement non-pharmacological measures as appropriate and evaluate response  - Consider cultural and social influences on pain and pain management  - Notify physician/advanced practitioner if interventions unsuccessful or patient reports new pain  Outcome: Progressing     Problem: INFECTION - ADULT  Goal: Absence or prevention of progression during hospitalization  Description: INTERVENTIONS:  - Assess and monitor for signs and symptoms of infection  - Monitor lab/diagnostic results  - Monitor all insertion sites, i.e. indwelling lines, tubes, and drains  - Monitor endotracheal if appropriate and nasal secretions for changes in amount and color  - [place] appropriate cooling/warming therapies per order  - Administer medications as ordered  - Instruct and encourage patient and family to use good hand hygiene technique  - Identify and instruct in appropriate isolation precautions for identified infection/condition  Outcome: Progressing  Goal: Absence of fever/infection during neutropenic period  Description: INTERVENTIONS:  - Monitor WBC    Outcome: Progressing     Problem: SAFETY ADULT  Goal: Patient will remain free of falls  Description: INTERVENTIONS:  - Educate patient/family on patient safety including physical limitations  - Instruct patient to call for assistance with activity   - Consult OT/PT to assist with strengthening/mobility   - Keep Call bell within reach  - Keep bed low and locked with side rails adjusted as appropriate  - Keep care items and personal belongings within reach  - Initiate and maintain comfort rounds  - Make Fall Risk Sign visible to staff  - Offer Toileting every  Hours,  in advance of need  - Initiate/Maintain alarm  - Obtain necessary fall risk management equipment:   - Apply yellow socks and bracelet for high fall risk patients  - Consider moving patient to room near nurses station  Outcome: Progressing  Goal: Maintain or return to baseline ADL function  Description: INTERVENTIONS:  -  Assess patient's ability to carry out ADLs; assess patient's baseline for ADL function and identify physical deficits which impact ability to perform ADLs (bathing, care of mouth/teeth, toileting, grooming, dressing, etc.)  - Assess/evaluate cause of self-care deficits   - Assess range of motion  - Assess patient's mobility; develop plan if impaired  - Assess patient's need for assistive devices and provide as appropriate  - Encourage maximum independence but intervene and supervise when necessary  - Involve family in performance of ADLs  - Assess for home care needs following discharge   - Consider OT consult to assist with ADL evaluation and planning for discharge  - Provide patient education as appropriate  Outcome: Progressing  Goal: Maintains/Returns to pre admission functional level  Description: INTERVENTIONS:  - Perform AM-PAC 6 Click Basic Mobility/ Daily Activity assessment daily.  - Set and communicate daily mobility goal to care team and patient/family/caregiver.   - Collaborate with rehabilitation services on mobility goals if consulted  - Perform Range of Motion  times a day.  - Reposition patient every  hours.  - Dangle patient  times a day  - Stand patient  times a day  - Ambulate patient  times a day  - Out of bed to chair  times a day   - Out of bed for meals  times a day  - Out of bed for toileting  - Record patient progress and toleration of activity level   Outcome: Progressing     Problem: DISCHARGE PLANNING  Goal: Discharge to home or other facility with appropriate resources  Description: INTERVENTIONS:  - Identify barriers to discharge w/patient and caregiver  - Arrange for  needed discharge resources and transportation as appropriate  - Identify discharge learning needs (meds, wound care, etc.)  - Arrange for interpretive services to assist at discharge as needed  - Refer to Case Management Department for coordinating discharge planning if the patient needs post-hospital services based on physician/advanced practitioner order or complex needs related to functional status, cognitive ability, or social support system  Outcome: Progressing     Problem: Knowledge Deficit  Goal: Patient/family/caregiver demonstrates understanding of disease process, treatment plan, medications, and discharge instructions  Description: Complete learning assessment and assess knowledge base.  Interventions:  - Provide teaching at level of understanding  - Provide teaching via preferred learning methods  Outcome: Progressing

## 2024-06-05 NOTE — ASSESSMENT & PLAN NOTE
Recent Labs     06/04/24  1210 06/05/24  0202 06/05/24  0527   SODIUM 128* 129* 131*        Assessment:  Reported decreased p.o. intake for the past few weeks.   corrected for hyperglycemia during admission.    Serum osmolality 282, urine osmolality 310, urine sodium 23.  Slowly improving sodium.    Plan:   AM BMP  Diabetic diet with 1500 L fluid restriction.  Discontinue Sodium tablets 1 g 3 times daily.
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
## 2024-06-05 NOTE — ASSESSMENT & PLAN NOTE
Assessment:  Blood culture x 1 Bacteroides fragilis.  Reported having infected teeth for years.  Patient is afebrile.  Leukocytosis.  CT facial bones did not show any dental abscess.  MRI: acute discitis/osteomyelitis at L2-L3, 7.0 cm ventral epidural abscess spanning from mid L1 to mid L3 levels, enhancing epidural phlegmonous change extending into left L2-L3 foramen, associated moderate canal stenosis at L2-L3 levels and mild-to-moderate left L2-L3 foraminal narrowing.     Plan  ID consult appreciated.  OMFS Consult   Neurosurgery consult   Continue Flagyl 500 mg every 8 hours.  Continue ceftriaxone 2 g every 24 hours.  Pending repeated Blood cx

## 2024-06-05 NOTE — OCCUPATIONAL THERAPY NOTE
Occupational Therapy Screen     Patient Name: Capo Pepper  Today's Date: 6/5/2024  Problem List  Principal Problem:    Hyponatremia  Active Problems:    Chronic low back pain    Chronic combined systolic and diastolic congestive heart failure (HCC)    Type 2 diabetes mellitus (HCC)    Constipation    Ambulatory dysfunction    Leukocytosis    Bacteremia        06/05/24 0835   OT Last Visit   OT Visit Date 06/05/24   Note Type   Note type Screen   Additional Comments OT orders received, chart reviewed. Spoke with pt at bedside who reports he has been (I) in the room for mobility and ADLs, use of SPC PRN (used at baseline). JOCELYNN Patrick at bedside reports same.  Denies any concerns re: functional status upon D/C. Reports support from family at home and accessible environment. no IP OT needs indicated, will D/C OT orders. Please reconsult for new concerns     Donna Ceron, OT

## 2024-06-05 NOTE — ASSESSMENT & PLAN NOTE
Lab Results   Component Value Date    HGBA1C 9.4 (H) 06/01/2024       Recent Labs     06/04/24  2152 06/05/24  0745 06/05/24  1136 06/05/24  1556   POCGLU 233* 133 163* 147*         Blood Sugar Average: Last 72 hrs:  (P) 197.9905730979553417    Home Medications: Metformin, unclear if patient is taking it.  6/1: A1c is 9.4    Plan:  SSI while inpatient  Continue Lantus 32 units, qhs   Continue Humalog 10 units,TID with meals   Glucose checks and Insulin correction [facility]  Hypoglycemia protocol.
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
## 2024-06-05 NOTE — ASSESSMENT & PLAN NOTE
Assessment:  Patient has had a significant decrease in appetite states he has not had a bowel movement in weeks.   6/1: CT abdomen pelvis: Dependent opacities in both lungs favoring combination of atelectasis and mild residual scarring and bronchiectasis. Diverticulosis without evidence for diverticulitis.   Had a bowel movement.     Plan:  Bowel regimen: MiraLAX and senna. Dulcolax suppository as needed.

## 2024-06-06 LAB
ANION GAP SERPL CALCULATED.3IONS-SCNC: 5 MMOL/L (ref 4–13)
BUN SERPL-MCNC: 14 MG/DL (ref 5–25)
CALCIUM SERPL-MCNC: 8.5 MG/DL (ref 8.4–10.2)
CHLORIDE SERPL-SCNC: 100 MMOL/L (ref 96–108)
CO2 SERPL-SCNC: 25 MMOL/L (ref 21–32)
CREAT SERPL-MCNC: 0.93 MG/DL (ref 0.6–1.3)
GFR SERPL CREATININE-BSD FRML MDRD: 80 ML/MIN/1.73SQ M
GLUCOSE SERPL-MCNC: 109 MG/DL (ref 65–140)
GLUCOSE SERPL-MCNC: 117 MG/DL (ref 65–140)
GLUCOSE SERPL-MCNC: 125 MG/DL (ref 65–140)
GLUCOSE SERPL-MCNC: 125 MG/DL (ref 65–140)
GLUCOSE SERPL-MCNC: 143 MG/DL (ref 65–140)
POTASSIUM SERPL-SCNC: 4 MMOL/L (ref 3.5–5.3)
SODIUM SERPL-SCNC: 130 MMOL/L (ref 135–147)

## 2024-06-06 PROCEDURE — 82948 REAGENT STRIP/BLOOD GLUCOSE: CPT

## 2024-06-06 PROCEDURE — 80048 BASIC METABOLIC PNL TOTAL CA: CPT | Performed by: INTERNAL MEDICINE

## 2024-06-06 PROCEDURE — 99232 SBSQ HOSP IP/OBS MODERATE 35: CPT | Performed by: INTERNAL MEDICINE

## 2024-06-06 PROCEDURE — 99233 SBSQ HOSP IP/OBS HIGH 50: CPT | Performed by: INTERNAL MEDICINE

## 2024-06-06 RX ORDER — METHOCARBAMOL 500 MG/1
500 TABLET, FILM COATED ORAL EVERY 6 HOURS PRN
Status: DISCONTINUED | OUTPATIENT
Start: 2024-06-06 | End: 2024-06-10 | Stop reason: HOSPADM

## 2024-06-06 RX ADMIN — DICLOFENAC SODIUM 2 G: 10 GEL TOPICAL at 09:15

## 2024-06-06 RX ADMIN — ACETAMINOPHEN 975 MG: 325 TABLET, FILM COATED ORAL at 22:23

## 2024-06-06 RX ADMIN — ACETAMINOPHEN 975 MG: 325 TABLET, FILM COATED ORAL at 15:47

## 2024-06-06 RX ADMIN — INSULIN LISPRO 10 UNITS: 100 INJECTION, SOLUTION INTRAVENOUS; SUBCUTANEOUS at 17:06

## 2024-06-06 RX ADMIN — ENOXAPARIN SODIUM 40 MG: 40 INJECTION SUBCUTANEOUS at 09:16

## 2024-06-06 RX ADMIN — DICLOFENAC SODIUM 2 G: 10 GEL TOPICAL at 12:16

## 2024-06-06 RX ADMIN — SENNOSIDES, DOCUSATE SODIUM 1 TABLET: 8.6; 5 TABLET ORAL at 09:17

## 2024-06-06 RX ADMIN — METRONIDAZOLE 500 MG: 500 TABLET ORAL at 15:47

## 2024-06-06 RX ADMIN — SENNOSIDES, DOCUSATE SODIUM 1 TABLET: 8.6; 5 TABLET ORAL at 17:07

## 2024-06-06 RX ADMIN — INSULIN GLARGINE 32 UNITS: 100 INJECTION, SOLUTION SUBCUTANEOUS at 22:25

## 2024-06-06 RX ADMIN — METRONIDAZOLE 500 MG: 500 TABLET ORAL at 05:30

## 2024-06-06 RX ADMIN — METRONIDAZOLE 500 MG: 500 TABLET ORAL at 22:23

## 2024-06-06 RX ADMIN — ACETAMINOPHEN 975 MG: 325 TABLET, FILM COATED ORAL at 05:29

## 2024-06-06 RX ADMIN — DICLOFENAC SODIUM 2 G: 10 GEL TOPICAL at 22:39

## 2024-06-06 RX ADMIN — ASPIRIN 81 MG CHEWABLE TABLET 81 MG: 81 TABLET CHEWABLE at 09:17

## 2024-06-06 RX ADMIN — INSULIN LISPRO 10 UNITS: 100 INJECTION, SOLUTION INTRAVENOUS; SUBCUTANEOUS at 12:16

## 2024-06-06 RX ADMIN — METHOCARBAMOL 500 MG: 500 TABLET ORAL at 22:23

## 2024-06-06 RX ADMIN — INSULIN LISPRO 10 UNITS: 100 INJECTION, SOLUTION INTRAVENOUS; SUBCUTANEOUS at 09:15

## 2024-06-06 RX ADMIN — DICLOFENAC SODIUM 2 G: 10 GEL TOPICAL at 17:07

## 2024-06-06 RX ADMIN — METHOCARBAMOL 500 MG: 500 TABLET ORAL at 05:30

## 2024-06-06 RX ADMIN — LIDOCAINE 5% 1 PATCH: 700 PATCH TOPICAL at 09:16

## 2024-06-06 RX ADMIN — CEFTRIAXONE 2000 MG: 2 INJECTION, POWDER, FOR SOLUTION INTRAMUSCULAR; INTRAVENOUS at 04:31
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
## 2024-06-06 NOTE — ASSESSMENT & PLAN NOTE
Assessment:  Blood culture x 1 Bacteroides fragilis.  Reported having infected teeth for years.  Patient is afebrile.  Leukocytosis.  CT facial bones did not show any dental abscess.  MRI: acute discitis/osteomyelitis at L2-L3, 7.0 cm ventral epidural abscess spanning from mid L1 to mid L3 levels, enhancing epidural phlegmonous change extending into left L2-L3 foramen, associated moderate canal stenosis at L2-L3 levels and mild-to-moderate left L2-L3 foraminal narrowing.     Plan  ID consult appreciated-will need a prolonged course of IV antibiotics.  OMFS Consult-outpatient follow-up.  Neurosurgery consult-recommended outpatient follow-up given no weakness at this point.  Continue Flagyl 500 mg every 8 hours.  Continue ceftriaxone 2 g every 24 hours.  Pending repeated Blood cx

## 2024-06-06 NOTE — ASSESSMENT & PLAN NOTE
Lab Results   Component Value Date    HGBA1C 9.4 (H) 06/01/2024       Recent Labs     06/05/24 2057 06/06/24  0803 06/06/24  1056 06/06/24  1609   POCGLU 122 125 109 125         Blood Sugar Average: Last 72 hrs:  (P) 165    Home Medications: Metformin, unclear if patient is taking it.  6/1: A1c is 9.4    Plan:  SSI while inpatient  Continue Lantus 32 units, qhs   Continue Humalog 10 units,TID with meals   Glucose checks and Insulin correction [facility]  Hypoglycemia protocol.
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
## 2024-06-06 NOTE — PLAN OF CARE
Problem: PAIN - ADULT  Goal: Verbalizes/displays adequate comfort level or baseline comfort level  Description: Interventions:  - Encourage patient to monitor pain and request assistance  - Assess pain using appropriate pain scale  - Administer analgesics based on type and severity of pain and evaluate response  - Implement non-pharmacological measures as appropriate and evaluate response  - Consider cultural and social influences on pain and pain management  - Notify physician/advanced practitioner if interventions unsuccessful or patient reports new pain  Outcome: Progressing     Problem: INFECTION - ADULT  Goal: Absence or prevention of progression during hospitalization  Description: INTERVENTIONS:  - Assess and monitor for signs and symptoms of infection  - Monitor lab/diagnostic results  - Monitor all insertion sites, i.e. indwelling lines, tubes, and drains  - Monitor endotracheal if appropriate and nasal secretions for changes in amount and color  - [place] appropriate cooling/warming therapies per order  - Administer medications as ordered  - Instruct and encourage patient and family to use good hand hygiene technique  - Identify and instruct in appropriate isolation precautions for identified infection/condition  Outcome: Progressing  Goal: Absence of fever/infection during neutropenic period  Description: INTERVENTIONS:  - Monitor WBC    Outcome: Progressing     Problem: SAFETY ADULT  Goal: Patient will remain free of falls  Description: INTERVENTIONS:  - Educate patient/family on patient safety including physical limitations  - Instruct patient to call for assistance with activity   - Consult OT/PT to assist with strengthening/mobility   - Keep Call bell within reach  - Keep bed low and locked with side rails adjusted as appropriate  - Keep care items and personal belongings within reach  - Initiate and maintain comfort rounds  - Make Fall Risk Sign visible to staff  - Offer Toileting every  Hours,  in advance of need  - Initiate/Maintain alarm  - Obtain necessary fall risk management equipment:   - Apply yellow socks and bracelet for high fall risk patients  - Consider moving patient to room near nurses station  Outcome: Progressing  Goal: Maintain or return to baseline ADL function  Description: INTERVENTIONS:  -  Assess patient's ability to carry out ADLs; assess patient's baseline for ADL function and identify physical deficits which impact ability to perform ADLs (bathing, care of mouth/teeth, toileting, grooming, dressing, etc.)  - Assess/evaluate cause of self-care deficits   - Assess range of motion  - Assess patient's mobility; develop plan if impaired  - Assess patient's need for assistive devices and provide as appropriate  - Encourage maximum independence but intervene and supervise when necessary  - Involve family in performance of ADLs  - Assess for home care needs following discharge   - Consider OT consult to assist with ADL evaluation and planning for discharge  - Provide patient education as appropriate  Outcome: Progressing  Goal: Maintains/Returns to pre admission functional level  Description: INTERVENTIONS:  - Perform AM-PAC 6 Click Basic Mobility/ Daily Activity assessment daily.  - Set and communicate daily mobility goal to care team and patient/family/caregiver.   - Collaborate with rehabilitation services on mobility goals if consulted  - Perform Range of Motion  times a day.  - Reposition patient every  hours.  - Dangle patient  times a day  - Stand patient  times a day  - Ambulate patient times a day  - Out of bed to chair  times a day   - Out of bed for meals times a day  - Out of bed for toileting  - Record patient progress and toleration of activity level   Outcome: Progressing     Problem: DISCHARGE PLANNING  Goal: Discharge to home or other facility with appropriate resources  Description: INTERVENTIONS:  - Identify barriers to discharge w/patient and caregiver  - Arrange for  needed discharge resources and transportation as appropriate  - Identify discharge learning needs (meds, wound care, etc.)  - Arrange for interpretive services to assist at discharge as needed  - Refer to Case Management Department for coordinating discharge planning if the patient needs post-hospital services based on physician/advanced practitioner order or complex needs related to functional status, cognitive ability, or social support system  Outcome: Progressing     Problem: Knowledge Deficit  Goal: Patient/family/caregiver demonstrates understanding of disease process, treatment plan, medications, and discharge instructions  Description: Complete learning assessment and assess knowledge base.  Interventions:  - Provide teaching at level of understanding  - Provide teaching via preferred learning methods  Outcome: Progressing

## 2024-06-06 NOTE — PLAN OF CARE
Problem: PAIN - ADULT  Goal: Verbalizes/displays adequate comfort level or baseline comfort level  Description: Interventions:  - Encourage patient to monitor pain and request assistance  - Assess pain using appropriate pain scale  - Administer analgesics based on type and severity of pain and evaluate response  - Implement non-pharmacological measures as appropriate and evaluate response  - Consider cultural and social influences on pain and pain management  - Notify physician/advanced practitioner if interventions unsuccessful or patient reports new pain  Outcome: Progressing     Problem: INFECTION - ADULT  Goal: Absence or prevention of progression during hospitalization  Description: INTERVENTIONS:  - Assess and monitor for signs and symptoms of infection  - Monitor lab/diagnostic results  - Monitor all insertion sites, i.e. indwelling lines, tubes, and drains  - Monitor endotracheal if appropriate and nasal secretions for changes in amount and color  - [place] appropriate cooling/warming therapies per order  - Administer medications as ordered  - Instruct and encourage patient and family to use good hand hygiene technique  - Identify and instruct in appropriate isolation precautions for identified infection/condition  Outcome: Progressing  Goal: Absence of fever/infection during neutropenic period  Description: INTERVENTIONS:  - Monitor WBC    Outcome: Progressing     Problem: SAFETY ADULT  Goal: Patient will remain free of falls  Description: INTERVENTIONS:  - Educate patient/family on patient safety including physical limitations  - Instruct patient to call for assistance with activity   - Consult OT/PT to assist with strengthening/mobility   - Keep Call bell within reach  - Keep bed low and locked with side rails adjusted as appropriate  - Keep care items and personal belongings within reach  - Initiate and maintain comfort rounds  - Make Fall Risk Sign visible to staff  - Offer Toileting every  Hours,  in advance of need  - Initiate/Maintain alarm  - Obtain necessary fall risk management equipment:   - Apply yellow socks and bracelet for high fall risk patients  - Consider moving patient to room near nurses station  Outcome: Progressing  Goal: Maintain or return to baseline ADL function  Description: INTERVENTIONS:  -  Assess patient's ability to carry out ADLs; assess patient's baseline for ADL function and identify physical deficits which impact ability to perform ADLs (bathing, care of mouth/teeth, toileting, grooming, dressing, etc.)  - Assess/evaluate cause of self-care deficits   - Assess range of motion  - Assess patient's mobility; develop plan if impaired  - Assess patient's need for assistive devices and provide as appropriate  - Encourage maximum independence but intervene and supervise when necessary  - Involve family in performance of ADLs  - Assess for home care needs following discharge   - Consider OT consult to assist with ADL evaluation and planning for discharge  - Provide patient education as appropriate  Outcome: Progressing  Goal: Maintains/Returns to pre admission functional level  Description: INTERVENTIONS:  - Perform AM-PAC 6 Click Basic Mobility/ Daily Activity assessment daily.  - Set and communicate daily mobility goal to care team and patient/family/caregiver.   - Collaborate with rehabilitation services on mobility goals if consulted  - Perform Range of Motion  times a day.  - Reposition patient every hours.  - Dangle patient  times a day  - Stand patient  times a day  - Ambulate patient  times a day  - Out of bed to chair  times a day   - Out of bed for meals  times a day  - Out of bed for toileting  - Record patient progress and toleration of activity level   Outcome: Progressing     Problem: DISCHARGE PLANNING  Goal: Discharge to home or other facility with appropriate resources  Description: INTERVENTIONS:  - Identify barriers to discharge w/patient and caregiver  - Arrange for  needed discharge resources and transportation as appropriate  - Identify discharge learning needs (meds, wound care, etc.)  - Arrange for interpretive services to assist at discharge as needed  - Refer to Case Management Department for coordinating discharge planning if the patient needs post-hospital services based on physician/advanced practitioner order or complex needs related to functional status, cognitive ability, or social support system  Outcome: Progressing     Problem: Knowledge Deficit  Goal: Patient/family/caregiver demonstrates understanding of disease process, treatment plan, medications, and discharge instructions  Description: Complete learning assessment and assess knowledge base.  Interventions:  - Provide teaching at level of understanding  - Provide teaching via preferred learning methods  Outcome: Progressing

## 2024-06-06 NOTE — PROGRESS NOTES
Progress Note - Infectious Disease   Capo Pepper 74 y.o. male MRN: [id number]  Unit/Bed#: W -01 Encounter: [id number]      Impression/Plan:  1.  Bacteroides bacteremia.  Patient initially presented for problem #2 and 1 of 2 blood cultures is now returned positive with Bacteroides.  Based on history, primary concern is for occult bacteremia from a dental source leading to secondary seeding of #2.  Patient without other acute findings on imaging of the abdomen and UA unremarkable.  CT of the face notable for dental issues and patient self reports drainage from one of his teeth intermittently.  Could consider occult GI source, patient without chronic or recurrent symptoms but he has never had colonoscopy.  Suspect #2 is a polymicrobial infection.  2D echo limited but grossly unremarkable.  Subsequent cultures remain without significant growth.  No plans for SHANELLE without other organisms isolated on blood cultures as it would not  currently.  No surgical interventions planned by neurosurgery.  Continue Ceftriaxone and flagyl  OMFS evaluation noted, follow-up outpatient  Neurosurgical evaluation noted, follow-up outpatient  Recommend GI evaluation outpatient for colonoscopy  Recommend IR evaluation for #2  Anticipate prolonged antibiotic course for #2  Continue to trend for curve/vitals  Repeat CBC/chemistry tomorrow to monitor treatment response/tolerance  Will provide tentative prescription to case management  Additional supportive cares per primary  Additional interventions pending clinical course     2.  Acute on chronic back pain and osteomyelitis of L2-3 with epidural abscess and myositis.  Patient has had chronic back pain since the 1970s.  He has been having worsening spasming now for the last 2 weeks.  Has overall been feeling unwell systemically now for 2 months.  Concern for secondary seeding of this site given the above.  MRI L-spine confirmed discitis/osteomyelitis at L2-3 with  epidural abscess and infectious myositis.  No plans for intervention per neurosurgery.  Will attempt IR evaluation given suspected polymicrobial process.  Antibiotics as above  Neurosurgical evaluation noted, follow-up outpatient  Recommend IR evaluation for deep culture specimen  Will provide tentative prescription for ceftriaxone/Flagyl today  Tentative plan for 6 weeks of therapy through 7/15  Will order baseline ESR/CRP with labs in the morning  Anticipate repeat imaging as outpatient to assess abscess resolution  Will arrange ID follow-up closer to discharge  Monitor neuroexam for now  Trend fever curve/WBC  Anticipate need for prolonged antibiotics     3.  Type 2 diabetes.  Hemoglobin A1c of 9.4.  Likely risk factor of the above.  Ongoing glucose management per primary     4.  Leukocytosis.  Noted on presentation and I suspect is due to the above.  Fortunately improving.  Continue antibiotics as above and follow-up repeat cultures.       Above plan discussed in detail with the patient and his wife at bedside  Above plan discussed in detail with primary service attending and residents who are aware of plans for current antibiotics, recommendation for IR aspiration and prolonged antibiotic course    ID consult service will continue to follow.    Antibiotics:  Ceftriaxone/Flagyl 3    24 Hour events:  Yesterday and overnight notes reviewed and no acute events noted    Subjective:  Patient seen at bedside and he denies having any nausea, vomiting, chest pain.  Back pain flared up today.  OMFS evaluation noted and plans are for evaluation and extraction as outpatient.  Reviewed case in detail with both the patient and his wife who was on the phone.  We discussed at this time IR evaluation for aspiration if possible and evaluating outpatient antibiotic options including home infusion in the infusion center.  We discussed extensively restrictions with PICC line, expectations for follow-up, expectations for compliance  with both infusion center and home VNA, duration of treatment, need for repeat imaging as an outpatient and possible extension of treatment course if abscesses have not resolved on imaging.  Patient also aware of the need for follow-up with OMFS as well as evaluation and being seen by GI.  He is requesting having a new primary care provider.  Relayed this information to primary service.  Overall back not feeling well today and was requesting heating pad which I did relay to primary service.  Let patient know that hopefully with IR cultures we can finalize antibiotics to transition home.  If IR biopsy is not obtained then we will plan for empiric course of treatment with current regimen.  Additional questions answered.    Objective:  Vitals:  Temp:  [97.9 °F (36.6 °C)-98.1 °F (36.7 °C)] 98.1 °F (36.7 °C)  HR:  [] 113  Resp:  [14-18] 14  BP: (124-130)/(73-77) 129/77  SpO2:  [92 %-94 %] 92 %  Temp (24hrs), Av °F (36.7 °C), Min:97.9 °F (36.6 °C), Max:98.1 °F (36.7 °C)  Current: Temperature: 98.1 °F (36.7 °C)    Physical Exam:   General Appearance:  Alert, interactive, nontoxic, no acute distress.   Throat: Oropharynx moist without lesions.    Lungs:   Clear to auscultation bilaterally; no wheezes, rhonchi or rales; respirations unlabored on room air   Heart:  RRR; no murmur, rub or gallop noted   Abdomen:   Soft, non-tender, non-distended, positive bowel sounds.     Extremities: No clubbing, cyanosis or edema   Skin: No new rashes or lesions. No new draining wounds noted.       Labs, Imaging, & Other studies:   All pertinent labs and imaging studies in PACS were personally reviewed as below.  Results from last 7 days   Lab Units 24  0444 24  0611 24  1248   WBC Thousand/uL 8.38 9.76 12.29*   HEMOGLOBIN g/dL 11.8* 11.9* 13.3   PLATELETS Thousands/uL 377 368 381     Results from last 7 days   Lab Units 24  0558 0624  1447   POTASSIUM mmol/L 4.0   < > 4.8   CHLORIDE  mmol/L 100   < > 92*   CO2 mmol/L 25   < > 21   BUN mg/dL 14   < > 24   CREATININE mg/dL 0.93   < > 1.05   EGFR ml/min/1.73sq m 80   < > 69   CALCIUM mg/dL 8.5   < > 8.5   AST U/L  --   --  21   ALT U/L  --   --  38   ALK PHOS U/L  --   --  79    < > = values in this interval not displayed.     Results from last 7 days   Lab Units 06/03/24  0940 06/01/24  1449 06/01/24  1447   BLOOD CULTURE  No Growth at 48 hrs.  No Growth at 48 hrs. No Growth After 4 Days. Bacteroides fragilis*  Bacteroides fragilis*   GRAM STAIN RESULT   --   --  Gram negative rods*       Lab interpretation/comments: No leukocytosis    Imaging interpretation/comments: Echo reviewed and significantly limited study.  Grossly unremarkable from what was seen.    Culture data: Blood cultures reviewed.  Repeat cultures without growth.

## 2024-06-06 NOTE — TELEMEDICINE
e-Consult ([facility])  - Interventional Radiology  Capo Pepper 74 y.o. male MRN: [id number]  Unit/Bed#: W -01 Encounter: [id number]          Interventional Radiology has been consulted to evaluate Capo Pepper        Inpatient Consult to IR  Consult performed by: Haydee Martinez MD  Consult ordered by: Sirena Kauffman MD        06/06/24    Assessment/Recommendation:   Bacteremia with discitis/osteomyelitis.    IR consulted for biopsy.    Plan to do L2/3 disc aspiration for aerobic, anaerobic, AFB and fungal cultures    Order placed.    Patient is on aspirin and requires at least 3-day hold.    Plan to do next Monday, 6/10/2024.    I have asked Dr. Meyer to discontinue aspirin and I have placed the prophylactic Lovenox on hold that morning.  N.p.o. order placed.        11-20 minutes, >50% of the total time devoted to medical consultative verbal/EMR discussion between providers. Written report will be generated in the EMR.     Thank you for allowing Interventional Radiology to participate in the care of Capo Pepper. Please don't hesitate to call or TigerText us with any questions.     Haydee Martinez MD

## 2024-06-06 NOTE — ASSESSMENT & PLAN NOTE
Recent Labs     06/05/24  0202 06/05/24  0527 06/06/24  0558   SODIUM 129* 131* 130*        Assessment:  Reported decreased p.o. intake for the past few weeks.   corrected for hyperglycemia during admission.    Serum osmolality 282, urine osmolality 310, urine sodium 23.  Slowly improving sodium.    Plan:   AM BMP  Diabetic diet with 1500 L fluid restriction.  Discontinue Sodium tablets 1 g 3 times daily.

## 2024-06-06 NOTE — ASSESSMENT & PLAN NOTE
Wt Readings from Last 3 Encounters:   06/05/24 94.3 kg (207 lb 14.3 oz)   01/14/22 96 kg (211 lb 10.3 oz)   08/26/21 99.8 kg (220 lb)     Assessment:  Echo January 2021: EF 25%, severe diffuse hypokinesis with regional variations. G2DD.  Home medications: Ramipril and spironolactone.  Patient is not volume overloaded on exam.  Echocardiogram this admission: EF 55%.  Normal systolic function. No evidence of vegetations.    Plan:   Pending echocardiogram.  Sodium restriction 2g/day, Fluid restriction 2L/day  AM BMP  HOB > 30°, Daily standing weights, Measure I/O

## 2024-06-06 NOTE — ASSESSMENT & PLAN NOTE
Patient has had worsening weakness with significant ambulatory dysfunction.  Decreased strength bilaterally to the lower extremities.  Patient does not use assistance with walking at baseline but does need to use a friend's walker.   Had recent fall at home where he states he fell asleep while on the toilet.    Plan:  PT/OT consulted-no rehab.  Fall precautions

## 2024-06-06 NOTE — ASSESSMENT & PLAN NOTE
Recent Labs     06/04/24  0444   WBC 8.38       Patient reports he was started on Augmentin and was on day 3 for possible UTI.  UA negative on admission.  Leukocytosis in the setting of bacteremia.    Plan:  Antibiotics as bacteremia above.

## 2024-06-07 ENCOUNTER — HOME HEALTH ADMISSION (OUTPATIENT)
Dept: HOME HEALTH SERVICES | Facility: HOME HEALTHCARE | Age: 74
End: 2024-06-07
Payer: MEDICARE

## 2024-06-07 LAB
ANION GAP SERPL CALCULATED.3IONS-SCNC: 6 MMOL/L (ref 4–13)
BACTERIA BLD CULT: NORMAL
BUN SERPL-MCNC: 13 MG/DL (ref 5–25)
CALCIUM SERPL-MCNC: 8.5 MG/DL (ref 8.4–10.2)
CHLORIDE SERPL-SCNC: 103 MMOL/L (ref 96–108)
CO2 SERPL-SCNC: 25 MMOL/L (ref 21–32)
CREAT SERPL-MCNC: 0.91 MG/DL (ref 0.6–1.3)
GFR SERPL CREATININE-BSD FRML MDRD: 82 ML/MIN/1.73SQ M
GLUCOSE SERPL-MCNC: 119 MG/DL (ref 65–140)
GLUCOSE SERPL-MCNC: 126 MG/DL (ref 65–140)
GLUCOSE SERPL-MCNC: 127 MG/DL (ref 65–140)
GLUCOSE SERPL-MCNC: 141 MG/DL (ref 65–140)
GLUCOSE SERPL-MCNC: 180 MG/DL (ref 65–140)
POTASSIUM SERPL-SCNC: 4.2 MMOL/L (ref 3.5–5.3)
SODIUM SERPL-SCNC: 134 MMOL/L (ref 135–147)

## 2024-06-07 PROCEDURE — 99232 SBSQ HOSP IP/OBS MODERATE 35: CPT | Performed by: INTERNAL MEDICINE

## 2024-06-07 PROCEDURE — 80048 BASIC METABOLIC PNL TOTAL CA: CPT

## 2024-06-07 PROCEDURE — 82948 REAGENT STRIP/BLOOD GLUCOSE: CPT

## 2024-06-07 RX ADMIN — DICLOFENAC SODIUM 2 G: 10 GEL TOPICAL at 09:29

## 2024-06-07 RX ADMIN — ACETAMINOPHEN 975 MG: 325 TABLET, FILM COATED ORAL at 15:18

## 2024-06-07 RX ADMIN — METRONIDAZOLE 500 MG: 500 TABLET ORAL at 15:18

## 2024-06-07 RX ADMIN — INSULIN LISPRO 10 UNITS: 100 INJECTION, SOLUTION INTRAVENOUS; SUBCUTANEOUS at 09:30

## 2024-06-07 RX ADMIN — INSULIN LISPRO 10 UNITS: 100 INJECTION, SOLUTION INTRAVENOUS; SUBCUTANEOUS at 17:29

## 2024-06-07 RX ADMIN — CEFTRIAXONE 2000 MG: 2 INJECTION, POWDER, FOR SOLUTION INTRAMUSCULAR; INTRAVENOUS at 04:36

## 2024-06-07 RX ADMIN — ENOXAPARIN SODIUM 40 MG: 40 INJECTION SUBCUTANEOUS at 09:29

## 2024-06-07 RX ADMIN — INSULIN GLARGINE 32 UNITS: 100 INJECTION, SOLUTION SUBCUTANEOUS at 21:53

## 2024-06-07 RX ADMIN — SENNOSIDES, DOCUSATE SODIUM 1 TABLET: 8.6; 5 TABLET ORAL at 17:30

## 2024-06-07 RX ADMIN — METRONIDAZOLE 500 MG: 500 TABLET ORAL at 21:53

## 2024-06-07 RX ADMIN — LIDOCAINE 5% 1 PATCH: 700 PATCH TOPICAL at 09:28

## 2024-06-07 RX ADMIN — ACETAMINOPHEN 975 MG: 325 TABLET, FILM COATED ORAL at 05:35

## 2024-06-07 RX ADMIN — METRONIDAZOLE 500 MG: 500 TABLET ORAL at 05:35

## 2024-06-07 RX ADMIN — DICLOFENAC SODIUM 2 G: 10 GEL TOPICAL at 21:54

## 2024-06-07 RX ADMIN — DICLOFENAC SODIUM 2 G: 10 GEL TOPICAL at 12:49

## 2024-06-07 RX ADMIN — INSULIN LISPRO 10 UNITS: 100 INJECTION, SOLUTION INTRAVENOUS; SUBCUTANEOUS at 12:49

## 2024-06-07 RX ADMIN — SENNOSIDES, DOCUSATE SODIUM 1 TABLET: 8.6; 5 TABLET ORAL at 09:29

## 2024-06-07 RX ADMIN — POLYETHYLENE GLYCOL 3350 17 G: 17 POWDER, FOR SOLUTION ORAL at 09:29

## 2024-06-07 RX ADMIN — DICLOFENAC SODIUM 2 G: 10 GEL TOPICAL at 17:27

## 2024-06-07 RX ADMIN — ACETAMINOPHEN 975 MG: 325 TABLET, FILM COATED ORAL at 21:53

## 2024-06-07 RX ADMIN — INSULIN LISPRO 1 UNITS: 100 INJECTION, SOLUTION INTRAVENOUS; SUBCUTANEOUS at 12:49

## 2024-06-07 NOTE — ASSESSMENT & PLAN NOTE
"No results for input(s): \"WBC\" in the last 72 hours.    Patient reports he was started on Augmentin and was on day 3 for possible UTI.  UA negative on admission.  Leukocytosis in the setting of bacteremia.    Plan:  Antibiotics as bacteremia above.  "

## 2024-06-07 NOTE — PROGRESS NOTES
Progress Note - Infectious Disease   Capo Pepper 74 y.o. male MRN: [id number]  Unit/Bed#: W -01 Encounter: [id number]      Impression/Plan:  1.  Bacteroides bacteremia.  Patient initially presented for problem #2 and 1 of 2 blood cultures is now returned positive with Bacteroides.  Suspect occult dental source leading to #2.  CT of the face with multiple dental issues.  Could also consider occult GI source without prior colonoscopy.  Overall suspect polymicrobial process contributing to #2.  2D echo limited but grossly unremarkable.  Repeat cultures negative.  No plans for SHANELLE without other organisms isolated on blood cultures as it would not  currently.  Continue Ceftriaxone and flagyl  Plan for follow-up with OMFS as outpatient  Neurosurgical evaluation noted, follow-up outpatient  Recommend GI evaluation outpatient for colonoscopy  Patient requested new PCP  Plans for IR biopsy as below  Anticipate prolonged antibiotic course for #2  Continue to trend for curve/vitals  Repeat CBC/chemistry tomorrow to monitor treatment response/tolerance  Additional supportive care as per primary  Additional interventions pending clinical course     2.  Acute on chronic back pain and osteomyelitis of L2-3 with epidural abscess and myositis.  Patient has had chronic back pain since the 1970s.  He has been having worsening spasming now for the last 2 weeks.  Has overall been feeling unwell systemically now for 2 months.  Suspect secondary seeding of this site as above.  MRI confirmed OM at L2-3 with epidural abscess and myositis.  No plans for intervention per neurosurgery.  Plan for IR biopsy and culture on Monday.  Baseline ESR/CRP reviewed.  Anticipate prolonged antibiotic course until abscess resolution on imaging and improvement in inflammatory markers.  Continue ceftriaxone/Flagyl, presumed polymicrobial process  Neurosurgical evaluation noted, follow-up outpatient  Outpatient follow-up with  OMFS/PCP/GI  Plan for IR biopsy with culture on Monday  We will plan to follow-up IR biopsy cultures for at least 48 hours inpatient  Will contact lab to hold IR biopsy cultures for 10 days after collection  Tentative prescription for ceftriaxone/Flagyl provided to case management  Tentative plan for 6 weeks of therapy through 7/15  Weekly labs with CBCD, CMP, ESR and CRP as outpatient  Anticipate repeat imaging as outpatient to assess abscess resolution  May need to extend IV course until abscess resolution  Possible oral course depending on symptoms/inflammatory markers after IV therapy  Will arrange ID follow-up closer to discharge  Monitor neuroexam for now  Trend fever curve/WBC  Patient cleared from ID standpoint for PICC line  Additional supportive care per primary     3.  Type 2 diabetes.  Hemoglobin A1c of 9.4.  Likely risk factor of the above.  Ongoing glucose management per primary     4.  Leukocytosis.  Noted on presentation and I suspect is due to the above.  Fortunately improving.  Continue antibiotics as above.     Above plan discussed in detail with the patient and his wife at bedside  Above plan discussed with primary service who is aware of clearance for PICC line, continued antibiotic and plans to follow-up IR biopsy findings    ID consult service will reevaluate this patient again on Monday.  Please contact ID attending on call with questions in the interim.     Antibiotics:  Ceftriaxone/Flagyl 4    24 Hour events:  Yesterday and overnight notes reviewed and no acute events noted    Subjective:  Patient seen at bedside and he denies having any nausea, vomiting, chest pain.  He reports feeling improvement in his back discomfort with heating pad.  We discussed at this point plans for biopsy on Monday and following cultures for about 48 hours and if unremarkable transition to outpatient assuming services are available.  I let the patient know that I would let primary team move forward with PICC line  at this time.  His wife was present on the phone.  Additional questions were answered.  Patient also plans to have his wife bring in his back brace from home.    Objective:  Vitals:  Temp:  [98 °F (36.7 °C)-98.3 °F (36.8 °C)] 98.2 °F (36.8 °C)  HR:  [] 97  Resp:  [16-18] 18  BP: (128-138)/(72-88) 138/88  SpO2:  [91 %] 91 %  Temp (24hrs), Av.2 °F (36.8 °C), Min:98 °F (36.7 °C), Max:98.3 °F (36.8 °C)  Current: Temperature: 98.2 °F (36.8 °C)    Physical Exam:   General Appearance:  Alert, interactive, nontoxic, no acute distress.   Throat: Oropharynx moist without lesions.    Lungs:   Clear to auscultation bilaterally; no wheezes, rhonchi or rales; respirations unlabored on room air   Heart:  RRR; no murmur, rub or gallop noted   Abdomen:   Soft, non-tender, non-distended, positive bowel sounds.     Extremities: No clubbing, cyanosis or edema   Skin: No new rashes or lesions. No new draining wounds noted.       Labs, Imaging, & Other studies:   All pertinent labs and imaging studies in PACS were personally reviewed as below.  Results from last 7 days   Lab Units 24  0444 24  0611 24  1248   WBC Thousand/uL 8.38 9.76 12.29*   HEMOGLOBIN g/dL 11.8* 11.9* 13.3   PLATELETS Thousands/uL 377 368 381     Results from last 7 days   Lab Units 24  0539 24  2120 24  1447   POTASSIUM mmol/L 4.2   < > 4.8   CHLORIDE mmol/L 103   < > 92*   CO2 mmol/L 25   < > 21   BUN mg/dL 13   < > 24   CREATININE mg/dL 0.91   < > 1.05   EGFR ml/min/1.73sq m 82   < > 69   CALCIUM mg/dL 8.5   < > 8.5   AST U/L  --   --  21   ALT U/L  --   --  38   ALK PHOS U/L  --   --  79    < > = values in this interval not displayed.     Results from last 7 days   Lab Units 24  0940 24  1449 24  1447   BLOOD CULTURE  No Growth at 72 hrs.  No Growth at 72 hrs. No Growth After 5 Days. Bacteroides fragilis*  Bacteroides fragilis*   GRAM STAIN RESULT   --   --  Gram negative rods*       Lab  interpretation/comments: Baseline ESR/CRP noted.  Chemistry unremarkable.    Imaging interpretation/comments: No new imaging    Culture data: Repeat blood cultures without growth.

## 2024-06-07 NOTE — PLAN OF CARE
Problem: PAIN - ADULT  Goal: Verbalizes/displays adequate comfort level or baseline comfort level  Description: Interventions:  - Encourage patient to monitor pain and request assistance  - Assess pain using appropriate pain scale  - Administer analgesics based on type and severity of pain and evaluate response  - Implement non-pharmacological measures as appropriate and evaluate response  - Consider cultural and social influences on pain and pain management  - Notify physician/advanced practitioner if interventions unsuccessful or patient reports new pain  Outcome: Progressing     Problem: INFECTION - ADULT  Goal: Absence or prevention of progression during hospitalization  Description: INTERVENTIONS:  - Assess and monitor for signs and symptoms of infection  - Monitor lab/diagnostic results  - Monitor all insertion sites, i.e. indwelling lines, tubes, and drains  - Monitor endotracheal if appropriate and nasal secretions for changes in amount and color  - [place] appropriate cooling/warming therapies per order  - Administer medications as ordered  - Instruct and encourage patient and family to use good hand hygiene technique  - Identify and instruct in appropriate isolation precautions for identified infection/condition  Outcome: Progressing  Goal: Absence of fever/infection during neutropenic period  Description: INTERVENTIONS:  - Monitor WBC    Outcome: Progressing     Problem: SAFETY ADULT  Goal: Patient will remain free of falls  Description: INTERVENTIONS:  - Educate patient/family on patient safety including physical limitations  - Instruct patient to call for assistance with activity   - Consult OT/PT to assist with strengthening/mobility   - Keep Call bell within reach  - Keep bed low and locked with side rails adjusted as appropriate  - Keep care items and personal belongings within reach  - Initiate and maintain comfort rounds  - Make Fall Risk Sign visible to staff  - Offer Toileting every  Hours,  in advance of need  - Initiate/Maintain alarm  - Obtain necessary fall risk management equipment:   - Apply yellow socks and bracelet for high fall risk patients  - Consider moving patient to room near nurses station  Outcome: Progressing  Goal: Maintain or return to baseline ADL function  Description: INTERVENTIONS:  -  Assess patient's ability to carry out ADLs; assess patient's baseline for ADL function and identify physical deficits which impact ability to perform ADLs (bathing, care of mouth/teeth, toileting, grooming, dressing, etc.)  - Assess/evaluate cause of self-care deficits   - Assess range of motion  - Assess patient's mobility; develop plan if impaired  - Assess patient's need for assistive devices and provide as appropriate  - Encourage maximum independence but intervene and supervise when necessary  - Involve family in performance of ADLs  - Assess for home care needs following discharge   - Consider OT consult to assist with ADL evaluation and planning for discharge  - Provide patient education as appropriate  Outcome: Progressing  Goal: Maintains/Returns to pre admission functional level  Description: INTERVENTIONS:  - Perform AM-PAC 6 Click Basic Mobility/ Daily Activity assessment daily.  - Set and communicate daily mobility goal to care team and patient/family/caregiver.   - Collaborate with rehabilitation services on mobility goals if consulted  - Perform Range of Motion times a day.  - Reposition patient every  hours.  - Dangle patient  times a day  - Stand patient  times a day  - Ambulate patient  times a day  - Out of bed to chair  times a day   - Out of bed for meals imes a day  - Out of bed for toileting  - Record patient progress and toleration of activity level   Outcome: Progressing     Problem: DISCHARGE PLANNING  Goal: Discharge to home or other facility with appropriate resources  Description: INTERVENTIONS:  - Identify barriers to discharge w/patient and caregiver  - Arrange for  needed discharge resources and transportation as appropriate  - Identify discharge learning needs (meds, wound care, etc.)  - Arrange for interpretive services to assist at discharge as needed  - Refer to Case Management Department for coordinating discharge planning if the patient needs post-hospital services based on physician/advanced practitioner order or complex needs related to functional status, cognitive ability, or social support system  Outcome: Progressing     Problem: Knowledge Deficit  Goal: Patient/family/caregiver demonstrates understanding of disease process, treatment plan, medications, and discharge instructions  Description: Complete learning assessment and assess knowledge base.  Interventions:  - Provide teaching at level of understanding  - Provide teaching via preferred learning methods  Outcome: Progressing

## 2024-06-07 NOTE — PLAN OF CARE
Problem: PAIN - ADULT  Goal: Verbalizes/displays adequate comfort level or baseline comfort level  Description: Interventions:  - Encourage patient to monitor pain and request assistance  - Assess pain using appropriate pain scale  - Administer analgesics based on type and severity of pain and evaluate response  - Implement non-pharmacological measures as appropriate and evaluate response  - Consider cultural and social influences on pain and pain management  - Notify physician/advanced practitioner if interventions unsuccessful or patient reports new pain  Outcome: Progressing     Problem: INFECTION - ADULT  Goal: Absence or prevention of progression during hospitalization  Description: INTERVENTIONS:  - Assess and monitor for signs and symptoms of infection  - Monitor lab/diagnostic results  - Monitor all insertion sites, i.e. indwelling lines, tubes, and drains  - Monitor endotracheal if appropriate and nasal secretions for changes in amount and color  - [place] appropriate cooling/warming therapies per order  - Administer medications as ordered  - Instruct and encourage patient and family to use good hand hygiene technique  - Identify and instruct in appropriate isolation precautions for identified infection/condition  Outcome: Progressing  Goal: Absence of fever/infection during neutropenic period  Description: INTERVENTIONS:  - Monitor WBC    Outcome: Progressing     Problem: SAFETY ADULT  Goal: Patient will remain free of falls  Description: INTERVENTIONS:  - Educate patient/family on patient safety including physical limitations  - Instruct patient to call for assistance with activity   - Consult OT/PT to assist with strengthening/mobility   - Keep Call bell within reach  - Keep bed low and locked with side rails adjusted as appropriate  - Keep care items and personal belongings within reach  - Initiate and maintain comfort rounds  - Make Fall Risk Sign visible to staff  - Offer Toileting every 2 Hours,  in advance of need  - Initiate/Maintain bed alarm  - Obtain necessary fall risk management equipment:   - Apply yellow socks and bracelet for high fall risk patients  - Consider moving patient to room near nurses station  Outcome: Progressing  Goal: Maintain or return to baseline ADL function  Description: INTERVENTIONS:  -  Assess patient's ability to carry out ADLs; assess patient's baseline for ADL function and identify physical deficits which impact ability to perform ADLs (bathing, care of mouth/teeth, toileting, grooming, dressing, etc.)  - Assess/evaluate cause of self-care deficits   - Assess range of motion  - Assess patient's mobility; develop plan if impaired  - Assess patient's need for assistive devices and provide as appropriate  - Encourage maximum independence but intervene and supervise when necessary  - Involve family in performance of ADLs  - Assess for home care needs following discharge   - Consider OT consult to assist with ADL evaluation and planning for discharge  - Provide patient education as appropriate  Outcome: Progressing  Goal: Maintains/Returns to pre admission functional level  Description: INTERVENTIONS:  - Perform AM-PAC 6 Click Basic Mobility/ Daily Activity assessment daily.  - Set and communicate daily mobility goal to care team and patient/family/caregiver.   - Collaborate with rehabilitation services on mobility goals if consulted  - Perform Range of Motion 2 times a day.  - Reposition patient every 2 hours.  - Dangle patient 2 times a day  - Stand patient 2 times a day  - Ambulate patient 2 times a day  - Out of bed to chair 2 times a day   - Out of bed for meals 2 times a day  - Out of bed for toileting  - Record patient progress and toleration of activity level   Outcome: Progressing     Problem: DISCHARGE PLANNING  Goal: Discharge to home or other facility with appropriate resources  Description: INTERVENTIONS:  - Identify barriers to discharge w/patient and caregiver  -  Arrange for needed discharge resources and transportation as appropriate  - Identify discharge learning needs (meds, wound care, etc.)  - Arrange for interpretive services to assist at discharge as needed  - Refer to Case Management Department for coordinating discharge planning if the patient needs post-hospital services based on physician/advanced practitioner order or complex needs related to functional status, cognitive ability, or social support system  Outcome: Progressing

## 2024-06-07 NOTE — CASE MANAGEMENT
Case Management Discharge Planning Note    Patient name Capo Pepper  Location W /W -01 MRN [id number]  : 1950 Date 2024       Current Admission Date: 2024  Current Admission Diagnosis:Hyponatremia   Patient Active Problem List    Diagnosis Date Noted Date Diagnosed    Bacteremia 2024     Leukocytosis 2024     Ambulatory dysfunction 2024     Acute hypoxemic respiratory failure due to COVID-19 (HCC) 2022     Hyponatremia 2022     Nontraumatic complete tear of right rotator cuff 2021     Biceps tendinopathy of right upper extremity 2021     Primary osteoarthritis of right knee 2021     Acute pain of right shoulder 2021     Dyspnea 2021     Stroke due to embolism of left middle cerebral artery (HCC) 2020     Type 2 diabetes mellitus (HCC) 2020     Pleural effusion 2020     Cardiomyopathy (HCC) 2020     Coronary artery disease involving native coronary artery of native heart without angina pectoris 2020     Mixed hyperlipidemia 2020     Precordial pain 2016     Chronic low back pain 2016     Chronic combined systolic and diastolic congestive heart failure (HCC) 2016     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HCC) 2016     EKG, abnormal 2016       LOS (days): 6  Geometric Mean LOS (GMLOS) (days): 5.9  Days to GMLOS:0     OBJECTIVE:  Risk of Unplanned Readmission Score: 9.32         Current admission status: Inpatient   Preferred Pharmacy:   [id number] - [place] [address]  [place] [address]  Phone: [phone] Fax: [phone]    Primary Care Provider: Vishal Arango MD    Primary Insurance: MEDICARE  Secondary Insurance: AETNA    DISCHARGE DETAILS:    Discharge planning discussed with:: patient at bedside, wife Miguel Ángel over phone  Freedom of Choice: Yes  Comments - Freedom of Choice: [facility], [facility]  CM contacted family/caregiver?: Yes  Were  Treatment Team discharge recommendations reviewed with patient/caregiver?: Yes  Did patient/caregiver verbalize understanding of patient care needs?: Yes  Were patient/caregiver advised of the risks associated with not following Treatment Team discharge recommendations?: Yes    Contacts  Patient Contacts: Miguel Ángel  Relationship to Patient:: Family (wife)  Contact Method: Phone (patient called wife, on his cell at bedside)  Reason/Outcome: Continuity of Care, Emergency Contact, Referral, Discharge Planning    Requested Home Health Care         Is the patient interested in HHC at discharge?: Yes  Home Health Discipline requested:: Nursing  [facility]  Home Health Follow-Up Provider:: HENRIQUE  Home Health Services Needed:: Administration of IV, IM or SC Medications  Homebound Criteria Met:: Requires the Assistance of Another Person for Safe Ambulation or to Leave the Home  Supporting Clincal Findings:: Limited Endurance    DME Referral Provided  Referral made for DME?: No    Other Referral/Resources/Interventions Provided:  Interventions: Home Infusion, HHC  Referral Comments: CM notified by ID, that patient will be in need of long term abx and would like to discuss HHC vs OP infusion center-- specifically would like to know cost/ pricing. Medicaiton scripts sent to [facility] via [employer] for pricing (total of $1498.38, would need half up front). CM met wiuth patient at bedside to discuss. Patient called wife, Miguel Ángel and placed her on speaker phone for CM to discuss with patient and wife. CM reviewed pricing with patient, notified that they would owe half up front. Both patient and wife agreeable to pricing. Patient and wife aware HHC would be needed for nursing and are agreeable to referrals, stating they are agreeable to [facility] if available. Wife also stating she would be willing to learn how to administer meds.     Referral placed via [facility]-- [facility] able to accept, agency reserved at this time.      CM to follow up as able to continue with dcp.     Would you like to participate in our [facility] service program?  : No - Declined    Treatment Team Recommendation: Home with Home Health Care  Discharge Destination Plan:: Home with Home Health Care  Transport at Discharge : Family

## 2024-06-07 NOTE — ASSESSMENT & PLAN NOTE
Recent Labs     06/05/24  0527 06/06/24  0558 06/07/24  0539   SODIUM 131* 130* 134*        Assessment:  Reported decreased p.o. intake for the past few weeks.   corrected for hyperglycemia during admission.    Serum osmolality 282, urine osmolality 310, urine sodium 23.  Improved.     Plan:   Continue to monitor and encourage oral intake.   Diabetic diet with 1500 L fluid restriction.

## 2024-06-07 NOTE — ASSESSMENT & PLAN NOTE
Assessment:  Blood culture x 1 Bacteroides fragilis.  Reported having infected teeth for years.  Patient is afebrile.  Leukocytosis.  CT facial bones did not show any dental abscess.  MRI: acute discitis/osteomyelitis at L2-L3, 7.0 cm ventral epidural abscess spanning from mid L1 to mid L3 levels, enhancing epidural phlegmonous change extending into left L2-L3 foramen, associated moderate canal stenosis at L2-L3 levels and mild-to-moderate left L2-L3 foraminal narrowing.     Plan  ID consult appreciated-will need a prolonged course of IV antibiotics till 7/15.   Biopsy on Monday by IR.  OMFS Consult-outpatient follow-up.  Neurosurgery consult-recommended outpatient follow-up given no weakness at this point.  Continue Flagyl 500 mg every 8 hours.  Continue ceftriaxone 2 g every 24 hours.  Pending repeated Blood cx
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
## 2024-06-07 NOTE — ASSESSMENT & PLAN NOTE
Lab Results   Component Value Date    HGBA1C 9.4 (H) 06/01/2024       Recent Labs     06/06/24  1609 06/06/24  2133 06/07/24  0730 06/07/24  1054   POCGLU 125 143* 141* 180*         Blood Sugar Average: Last 72 hrs:  (P) [id number]    Home Medications: Metformin, unclear if patient is taking it.  6/1: A1c is 9.4    Plan:  SSI while inpatient  Continue Lantus 32 units, qhs   Continue Humalog 10 units,TID with meals   Glucose checks and Insulin correction ACHS  Hypoglycemia protocol.

## 2024-06-07 NOTE — ASSESSMENT & PLAN NOTE
Wt Readings from Last 3 Encounters:   06/05/24 94.3 kg (207 lb 14.3 oz)   01/14/22 96 kg (211 lb 10.3 oz)   08/26/21 99.8 kg (220 lb)     Assessment:  Echo January 2021: EF 25%, severe diffuse hypokinesis with regional variations. G2DD.  Home medications: Ramipril and spironolactone.  Patient is not volume overloaded on exam.  Echocardiogram this admission: EF 55%.  Normal systolic function. No evidence of vegetations.    Plan:   Sodium restriction 2g/day, Fluid restriction 2L/day  AM BMP  HOB > 30°, Daily standing weights, Measure I/O

## 2024-06-08 PROBLEM — M46.26 OSTEOMYELITIS OF LUMBAR SPINE (HCC): Status: ACTIVE | Noted: 2024-06-08

## 2024-06-08 LAB
ANION GAP SERPL CALCULATED.3IONS-SCNC: 7 MMOL/L (ref 4–13)
B FRAGILIS DNA BLD POS QL NAA+NON-PROBE: DETECTED
BACTERIA BLD CULT: ABNORMAL
BACTERIA BLD CULT: ABNORMAL
BACTERIA BLD CULT: NORMAL
BACTERIA BLD CULT: NORMAL
BUN SERPL-MCNC: 14 MG/DL (ref 5–25)
CALCIUM SERPL-MCNC: 8.7 MG/DL (ref 8.4–10.2)
CHLORIDE SERPL-SCNC: 101 MMOL/L (ref 96–108)
CO2 SERPL-SCNC: 25 MMOL/L (ref 21–32)
CREAT SERPL-MCNC: 0.97 MG/DL (ref 0.6–1.3)
ERYTHROCYTE [DISTWIDTH] IN BLOOD BY AUTOMATED COUNT: 13.6 % (ref 11.6–15.1)
GFR SERPL CREATININE-BSD FRML MDRD: 76 ML/MIN/1.73SQ M
GLUCOSE SERPL-MCNC: 102 MG/DL (ref 65–140)
GLUCOSE SERPL-MCNC: 130 MG/DL (ref 65–140)
GLUCOSE SERPL-MCNC: 139 MG/DL (ref 65–140)
GLUCOSE SERPL-MCNC: 139 MG/DL (ref 65–140)
GLUCOSE SERPL-MCNC: 209 MG/DL (ref 65–140)
GRAM STN SPEC: ABNORMAL
HCT VFR BLD AUTO: 36.5 % (ref 36.5–49.3)
HGB BLD-MCNC: 12 G/DL (ref 12–17)
MCH RBC QN AUTO: 30.2 PG (ref 26.8–34.3)
MCHC RBC AUTO-ENTMCNC: 32.9 G/DL (ref 31.4–37.4)
MCV RBC AUTO: 92 FL (ref 82–98)
PLATELET # BLD AUTO: 361 THOUSANDS/UL (ref 149–390)
PMV BLD AUTO: 9.1 FL (ref 8.9–12.7)
POTASSIUM SERPL-SCNC: 4 MMOL/L (ref 3.5–5.3)
RBC # BLD AUTO: 3.97 MILLION/UL (ref 3.88–5.62)
SODIUM SERPL-SCNC: 133 MMOL/L (ref 135–147)
WBC # BLD AUTO: 9.97 THOUSAND/UL (ref 4.31–10.16)

## 2024-06-08 PROCEDURE — 85027 COMPLETE CBC AUTOMATED: CPT

## 2024-06-08 PROCEDURE — 80048 BASIC METABOLIC PNL TOTAL CA: CPT

## 2024-06-08 PROCEDURE — 99232 SBSQ HOSP IP/OBS MODERATE 35: CPT | Performed by: HOSPITALIST

## 2024-06-08 PROCEDURE — 93005 ELECTROCARDIOGRAM TRACING: CPT

## 2024-06-08 PROCEDURE — 82948 REAGENT STRIP/BLOOD GLUCOSE: CPT

## 2024-06-08 RX ADMIN — METHOCARBAMOL 500 MG: 500 TABLET ORAL at 11:33

## 2024-06-08 RX ADMIN — CEFTRIAXONE 2000 MG: 2 INJECTION, POWDER, FOR SOLUTION INTRAMUSCULAR; INTRAVENOUS at 04:03

## 2024-06-08 RX ADMIN — INSULIN LISPRO 10 UNITS: 100 INJECTION, SOLUTION INTRAVENOUS; SUBCUTANEOUS at 18:22

## 2024-06-08 RX ADMIN — ACETAMINOPHEN 975 MG: 325 TABLET, FILM COATED ORAL at 05:08

## 2024-06-08 RX ADMIN — INSULIN LISPRO 10 UNITS: 100 INJECTION, SOLUTION INTRAVENOUS; SUBCUTANEOUS at 08:13

## 2024-06-08 RX ADMIN — SENNOSIDES, DOCUSATE SODIUM 1 TABLET: 8.6; 5 TABLET ORAL at 18:21

## 2024-06-08 RX ADMIN — METRONIDAZOLE 500 MG: 500 TABLET ORAL at 13:56

## 2024-06-08 RX ADMIN — LIDOCAINE 5% 1 PATCH: 700 PATCH TOPICAL at 08:12

## 2024-06-08 RX ADMIN — METHOCARBAMOL 500 MG: 500 TABLET ORAL at 21:46

## 2024-06-08 RX ADMIN — DICLOFENAC SODIUM 2 G: 10 GEL TOPICAL at 18:22

## 2024-06-08 RX ADMIN — METHOCARBAMOL 500 MG: 500 TABLET ORAL at 04:03

## 2024-06-08 RX ADMIN — DICLOFENAC SODIUM 2 G: 10 GEL TOPICAL at 11:33

## 2024-06-08 RX ADMIN — METRONIDAZOLE 500 MG: 500 TABLET ORAL at 21:36

## 2024-06-08 RX ADMIN — INSULIN LISPRO 10 UNITS: 100 INJECTION, SOLUTION INTRAVENOUS; SUBCUTANEOUS at 13:57

## 2024-06-08 RX ADMIN — SODIUM CHLORIDE 500 ML: 0.9 INJECTION, SOLUTION INTRAVENOUS at 11:33

## 2024-06-08 RX ADMIN — ACETAMINOPHEN 975 MG: 325 TABLET, FILM COATED ORAL at 21:36

## 2024-06-08 RX ADMIN — INSULIN GLARGINE 32 UNITS: 100 INJECTION, SOLUTION SUBCUTANEOUS at 21:36

## 2024-06-08 RX ADMIN — DICLOFENAC SODIUM 2 G: 10 GEL TOPICAL at 08:12

## 2024-06-08 RX ADMIN — INSULIN LISPRO 2 UNITS: 100 INJECTION, SOLUTION INTRAVENOUS; SUBCUTANEOUS at 18:22

## 2024-06-08 RX ADMIN — METRONIDAZOLE 500 MG: 500 TABLET ORAL at 05:08

## 2024-06-08 RX ADMIN — ACETAMINOPHEN 975 MG: 325 TABLET, FILM COATED ORAL at 13:56

## 2024-06-08 RX ADMIN — ENOXAPARIN SODIUM 40 MG: 40 INJECTION SUBCUTANEOUS at 08:12

## 2024-06-08 NOTE — ASSESSMENT & PLAN NOTE
Assessment:  Blood culture x 1 Bacteroides fragilis.  Reported having infected teeth for years.  Patient is afebrile.  Leukocytosis.  CT facial bones did not show any dental abscess.  MRI: acute discitis/osteomyelitis at L2-L3, 7.0 cm ventral epidural abscess spanning from mid L1 to mid L3 levels, enhancing epidural phlegmonous change extending into left L2-L3 foramen, associated moderate canal stenosis at L2-L3 levels and mild-to-moderate left L2-L3 foraminal narrowing.   Second set of blood cultures negative after 4 days    Plan  ID consult appreciated-will need a prolonged course of IV antibiotics till 7/15.   Biopsy on Monday by IR.  Lovenox held for Monday procedure,  resume Lovenox for DVT prophylaxis after procedure.  OMFS Consult-outpatient follow-up.  Neurosurgery consult-recommended outpatient follow-up given no weakness at this point.  Continue Flagyl 500 mg every 8 hours.  Continue ceftriaxone 2 g every 24 hours.

## 2024-06-08 NOTE — ASSESSMENT & PLAN NOTE
Recent Labs     06/08/24  0518   WBC 9.97       Patient reports he was started on Augmentin and was on day 3 for possible UTI.  UA negative on admission.  Leukocytosis in the setting of bacteremia.    Plan:  Antibiotics as bacteremia above.

## 2024-06-08 NOTE — ASSESSMENT & PLAN NOTE
Patient on presentation with acute on chronic back pain.  Given the bacteremia underwent MRI lumbar spine:  MRI: acute discitis/osteomyelitis at L2-L3, 7.0 cm ventral epidural abscess spanning from mid L1 to mid L3 levels, enhancing epidural phlegmonous change extending into left L2-L3 foramen, associated moderate canal stenosis at L2-L3 levels and mild-to-moderate left L2-L3 foraminal narrowing.   See plan for bacteremia

## 2024-06-08 NOTE — ASSESSMENT & PLAN NOTE
Recent Labs     06/06/24  0558 06/07/24  0539 06/08/24  0518   SODIUM 130* 134* 133*        Assessment:  Reported decreased p.o. intake for the past few weeks.   corrected for hyperglycemia during admission.    Serum osmolality 282, urine osmolality 310, urine sodium 23.  Improved.     Plan:   Continue to monitor and encourage oral intake.   Diabetic diet with 1500 L fluid restriction.

## 2024-06-08 NOTE — PLAN OF CARE
Problem: PAIN - ADULT  Goal: Verbalizes/displays adequate comfort level or baseline comfort level  Description: Interventions:  - Encourage patient to monitor pain and request assistance  - Assess pain using appropriate pain scale  - Administer analgesics based on type and severity of pain and evaluate response  - Implement non-pharmacological measures as appropriate and evaluate response  - Consider cultural and social influences on pain and pain management  - Notify physician/advanced practitioner if interventions unsuccessful or patient reports new pain  Outcome: Progressing     Problem: INFECTION - ADULT  Goal: Absence or prevention of progression during hospitalization  Description: INTERVENTIONS:  - Assess and monitor for signs and symptoms of infection  - Monitor lab/diagnostic results  - Monitor all insertion sites, i.e. indwelling lines, tubes, and drains  - Monitor endotracheal if appropriate and nasal secretions for changes in amount and color  - [place] appropriate cooling/warming therapies per order  - Administer medications as ordered  - Instruct and encourage patient and family to use good hand hygiene technique  - Identify and instruct in appropriate isolation precautions for identified infection/condition  Outcome: Progressing  Goal: Absence of fever/infection during neutropenic period  Description: INTERVENTIONS:  - Monitor WBC    Outcome: Progressing     Problem: SAFETY ADULT  Goal: Patient will remain free of falls  Description: INTERVENTIONS:  - Educate patient/family on patient safety including physical limitations  - Instruct patient to call for assistance with activity   - Consult OT/PT to assist with strengthening/mobility   - Keep Call bell within reach  - Keep bed low and locked with side rails adjusted as appropriate  - Keep care items and personal belongings within reach  - Initiate and maintain comfort rounds  - Make Fall Risk Sign visible to staff  - Apply yellow socks and bracelet  for high fall risk patients  - Consider moving patient to room near nurses station  Outcome: Progressing  Goal: Maintain or return to baseline ADL function  Description: INTERVENTIONS:  -  Assess patient's ability to carry out ADLs; assess patient's baseline for ADL function and identify physical deficits which impact ability to perform ADLs (bathing, care of mouth/teeth, toileting, grooming, dressing, etc.)  - Assess/evaluate cause of self-care deficits   - Assess range of motion  - Assess patient's mobility; develop plan if impaired  - Assess patient's need for assistive devices and provide as appropriate  - Encourage maximum independence but intervene and supervise when necessary  - Involve family in performance of ADLs  - Assess for home care needs following discharge   - Consider OT consult to assist with ADL evaluation and planning for discharge  - Provide patient education as appropriate  Outcome: Progressing  Goal: Maintains/Returns to pre admission functional level  Description: INTERVENTIONS:  - Perform AM-PAC 6 Click Basic Mobility/ Daily Activity assessment daily.  - Set and communicate daily mobility goal to care team and patient/family/caregiver.   - Collaborate with rehabilitation services on mobility goals if consulted  - Out of bed for toileting  - Record patient progress and toleration of activity level   Outcome: Progressing     Problem: DISCHARGE PLANNING  Goal: Discharge to home or other facility with appropriate resources  Description: INTERVENTIONS:  - Identify barriers to discharge w/patient and caregiver  - Arrange for needed discharge resources and transportation as appropriate  - Identify discharge learning needs (meds, wound care, etc.)  - Arrange for interpretive services to assist at discharge as needed  - Refer to Case Management Department for coordinating discharge planning if the patient needs post-hospital services based on physician/advanced practitioner order or complex needs  related to functional status, cognitive ability, or social support system  Outcome: Progressing     Problem: Knowledge Deficit  Goal: Patient/family/caregiver demonstrates understanding of disease process, treatment plan, medications, and discharge instructions  Description: Complete learning assessment and assess knowledge base.  Interventions:  - Provide teaching at level of understanding  - Provide teaching via preferred learning methods  Outcome: Progressing

## 2024-06-08 NOTE — ASSESSMENT & PLAN NOTE
Lab Results   Component Value Date    HGBA1C 9.4 (H) 06/01/2024       Recent Labs     06/07/24  1613 06/07/24  2054 06/08/24  0751 06/08/24  1136   POCGLU 126 127 139 139         Blood Sugar Average: Last 72 hrs:  (P) 137.3639161819717571    Home Medications: Metformin, unclear if patient is taking it.  6/1: A1c is 9.4    Plan:  SSI while inpatient  Continue Lantus 32 units, qhs   Continue Humalog 10 units,TID with meals   Glucose checks and Insulin correction ACHS  Hypoglycemia protocol.
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
## 2024-06-09 PROBLEM — D72.829 LEUKOCYTOSIS: Status: RESOLVED | Noted: 2024-06-02 | Resolved: 2024-06-09

## 2024-06-09 LAB
ANION GAP SERPL CALCULATED.3IONS-SCNC: 5 MMOL/L (ref 4–13)
ATRIAL RATE: 103 BPM
BUN SERPL-MCNC: 11 MG/DL (ref 5–25)
CALCIUM SERPL-MCNC: 9 MG/DL (ref 8.4–10.2)
CHLORIDE SERPL-SCNC: 103 MMOL/L (ref 96–108)
CO2 SERPL-SCNC: 25 MMOL/L (ref 21–32)
CREAT SERPL-MCNC: 0.97 MG/DL (ref 0.6–1.3)
GFR SERPL CREATININE-BSD FRML MDRD: 76 ML/MIN/1.73SQ M
GLUCOSE SERPL-MCNC: 111 MG/DL (ref 65–140)
GLUCOSE SERPL-MCNC: 138 MG/DL (ref 65–140)
GLUCOSE SERPL-MCNC: 138 MG/DL (ref 65–140)
GLUCOSE SERPL-MCNC: 154 MG/DL (ref 65–140)
GLUCOSE SERPL-MCNC: 202 MG/DL (ref 65–140)
P AXIS: 55 DEGREES
POTASSIUM SERPL-SCNC: 3.8 MMOL/L (ref 3.5–5.3)
PR INTERVAL: 164 MS
QRS AXIS: 4 DEGREES
QRSD INTERVAL: 100 MS
QT INTERVAL: 354 MS
QTC INTERVAL: 463 MS
SODIUM SERPL-SCNC: 133 MMOL/L (ref 135–147)
T WAVE AXIS: 67 DEGREES
VENTRICULAR RATE: 103 BPM

## 2024-06-09 PROCEDURE — 82948 REAGENT STRIP/BLOOD GLUCOSE: CPT

## 2024-06-09 PROCEDURE — 93010 ELECTROCARDIOGRAM REPORT: CPT | Performed by: INTERNAL MEDICINE

## 2024-06-09 PROCEDURE — 99232 SBSQ HOSP IP/OBS MODERATE 35: CPT | Performed by: HOSPITALIST

## 2024-06-09 PROCEDURE — 80048 BASIC METABOLIC PNL TOTAL CA: CPT

## 2024-06-09 RX ADMIN — METRONIDAZOLE 500 MG: 500 TABLET ORAL at 21:58

## 2024-06-09 RX ADMIN — INSULIN LISPRO 2 UNITS: 100 INJECTION, SOLUTION INTRAVENOUS; SUBCUTANEOUS at 17:46

## 2024-06-09 RX ADMIN — ACETAMINOPHEN 975 MG: 325 TABLET, FILM COATED ORAL at 21:58

## 2024-06-09 RX ADMIN — METHOCARBAMOL 500 MG: 500 TABLET ORAL at 04:32

## 2024-06-09 RX ADMIN — INSULIN LISPRO 10 UNITS: 100 INJECTION, SOLUTION INTRAVENOUS; SUBCUTANEOUS at 12:23

## 2024-06-09 RX ADMIN — METRONIDAZOLE 500 MG: 500 TABLET ORAL at 15:21

## 2024-06-09 RX ADMIN — ACETAMINOPHEN 975 MG: 325 TABLET, FILM COATED ORAL at 15:20

## 2024-06-09 RX ADMIN — INSULIN LISPRO 10 UNITS: 100 INJECTION, SOLUTION INTRAVENOUS; SUBCUTANEOUS at 17:47

## 2024-06-09 RX ADMIN — ENOXAPARIN SODIUM 40 MG: 40 INJECTION SUBCUTANEOUS at 08:18

## 2024-06-09 RX ADMIN — METRONIDAZOLE 500 MG: 500 TABLET ORAL at 05:08

## 2024-06-09 RX ADMIN — METHOCARBAMOL 500 MG: 500 TABLET ORAL at 22:14

## 2024-06-09 RX ADMIN — ACETAMINOPHEN 975 MG: 325 TABLET, FILM COATED ORAL at 05:08

## 2024-06-09 RX ADMIN — INSULIN LISPRO 10 UNITS: 100 INJECTION, SOLUTION INTRAVENOUS; SUBCUTANEOUS at 08:20

## 2024-06-09 RX ADMIN — CEFTRIAXONE 2000 MG: 2 INJECTION, POWDER, FOR SOLUTION INTRAMUSCULAR; INTRAVENOUS at 04:20

## 2024-06-09 RX ADMIN — DICLOFENAC SODIUM 2 G: 10 GEL TOPICAL at 08:19

## 2024-06-09 RX ADMIN — INSULIN GLARGINE 32 UNITS: 100 INJECTION, SOLUTION SUBCUTANEOUS at 21:58

## 2024-06-09 NOTE — PLAN OF CARE
Problem: PAIN - ADULT  Goal: Verbalizes/displays adequate comfort level or baseline comfort level  Description: Interventions:  - Encourage patient to monitor pain and request assistance  - Assess pain using appropriate pain scale  - Administer analgesics based on type and severity of pain and evaluate response  - Implement non-pharmacological measures as appropriate and evaluate response  - Consider cultural and social influences on pain and pain management  - Notify physician/advanced practitioner if interventions unsuccessful or patient reports new pain  Outcome: Progressing     Problem: INFECTION - ADULT  Goal: Absence or prevention of progression during hospitalization  Description: INTERVENTIONS:  - Assess and monitor for signs and symptoms of infection  - Monitor lab/diagnostic results  - Monitor all insertion sites, i.e. indwelling lines, tubes, and drains  - Monitor endotracheal if appropriate and nasal secretions for changes in amount and color  - [place] appropriate cooling/warming therapies per order  - Administer medications as ordered  - Instruct and encourage patient and family to use good hand hygiene technique  - Identify and instruct in appropriate isolation precautions for identified infection/condition  Outcome: Progressing  Goal: Absence of fever/infection during neutropenic period  Description: INTERVENTIONS:  - Monitor WBC    Outcome: Progressing     Problem: SAFETY ADULT  Goal: Patient will remain free of falls  Description: INTERVENTIONS:  - Educate patient/family on patient safety including physical limitations  - Instruct patient to call for assistance with activity   - Consult OT/PT to assist with strengthening/mobility   - Keep Call bell within reach  - Keep bed low and locked with side rails adjusted as appropriate  - Keep care items and personal belongings within reach  - Initiate and maintain comfort rounds  - Make Fall Risk Sign visible to staff  - Apply yellow socks and bracelet  for high fall risk patients  - Consider moving patient to room near nurses station  Outcome: Progressing  Goal: Maintain or return to baseline ADL function  Description: INTERVENTIONS:  -  Assess patient's ability to carry out ADLs; assess patient's baseline for ADL function and identify physical deficits which impact ability to perform ADLs (bathing, care of mouth/teeth, toileting, grooming, dressing, etc.)  - Assess/evaluate cause of self-care deficits   - Assess range of motion  - Assess patient's mobility; develop plan if impaired  - Assess patient's need for assistive devices and provide as appropriate  - Encourage maximum independence but intervene and supervise when necessary  - Involve family in performance of ADLs  - Assess for home care needs following discharge   - Consider OT consult to assist with ADL evaluation and planning for discharge  - Provide patient education as appropriate  Outcome: Progressing  Goal: Maintains/Returns to pre admission functional level  Description: INTERVENTIONS:  - Perform AM-PAC 6 Click Basic Mobility/ Daily Activity assessment daily.  - Set and communicate daily mobility goal to care team and patient/family/caregiver.   - Collaborate with rehabilitation services on mobility goals if consulted  - Out of bed for toileting  - Record patient progress and toleration of activity level   Outcome: Progressing     Problem: DISCHARGE PLANNING  Goal: Discharge to home or other facility with appropriate resources  Description: INTERVENTIONS:  - Identify barriers to discharge w/patient and caregiver  - Arrange for needed discharge resources and transportation as appropriate  - Identify discharge learning needs (meds, wound care, etc.)  - Arrange for interpretive services to assist at discharge as needed  - Refer to Case Management Department for coordinating discharge planning if the patient needs post-hospital services based on physician/advanced practitioner order or complex needs  related to functional status, cognitive ability, or social support system  Outcome: Progressing     Problem: Knowledge Deficit  Goal: Patient/family/caregiver demonstrates understanding of disease process, treatment plan, medications, and discharge instructions  Description: Complete learning assessment and assess knowledge base.  Interventions:  - Provide teaching at level of understanding  - Provide teaching via preferred learning methods  Outcome: Progressing

## 2024-06-09 NOTE — ASSESSMENT & PLAN NOTE
Recent Labs     06/07/24  0539 06/08/24  0518   SODIUM 134* 133*        Assessment:  Reported decreased p.o. intake for the past few weeks.   corrected for hyperglycemia during admission.    Serum osmolality 282, urine osmolality 310, urine sodium 23.  Improved.     Plan:   Continue to monitor and encourage oral intake.   Diabetic diet with 1500 L fluid restriction.

## 2024-06-09 NOTE — ASSESSMENT & PLAN NOTE
Patient on presentation with acute on chronic back pain.  Given the bacteremia underwent MRI lumbar spine.   MRI: acute discitis/osteomyelitis at L2-L3, 7.0 cm ventral epidural abscess spanning from mid L1 to mid L3 levels, enhancing epidural phlegmonous change extending into left L2-L3 foramen, associated moderate canal stenosis at L2-L3 levels and mild-to-moderate left L2-L3 foraminal narrowing.   See plan for bacteremia

## 2024-06-09 NOTE — PLAN OF CARE
Problem: PAIN - ADULT  Goal: Verbalizes/displays adequate comfort level or baseline comfort level  Description: Interventions:  - Encourage patient to monitor pain and request assistance  - Assess pain using appropriate pain scale  - Administer analgesics based on type and severity of pain and evaluate response  - Implement non-pharmacological measures as appropriate and evaluate response  - Consider cultural and social influences on pain and pain management  - Notify physician/advanced practitioner if interventions unsuccessful or patient reports new pain  Outcome: Progressing     Problem: INFECTION - ADULT  Goal: Absence or prevention of progression during hospitalization  Description: INTERVENTIONS:  - Assess and monitor for signs and symptoms of infection  - Monitor lab/diagnostic results  - Monitor all insertion sites, i.e. indwelling lines, tubes, and drains  - Monitor endotracheal if appropriate and nasal secretions for changes in amount and color  - [place] appropriate cooling/warming therapies per order  - Administer medications as ordered  - Instruct and encourage patient and family to use good hand hygiene technique  - Identify and instruct in appropriate isolation precautions for identified infection/condition  Outcome: Progressing  Goal: Absence of fever/infection during neutropenic period  Description: INTERVENTIONS:  - Monitor WBC    Outcome: Progressing     Problem: SAFETY ADULT  Goal: Patient will remain free of falls  Description: INTERVENTIONS:  - Educate patient/family on patient safety including physical limitations  - Instruct patient to call for assistance with activity   - Consult OT/PT to assist with strengthening/mobility   - Keep Call bell within reach  - Keep bed low and locked with side rails adjusted as appropriate  - Keep care items and personal belongings within reach  - Initiate and maintain comfort rounds  - Make Fall Risk Sign visible to staff  - Offer Toileting every  Hours,  in advance of need  - Initiate/Maintain alarm  - Obtain necessary fall risk management equipment:   - Apply yellow socks and bracelet for high fall risk patients  - Consider moving patient to room near nurses station  Outcome: Progressing  Goal: Maintain or return to baseline ADL function  Description: INTERVENTIONS:  -  Assess patient's ability to carry out ADLs; assess patient's baseline for ADL function and identify physical deficits which impact ability to perform ADLs (bathing, care of mouth/teeth, toileting, grooming, dressing, etc.)  - Assess/evaluate cause of self-care deficits   - Assess range of motion  - Assess patient's mobility; develop plan if impaired  - Assess patient's need for assistive devices and provide as appropriate  - Encourage maximum independence but intervene and supervise when necessary  - Involve family in performance of ADLs  - Assess for home care needs following discharge   - Consider OT consult to assist with ADL evaluation and planning for discharge  - Provide patient education as appropriate  Outcome: Progressing  Goal: Maintains/Returns to pre admission functional level  Description: INTERVENTIONS:  - Perform AM-PAC 6 Click Basic Mobility/ Daily Activity assessment daily.  - Set and communicate daily mobility goal to care team and patient/family/caregiver.   - Collaborate with rehabilitation services on mobility goals if consulted  - Perform Range of Motion  times a day.  - Reposition patient every  hours.  - Dangle patient  times a day  - Stand patient  times a day  - Ambulate patient  times a day  - Out of bed to chair  times a day   - Out of bed for meals  times a day  - Out of bed for toileting  - Record patient progress and toleration of activity level   Outcome: Progressing
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
## 2024-06-09 NOTE — ASSESSMENT & PLAN NOTE
Lab Results   Component Value Date    HGBA1C 9.4 (H) 06/01/2024       Recent Labs     06/08/24  1136 06/08/24  1553 06/08/24  2102 06/09/24  0804   POCGLU 139 209* 102 138         Blood Sugar Average: Last 72 hrs:  (P) 138.2033418474873544    Home Medications: Metformin, unclear if patient is taking it.  6/1: A1c is 9.4    Plan:  SSI while inpatient  Continue Lantus 32 units, qhs   Continue Humalog 10 units,TID with meals   Glucose checks and Insulin correction [facility]  Hypoglycemia protocol.
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
## 2024-06-10 ENCOUNTER — TELEPHONE (OUTPATIENT)
Dept: NEUROSURGERY | Facility: CLINIC | Age: 74
End: 2024-06-10

## 2024-06-10 VITALS
SYSTOLIC BLOOD PRESSURE: 128 MMHG | WEIGHT: 207.89 LBS | TEMPERATURE: 98.1 F | HEIGHT: 70 IN | DIASTOLIC BLOOD PRESSURE: 87 MMHG | RESPIRATION RATE: 20 BRPM | OXYGEN SATURATION: 93 % | HEART RATE: 121 BPM | BODY MASS INDEX: 29.76 KG/M2

## 2024-06-10 DIAGNOSIS — M46.26 OSTEOMYELITIS OF LUMBAR SPINE (HCC): Primary | ICD-10-CM

## 2024-06-10 LAB
ANION GAP SERPL CALCULATED.3IONS-SCNC: 6 MMOL/L (ref 4–13)
BUN SERPL-MCNC: 12 MG/DL (ref 5–25)
CALCIUM SERPL-MCNC: 8.8 MG/DL (ref 8.4–10.2)
CHLORIDE SERPL-SCNC: 104 MMOL/L (ref 96–108)
CO2 SERPL-SCNC: 23 MMOL/L (ref 21–32)
CREAT SERPL-MCNC: 0.92 MG/DL (ref 0.6–1.3)
GFR SERPL CREATININE-BSD FRML MDRD: 81 ML/MIN/1.73SQ M
GLUCOSE SERPL-MCNC: 103 MG/DL (ref 65–140)
GLUCOSE SERPL-MCNC: 129 MG/DL (ref 65–140)
GLUCOSE SERPL-MCNC: 136 MG/DL (ref 65–140)
GLUCOSE SERPL-MCNC: 137 MG/DL (ref 65–140)
INR PPP: 1.05 (ref 0.84–1.19)
POTASSIUM SERPL-SCNC: 4 MMOL/L (ref 3.5–5.3)
PROTHROMBIN TIME: 14.3 SECONDS (ref 11.6–14.5)
SODIUM SERPL-SCNC: 133 MMOL/L (ref 135–147)

## 2024-06-10 PROCEDURE — 99239 HOSP IP/OBS DSCHRG MGMT >30: CPT | Performed by: INTERNAL MEDICINE

## 2024-06-10 PROCEDURE — 99232 SBSQ HOSP IP/OBS MODERATE 35: CPT | Performed by: INTERNAL MEDICINE

## 2024-06-10 PROCEDURE — 85610 PROTHROMBIN TIME: CPT | Performed by: RADIOLOGY

## 2024-06-10 PROCEDURE — 80048 BASIC METABOLIC PNL TOTAL CA: CPT

## 2024-06-10 PROCEDURE — 02HV33Z INSERTION OF INFUSION DEVICE INTO SUPERIOR VENA CAVA, PERCUTANEOUS APPROACH: ICD-10-PCS | Performed by: INTERNAL MEDICINE

## 2024-06-10 PROCEDURE — C1751 CATH, INF, PER/CENT/MIDLINE: HCPCS

## 2024-06-10 PROCEDURE — 82948 REAGENT STRIP/BLOOD GLUCOSE: CPT

## 2024-06-10 RX ORDER — TIZANIDINE 2 MG/1
2 TABLET ORAL 2 TIMES DAILY PRN
Qty: 10 TABLET | Refills: 0 | Status: SHIPPED | OUTPATIENT
Start: 2024-06-10 | End: 2024-06-15

## 2024-06-10 RX ORDER — BLOOD SUGAR DIAGNOSTIC
STRIP MISCELLANEOUS
Qty: 100 EACH | Refills: 0 | Status: SHIPPED | OUTPATIENT
Start: 2024-06-10

## 2024-06-10 RX ORDER — GLUCOSAMINE HCL/CHONDROITIN SU 500-400 MG
CAPSULE ORAL
Qty: 100 EACH | Refills: 0 | Status: SHIPPED | OUTPATIENT
Start: 2024-06-10

## 2024-06-10 RX ORDER — METRONIDAZOLE 500 MG/1
500 TABLET ORAL EVERY 8 HOURS SCHEDULED
Qty: 105 TABLET | Refills: 0 | Status: SHIPPED | OUTPATIENT
Start: 2024-06-10 | End: 2024-07-15

## 2024-06-10 RX ORDER — PEN NEEDLE, DIABETIC 32GX 5/32"
NEEDLE, DISPOSABLE MISCELLANEOUS
Qty: 100 EACH | Refills: 0 | Status: SHIPPED | OUTPATIENT
Start: 2024-06-10

## 2024-06-10 RX ORDER — INSULIN DEGLUDEC 100 U/ML
32 INJECTION, SOLUTION SUBCUTANEOUS
Qty: 15 ML | Refills: 0 | Status: SHIPPED | OUTPATIENT
Start: 2024-06-10

## 2024-06-10 RX ORDER — BLOOD-GLUCOSE METER
KIT MISCELLANEOUS
Qty: 1 KIT | Refills: 0 | Status: SHIPPED | OUTPATIENT
Start: 2024-06-10

## 2024-06-10 RX ORDER — INSULIN LISPRO 100 [IU]/ML
10 INJECTION, SOLUTION INTRAVENOUS; SUBCUTANEOUS
Qty: 15 ML | Refills: 0 | Status: SHIPPED | OUTPATIENT
Start: 2024-06-10

## 2024-06-10 RX ORDER — LANCETS 33 GAUGE
EACH MISCELLANEOUS
Qty: 100 EACH | Refills: 0 | Status: SHIPPED | OUTPATIENT
Start: 2024-06-10

## 2024-06-10 RX ADMIN — METRONIDAZOLE 500 MG: 500 TABLET ORAL at 14:02

## 2024-06-10 RX ADMIN — DICLOFENAC SODIUM 2 G: 10 GEL TOPICAL at 12:21

## 2024-06-10 RX ADMIN — DICLOFENAC SODIUM 2 G: 10 GEL TOPICAL at 18:23

## 2024-06-10 RX ADMIN — METRONIDAZOLE 500 MG: 500 TABLET ORAL at 05:14

## 2024-06-10 RX ADMIN — ACETAMINOPHEN 975 MG: 325 TABLET, FILM COATED ORAL at 14:02

## 2024-06-10 RX ADMIN — INSULIN LISPRO 10 UNITS: 100 INJECTION, SOLUTION INTRAVENOUS; SUBCUTANEOUS at 19:08

## 2024-06-10 RX ADMIN — INSULIN LISPRO 10 UNITS: 100 INJECTION, SOLUTION INTRAVENOUS; SUBCUTANEOUS at 12:18

## 2024-06-10 RX ADMIN — ACETAMINOPHEN 975 MG: 325 TABLET, FILM COATED ORAL at 05:14

## 2024-06-10 RX ADMIN — LIDOCAINE 5% 1 PATCH: 700 PATCH TOPICAL at 09:00

## 2024-06-10 RX ADMIN — CEFTRIAXONE 2000 MG: 2 INJECTION, POWDER, FOR SOLUTION INTRAMUSCULAR; INTRAVENOUS at 04:05

## 2024-06-10 NOTE — PROCEDURES
Venous Access Line Insertion    Date/Time: 6/10/2024 12:19 PM    Performed by: Celi Ribera RN  Authorized by: Ruchi Meyer MD    Patient location:  Bedside  Consent:     Consent obtained:  Written    Consent given by:  Patient    Risks discussed:  Arterial puncture, incorrect placement, nerve damage, pneumothorax, bleeding and infection    Alternatives discussed: discussed with md.  Universal protocol:     Procedure explained and questions answered to patient or proxy's satisfaction: yes      Immediately prior to procedure, a time out was called: yes      Relevant documents present and verified: yes      Test results available and properly labeled: yes      Site/side marked: yes      Patient identity confirmed:  Verbally with patient, arm band and hospital-assigned identification number  Pre-procedure details:     Hand hygiene: Hand hygiene performed prior to insertion      Sterile barrier technique: All elements of maximal sterile technique followed      Skin preparation:  ChloraPrep  Procedure details:     Complex Venous Access Line Type: PICC      Complex Venous Access Line Indications: long term antibiotics      Catheter tip vessel location: atriocaval junction      Orientation:  Right    Location:  Basilic    Catheter size:  4 Fr (ref# [id number], lot #[id number], exp 3/31/25)    Total catheter length (cm):  44    Catheter out on skin (cm):  0    Max flow rate:  999ml/hr    Arm circumference:  32cm    Patient evaluated for contraindications to access (i.e. fistula, thrombosis, etc): Yes      Approach: percutaneous technique used      Patient position:  Flat    Ultrasound image availability:  Still images obtained (3cg confirmed)    Sterile ultrasound techniques: Sterile gel and sterile probe covers were used      Number of attempts:  1    Successful placement: yes      Landmarks identified: yes      Vessel of catheter tip end:  [place] 3CG confirmed  Anesthesia (see MAR for exact dosages):      Anesthesia method:  Local infiltration    Local anesthetic:  Lidocaine 1% w/o epi (1ml)  Post-procedure details:     Post-procedure:  Securement device placed and dressing applied    Assessment:  Blood return through all ports    Post-procedure complications: none      Patient tolerance of procedure:  Tolerated well, no immediate complications

## 2024-06-10 NOTE — CASE MANAGEMENT
Case Management Discharge Planning Note    Patient name Capo Pepper  Location W /W -01 MRN [id number]  : 1950 Date 6/10/2024       Current Admission Date: 2024  Current Admission Diagnosis:Bacteremia   Patient Active Problem List    Diagnosis Date Noted Date Diagnosed    Osteomyelitis of lumbar spine (HCC) 2024     Bacteremia 2024     Ambulatory dysfunction 2024     Acute hypoxemic respiratory failure due to COVID-19 (HCC) 2022     Hyponatremia 2022     Nontraumatic complete tear of right rotator cuff 2021     Biceps tendinopathy of right upper extremity 2021     Primary osteoarthritis of right knee 2021     Acute pain of right shoulder 2021     Dyspnea 2021     Stroke due to embolism of left middle cerebral artery ([facility]) 2020     Type 2 diabetes mellitus ([facility]) 2020     Pleural effusion 2020     Cardiomyopathy ([facility]) 2020     Coronary artery disease involving native coronary artery of native heart without angina pectoris 2020     Mixed hyperlipidemia 2020     Precordial pain 2016     Chronic low back pain 2016     Chronic combined systolic and diastolic congestive heart failure (HCC) 2016     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HCC) 2016     EKG, abnormal 2016       LOS (days): 9  Geometric Mean LOS (GMLOS) (days): 5.9  Days to GMLOS:-3     OBJECTIVE:  Risk of Unplanned Readmission Score: 11.37         Current admission status: Inpatient   Preferred Pharmacy:   [id number] - [place] [address]  [address] [facility]  [place] [address]  Phone: [phone] Fax: [phone]    Primary Care Provider: Vishal Arango MD    Primary Insurance: MEDICARE  Secondary Insurance: AET[employer]    DISCHARGE DETAILS:    Discharge planning discussed with:: patient at bedside, wife Miguel Ángel over phone  Freedom of Choice: Yes  Comments - Freedom of Choice: Shira GOMEZ CM contacted  family/caregiver?: Yes  Were Treatment Team discharge recommendations reviewed with patient/caregiver?: Yes  Did patient/caregiver verbalize understanding of patient care needs?: Yes  Were patient/caregiver advised of the risks associated with not following Treatment Team discharge recommendations?: Yes    Contacts  Patient Contacts: Miguel Ángel  Relationship to Patient:: Family (wife)  Contact Method: Phone (patient called wife, on his cell at bedside)  Reason/Outcome: Continuity of Care, Emergency Contact, Referral, Discharge Planning    Requested Home Health Care         Is the patient interested in HHC at discharge?: Yes  Home Health Discipline requested:: Nursing  Home Health Agency Name:: [facility] Follow-Up Provider:: HENRIQUE  Home Health Services Needed:: Administration of IV, IM or SC Medications  Homebound Criteria Met:: Requires the Assistance of Another Person for Safe Ambulation or to Leave the Home  Supporting Clincal Findings:: Limited Endurance    DME Referral Provided  Referral made for DME?: No    Other Referral/Resources/Interventions Provided:  Interventions: Home Infusion, HHC  Referral Comments: CM confirmed with [facility] that they will drop off antibiotic at bedside this afternoon-- nurse aware patient must leave with this. Confirmed with [facility] they can come tomorrow at 10:00AM for SOC visit-- info added to AVS. CM met with patient at bedside to discuss, patient called wife Miguel Ángel to include her in conversation. CM notified both patient and wife of above-- both expressed understanding and agreeable to plan. CM stating they will add info on [facility] on AVS, as patient is needing dental work and does not have dental insurance-- info added at this time. No further CM needs anticipated  at this time.    Would you like to participate in our [facility] service program?  : No - Declined    Treatment Team Recommendation: Home with Home Health Care  Discharge Destination Plan:: Home  with Home Health Care  Transport at Discharge : Family    IMM Given (Date):: 06/10/24  IMM Given to:: Patient (copy provided)  IMM reviewed with patient, patient agrees with discharge determination.
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
## 2024-06-10 NOTE — ASSESSMENT & PLAN NOTE
Wt Readings from Last 3 Encounters:   06/05/24 94.3 kg (207 lb 14.3 oz)   01/14/22 96 kg (211 lb 10.3 oz)   08/26/21 99.8 kg (220 lb)     Assessment:  Echo January 2021: EF 25%, severe diffuse hypokinesis with regional variations. G2DD.  Home medications: Ramipril and spironolactone.  Patient is not volume overloaded on exam.  Echocardiogram this admission: EF 55%.  Normal systolic function. No evidence of vegetations.    Plan:   To follow-up with his primary care provider.

## 2024-06-10 NOTE — PROGRESS NOTES
Progress Note - Infectious Disease   Capo Pepper 74 y.o. male MRN: [id number]  Unit/Bed#: W -01 Encounter: [id number]      Impression/Plan:  1.  Bacteroides bacteremia.  Patient initially presented for problem #2 and 1 of 2 blood cultures is now returned positive with Bacteroides.  Suspect occult dental source leading to #2.  CT of the face with multiple dental issues.  Could also consider occult GI source without prior colonoscopy.  Overall suspect polymicrobial process contributing to #2.  2D echo limited but grossly unremarkable.  Repeat cultures negative.  No plans for SHANELLE without other organisms isolated on blood cultures as it would not  currently.  Continue Ceftriaxone and flagyl as below  Plan for follow-up with OMFS as outpatient  Neurosurgical evaluation noted, follow-up outpatient  Recommend GI evaluation outpatient for colonoscopy  Patient requested new PCP  IR biopsy deferred at this time  Continue to trend for curve/vitals  Monitor labs while admitted  Supportive care/discharge per primary     2.  Acute on chronic back pain and osteomyelitis of L2-3 with epidural abscess and myositis.  Patient has had chronic back pain since the 1970s.  He has been having worsening spasming now for the last 2 weeks.  Has overall been feeling unwell systemically now for 2 months.  Suspect secondary seeding of this site as above.  MRI confirmed OM at L2-3 with epidural abscess and myositis.  No plans for intervention per neurosurgery.  IR biopsy deferred given concern for poor yield that may not outweigh risk of procedure on discussion with IR attending.  Baseline ESR/CRP reviewed.  Anticipate prolonged antibiotic course until abscess resolution on imaging and improvement in inflammatory markers.  Continue ceftriaxone 2 g every 24 hours with Flagyl 500 mg every 8 hours  Neurosurgical evaluation noted, follow-up outpatient  Outpatient follow-up with OMFS/PCP/GI  IV antibiotic prescription  provided to case management  Tentative plan for 6 weeks of therapy through 7/15  Weekly labs with CBCD, CMP, ESR and CRP as outpatient  Repeat MRI L-spine with and without contrast, week of 7/1  May need to extend IV course until abscess resolution  Possible oral course depending on symptoms/inflammatory markers after IV therapy  Plan for follow-up in the ID office 2 weeks after discharge  ID office staff notified of the above  Patient cleared for PICC line  Trend fever curve/WBC while admitted  Additional supportive care/discharge per primary     3.  Type 2 diabetes.  Hemoglobin A1c of 9.4.  Likely risk factor of the above.  Ongoing glucose management per primary     4.  Leukocytosis.  Noted on presentation and I suspect is due to the above.  Fortunately improving.  Continue antibiotics as above.      Above plan discussed in detail with the patient at bedside  Above plan discussed with IR attending earlier this morning in terms of risks and benefits of biopsy  Above plan discussed in detail with primary service resident who is aware of plans for transition now to outpatient therapy and follow-up    ID consult service will continue to follow while admitted.     Antibiotics:  Ceftriaxone/Flagyl 7    24 Hour events:  Yesterday and overnight notes reviewed and no acute events noted.  Case discussed with IR attending and based on imaging concern overall is for low yield on the patient's cultures.  Plans at this time are to defer biopsy unless patient clinically declines.    Subjective:  Seen at bedside and he denied having any nausea, vomiting, chest pain.  Back pain waxing and waning.  Otherwise tolerating antibiotics.  Plan is for home infusion.  We discussed at this point placement of PICC line and arranging visiting nurse and follow-up given IR biopsy has been deferred.    Objective:  Vitals:  Temp:  [94.2 °F (34.6 °C)-98.4 °F (36.9 °C)] 94.2 °F (34.6 °C)  HR:  [] 114  Resp:  [16-18] 16  BP: (114-149)/(81-84)  114/81  SpO2:  [93 %-94 %] 93 %  Temp (24hrs), Av °F (36.1 °C), Min:94.2 °F (34.6 °C), Max:98.4 °F (36.9 °C)  Current: Temperature: (!) 94.2 °F (34.6 °C)    Physical Exam:   General Appearance:  Alert, interactive, nontoxic, no acute distress.   Throat: Oropharynx moist without lesions.    Abdomen:   Soft, non-tender, non-distended, positive bowel sounds.     Extremities: No clubbing, cyanosis or edema; patient able to ambulate on his own with minimal assistance from his cane.   Skin: No new rashes or lesions. No new draining wounds noted.       Labs, Imaging, & Other studies:   All pertinent labs and imaging studies in PACS were personally reviewed as below.  Results from last 7 days   Lab Units 24  0518 24  0444   WBC Thousand/uL 9.97 8.38   HEMOGLOBIN g/dL 12.0 11.8*   PLATELETS Thousands/uL 361 377     Results from last 7 days   Lab Units 06/10/24  0759   POTASSIUM mmol/L 4.0   CHLORIDE mmol/L 104   CO2 mmol/L 23   BUN mg/dL 12   CREATININE mg/dL 0.92   EGFR ml/min/1.73sq m 81   CALCIUM mg/dL 8.8           Lab interpretation/comments: Chemistry unremarkable    Imaging interpretation/comments: No new imaging    Culture data: No new cultures    External notes: None

## 2024-06-10 NOTE — TELEPHONE ENCOUNTER
6/12/24:  SPOKE TO PRIMO (WIFE) CONFIRMED OV: DATE / TIME /  ADDRESS / IMAGING.    6/11/24: DISCHARGED HOME    6/10/24: INPATIENT    2WK HFU W/ AP  6/24/24 / 1:00 / [facility]    IMAGING  XRAY LSPINE    PER: FRANCISCO Burris; Meliza Smiley  PER [facility] 2 W[facility]OSP F/U WITH AP WITH LSPINE XRAYS.    PT STILL IN HOSP

## 2024-06-10 NOTE — ASSESSMENT & PLAN NOTE
Assessment:  Blood culture x 1 Bacteroides fragilis.  Reported having infected teeth for years.  Patient is afebrile.  Leukocytosis.  CT facial bones did not show any dental abscess.  MRI: acute discitis/osteomyelitis at L2-L3, 7.0 cm ventral epidural abscess spanning from mid L1 to mid L3 levels, enhancing epidural phlegmonous change extending into left L2-L3 foramen, associated moderate canal stenosis at L2-L3 levels and mild-to-moderate left L2-L3 foraminal narrowing.   Second set of blood cultures negative after 4 days    Plan  Bone biopsy was deferred by IR at this point.  ID were following. Will continue ceftriaxone and Flagyl till 7/15/2024.  IV antibiotics is set up with home health via our case management.  ID follow-up outpatient.  Neurosurgery follow-up outpatient.  Oral maxillofacial surgery follow-up outpatient.  Gastroenterology follow-up for colonoscopy outpatient.

## 2024-06-10 NOTE — ASSESSMENT & PLAN NOTE
Lab Results   Component Value Date    HGBA1C 9.4 (H) 06/01/2024       Recent Labs     06/09/24  2050 06/10/24  0831 06/10/24  1112 06/10/24  1604   POCGLU 111 137 129 103         Blood Sugar Average: Last 72 hrs:  (P) 141.4    Home Medications: Metformin, unclear if patient is taking it.  6/1: A1c is 9.4    Plan:  He was discharged on his inpatient regimen: Lantus 32 units, lispro 10 units with meals.  Ambulatory referral to diabetes education.  Patient was instructed on insulin injections by our nurse.  To follow-up with his primary care provider for proper control of his diabetes.

## 2024-06-10 NOTE — ASSESSMENT & PLAN NOTE
Recent Labs     06/08/24  0518 06/09/24  1050 06/10/24  0759   SODIUM 133* 133* 133*        Assessment:  Reported decreased p.o. intake for the past few weeks.   corrected for hyperglycemia during admission.    Serum osmolality 282, urine osmolality 310, urine sodium 23.  Improved.     Plan:   Stable hyponatremia on discharge.  BMP within 1 week of discharge by his primary care provider.

## 2024-06-10 NOTE — PLAN OF CARE
Problem: PAIN - ADULT  Goal: Verbalizes/displays adequate comfort level or baseline comfort level  Description: Interventions:  - Encourage patient to monitor pain and request assistance  - Assess pain using appropriate pain scale  - Administer analgesics based on type and severity of pain and evaluate response  - Implement non-pharmacological measures as appropriate and evaluate response  - Consider cultural and social influences on pain and pain management  - Notify physician/advanced practitioner if interventions unsuccessful or patient reports new pain  Outcome: Progressing     Problem: INFECTION - ADULT  Goal: Absence or prevention of progression during hospitalization  Description: INTERVENTIONS:  - Assess and monitor for signs and symptoms of infection  - Monitor lab/diagnostic results  - Monitor all insertion sites, i.e. indwelling lines, tubes, and drains  - Monitor endotracheal if appropriate and nasal secretions for changes in amount and color  - [place] appropriate cooling/warming therapies per order  - Administer medications as ordered  - Instruct and encourage patient and family to use good hand hygiene technique  - Identify and instruct in appropriate isolation precautions for identified infection/condition  Outcome: Progressing  Goal: Absence of fever/infection during neutropenic period  Description: INTERVENTIONS:  - Monitor WBC    Outcome: Progressing     Problem: SAFETY ADULT  Goal: Patient will remain free of falls  Description: INTERVENTIONS:  - Educate patient/family on patient safety including physical limitations  - Instruct patient to call for assistance with activity   - Consult OT/PT to assist with strengthening/mobility   - Keep Call bell within reach  - Keep bed low and locked with side rails adjusted as appropriate  - Keep care items and personal belongings within reach  - Initiate and maintain comfort rounds  - Make Fall Risk Sign visible to staff  - Apply yellow socks and bracelet  for high fall risk patients  - Consider moving patient to room near nurses station  Outcome: Progressing  Goal: Maintain or return to baseline ADL function  Description: INTERVENTIONS:  -  Assess patient's ability to carry out ADLs; assess patient's baseline for ADL function and identify physical deficits which impact ability to perform ADLs (bathing, care of mouth/teeth, toileting, grooming, dressing, etc.)  - Assess/evaluate cause of self-care deficits   - Assess range of motion  - Assess patient's mobility; develop plan if impaired  - Assess patient's need for assistive devices and provide as appropriate  - Encourage maximum independence but intervene and supervise when necessary  - Involve family in performance of ADLs  - Assess for home care needs following discharge   - Consider OT consult to assist with ADL evaluation and planning for discharge  - Provide patient education as appropriate  Outcome: Progressing  Goal: Maintains/Returns to pre admission functional level  Description: INTERVENTIONS:  - Perform AM-PAC 6 Click Basic Mobility/ Daily Activity assessment daily.  - Set and communicate daily mobility goal to care team and patient/family/caregiver.   - Collaborate with rehabilitation services on mobility goals if consulted  - Out of bed for toileting  - Record patient progress and toleration of activity level   Outcome: Progressing     Problem: DISCHARGE PLANNING  Goal: Discharge to home or other facility with appropriate resources  Description: INTERVENTIONS:  - Identify barriers to discharge w/patient and caregiver  - Arrange for needed discharge resources and transportation as appropriate  - Identify discharge learning needs (meds, wound care, etc.)  - Arrange for interpretive services to assist at discharge as needed  - Refer to Case Management Department for coordinating discharge planning if the patient needs post-hospital services based on physician/advanced practitioner order or complex needs  related to functional status, cognitive ability, or social support system  Outcome: Progressing     Problem: Knowledge Deficit  Goal: Patient/family/caregiver demonstrates understanding of disease process, treatment plan, medications, and discharge instructions  Description: Complete learning assessment and assess knowledge base.  Interventions:  - Provide teaching at level of understanding  - Provide teaching via preferred learning methods  Outcome: Progressing

## 2024-06-10 NOTE — PLAN OF CARE
Problem: PAIN - ADULT  Goal: Verbalizes/displays adequate comfort level or baseline comfort level  Description: Interventions:  - Encourage patient to monitor pain and request assistance  - Assess pain using appropriate pain scale  - Administer analgesics based on type and severity of pain and evaluate response  - Implement non-pharmacological measures as appropriate and evaluate response  - Consider cultural and social influences on pain and pain management  - Notify physician/advanced practitioner if interventions unsuccessful or patient reports new pain  Outcome: Progressing     Problem: INFECTION - ADULT  Goal: Absence or prevention of progression during hospitalization  Description: INTERVENTIONS:  - Assess and monitor for signs and symptoms of infection  - Monitor lab/diagnostic results  - Monitor all insertion sites, i.e. indwelling lines, tubes, and drains  - Monitor endotracheal if appropriate and nasal secretions for changes in amount and color  - [place] appropriate cooling/warming therapies per order  - Administer medications as ordered  - Instruct and encourage patient and family to use good hand hygiene technique  - Identify and instruct in appropriate isolation precautions for identified infection/condition  Outcome: Progressing  Goal: Absence of fever/infection during neutropenic period  Description: INTERVENTIONS:  - Monitor WBC    Outcome: Progressing     Problem: SAFETY ADULT  Goal: Patient will remain free of falls  Description: INTERVENTIONS:  - Educate patient/family on patient safety including physical limitations  - Instruct patient to call for assistance with activity   - Consult OT/PT to assist with strengthening/mobility   - Keep Call bell within reach  - Keep bed low and locked with side rails adjusted as appropriate  - Keep care items and personal belongings within reach  - Initiate and maintain comfort rounds  - Make Fall Risk Sign visible to staff  - Offer Toileting every 2 Hours,  in advance of need  - Initiate/Maintain bed alarm  - Obtain necessary fall risk management equipment:   - Apply yellow socks and bracelet for high fall risk patients  - Consider moving patient to room near nurses station  Outcome: Progressing  Goal: Maintain or return to baseline ADL function  Description: INTERVENTIONS:  -  Assess patient's ability to carry out ADLs; assess patient's baseline for ADL function and identify physical deficits which impact ability to perform ADLs (bathing, care of mouth/teeth, toileting, grooming, dressing, etc.)  - Assess/evaluate cause of self-care deficits   - Assess range of motion  - Assess patient's mobility; develop plan if impaired  - Assess patient's need for assistive devices and provide as appropriate  - Encourage maximum independence but intervene and supervise when necessary  - Involve family in performance of ADLs  - Assess for home care needs following discharge   - Consider OT consult to assist with ADL evaluation and planning for discharge  - Provide patient education as appropriate  Outcome: Progressing  Goal: Maintains/Returns to pre admission functional level  Description: INTERVENTIONS:  - Perform AM-PAC 6 Click Basic Mobility/ Daily Activity assessment daily.  - Set and communicate daily mobility goal to care team and patient/family/caregiver.   - Collaborate with rehabilitation services on mobility goals if consulted  - Perform Range of Motion 2 times a day.  - Reposition patient every 2 hours.  - Dangle patient 2 times a day  - Stand patient 2 times a day  - Ambulate patient 2 times a day  - Out of bed to chair 2 times a day   - Out of bed for meals 2 times a day  - Out of bed for toileting  - Record patient progress and toleration of activity level   Outcome: Progressing

## 2024-06-10 NOTE — DISCHARGE INSTR - AVS FIRST PAGE
Dear Capo Pepper,     It was our pleasure to care for you here at [facility].  It is our hope that we were always able to exceed the expected standards for your care during your stay.  You were hospitalized due to back pain.  You were cared for on the forth floor by Sirena Kauffman MD under the service of Ruchi Meyer MD with the [facility] who covers for your primary care physician (PCP), Vishal Arango MD, while you were hospitalized.  If you have any questions or concerns related to this hospitalization, you may contact us at .  For follow up as well as any medication refills, we recommend that you follow up with your primary care physician.  A registered nurse will reach out to you by phone within a few days after your discharge to answer any additional questions that you may have after going home.  However, at this time we provide for you here, the most important instructions / recommendations at discharge:     Notable Medication Adjustments -   START taking Flagyl 500 mg daily one tablet every 8 hours starting today until 7/15/2024.   Your IV antibiotic ceftriaxone is set up until 7/15/2024. Nurse visit will start on 06/11/24  Starting tonight Please inject insulin Lantus 32 units at bedtime daily.  Inject lispro insulin 10 units before meals, if you are skipping a meal you do not need to inject.  Please check your glucose 3 times daily.  Take tizanidine 2 mg as needed for lower back muscle spasms. Please be aware that you cannot drive after taking it.  Testing Required after Discharge -   You need weekly CBC, CMP, ESR, and CRP that are set up by infectious disease.  ** Please contact your PCP to request testing orders for any of the testing recommended here **  Important follow up information -   Please follow-up with your primary care provider within 1 week of discharge.  Call to make an appointment and establish care.  Your  diabetes needs to be managed with your primary care provider.  Please follow-up with infectious disease.  Call to make an appointment.  Please follow-up with oral maxillofacial surgery.  Call to make an appointment.  Please follow-up with neurosurgery.  Call to make an appointment.  Please follow-up with diabetic education for more training on injecting insulin.  Other Instructions -   If you experience worsening back pain, fever, or weakness of legs please return back to the ED.  Please review this entire after visit summary as additional general instructions including medication list, appointments, activity, diet, any pertinent wound care, and other additional recommendations from your care team that may be provided for you.      Sincerely,     Sirena Kauffman MD

## 2024-06-10 NOTE — QUICK NOTE
Interventional Radiology  Consultation 6/10/2024     Consults  Capo Pepper   1950   [id number]      Assessment/Plan:  74 year old male admitted with chills and back spasms. Found to have probable L2-L3 discitis/osteomyelitis and anterior epidural fluid collection concerning for abscess.     Imaging reviewed, abscess can not be drained due to location. Disc space aspiration low yield while patient is on antibiotic therapy. Would complete antibiotic treatment and follow up with imaging. If discitis/osteomyelitis and abscess are not improving or show progression then would recommend aspiration vs biopsy.    Patient reports improvement in back spasms and no longer has chills once starting on antibiotics.    This plan was discussed with both ID and the patient.     Medical Problems       Problem List       * (Principal) Bacteremia    Precordial pain    Chronic low back pain    Chronic combined systolic and diastolic congestive heart failure (HCC)    Wt Readings from Last 3 Encounters:   06/05/24 94.3 kg (207 lb 14.3 oz)   01/14/22 96 kg (211 lb 10.3 oz)   08/26/21 99.8 kg (220 lb)                 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HCC)    EKG, abnormal    Cardiomyopathy (HCC)    Coronary artery disease involving native coronary artery of native heart without angina pectoris    Mixed hyperlipidemia    Stroke due to embolism of left middle cerebral artery (HCC)    Type 2 diabetes mellitus (HCC)    Lab Results   Component Value Date    HGBA1C 9.4 (H) 06/01/2024       Recent Labs     06/09/24  1113 06/09/24  1614 06/09/24  2050 06/10/24  0831   POCGLU 138 202* 111 137       Blood Sugar Average: Last 72 hrs:  (P) 145.8661856397385629        Pleural effusion    Dyspnea    Acute pain of right shoulder    Primary osteoarthritis of right knee    Nontraumatic complete tear of right rotator cuff    Biceps tendinopathy of right upper extremity    Acute hypoxemic respiratory failure due to COVID-19 (HCC)    Hyponatremia    Ambulatory  dysfunction    Osteomyelitis of lumbar spine ([facility])            Subjective:     Patient ID: Capo Pepper is a 74 y.o. male.    History of Present Illness  74 year old male admitted with chills and back spasms. Found to have probable L2-L3 discitis/osteomyelitis and anterior epidural fluid collection concerning for abscess.     Review of Systems  not performed    Past Medical History:   Diagnosis Date    Atypical chest pain     Back pain     CHF (congestive heart failure) ([facility])     Constipation 2024    Legionnaire's disease ([facility])     Leukocytosis 2024    Lung disorder     Migraine         Past Surgical History:   Procedure Laterality Date    ROTATOR CUFF REPAIR Bilateral     TONSILLECTOMY      TRIGGER FINGER RELEASE          Social History     Tobacco Use   Smoking Status Former    Current packs/day: 0.00    Average packs/day: 1 pack/day for 25.0 years (25.0 ttl pk-yrs)    Types: Cigarettes, Cigars    Start date:     Quit date:     Years since quittin.4   Smokeless Tobacco Never        Social History     Substance and Sexual Activity   Alcohol Use Yes        Social History     Substance and Sexual Activity   Drug Use Not Currently    Comment: CBD Oil pt stated no longer using        Allergies   Allergen Reactions    Poultry Meal - Food Allergy        Current Facility-Administered Medications   Medication Dose Route Frequency Provider Last Rate Last Admin    acetaminophen (TYLENOL) tablet 975 mg  975 mg Oral Q8H [facility] Luciana Fritz MD   975 mg at 06/10/24 0514    bisacodyl (DULCOLAX) rectal suppository 10 mg  10 mg Rectal Daily PRN Basanta Nba, DO   10 mg at 24 0852    cefTRIAXone (ROCEPHIN) 2,000 mg in dextrose 5 % 50 mL IVPB  2,000 mg Intravenous Q24H Laurie Li PA-C 100 mL/hr at 06/10/24 0405 2,000 mg at 06/10/24 0405    Diclofenac Sodium (VOLTAREN) 1 % topical gel 2 g  2 g Topical 4x Daily Basanta Nba, DO   2 g at 24 0819    insulin glargine (LANTUS)  "subcutaneous injection 32 Units 0.32 mL  32 Units Subcutaneous HS Debby Steven MD   32 Units at 06/09/24 2158    insulin lispro (HumALOG/ADMELOG) 100 units/mL subcutaneous injection 1-6 Units  1-6 Units Subcutaneous 4x Daily (AC & HS) Jayne Arango DO   2 Units at 06/09/24 1746    insulin lispro (HumALOG/ADMELOG) 100 units/mL subcutaneous injection 10 Units  10 Units Subcutaneous TID With Meals Debby Steven MD   10 Units at 06/09/24 1747    lidocaine (LIDODERM) 5 % patch 1 patch  1 patch Topical Daily Farida Fritz MD   1 patch at 06/08/24 0812    methocarbamol (ROBAXIN) tablet 500 mg  500 mg Oral Q6H PRN Ruchi Meyer MD   500 mg at 06/09/24 2214    metroNIDAZOLE (FLAGYL) tablet 500 mg  500 mg Oral Q8H TU Debby Steven MD   500 mg at 06/10/24 0514    polyethylene glycol (MIRALAX) packet 17 g  17 g Oral Daily Alee Hernandez MD   17 g at 06/07/24 0929    senna-docusate sodium (SENOKOT S) 8.6-50 mg per tablet 1 tablet  1 tablet Oral BID Jayne Arango DO   1 tablet at 06/08/24 1821          Objective:    Vitals:    06/09/24 1615 06/09/24 2224 06/10/24 0754 06/10/24 0755   BP: 149/84 130/81 114/81 114/81   Pulse: (!) 107 95 (!) 112 (!) 114   Resp: 18 16     Temp: 98.4 °F (36.9 °C) 98.4 °F (36.9 °C)  (!) 94.2 °F (34.6 °C)   TempSrc:       SpO2: 93% 94% 93% 93%   Weight:       Height:            Physical Exam  Gen: NAD  HEENT: Normocephalic  Heart: tachycardic  Lungs: Breathing comfortably  Abd: Non-distented  Neuro: Grossly intact    No results found for: \"BNP\"   Lab Results   Component Value Date    WBC 9.97 06/08/2024    HGB 12.0 06/08/2024    HCT 36.5 06/08/2024    MCV 92 06/08/2024     06/08/2024     Lab Results   Component Value Date    INR 1.07 01/14/2022    INR 1.06 11/25/2020    INR 1.04 08/18/2016    PROTIME 13.9 01/14/2022    PROTIME 13.9 11/25/2020    PROTIME 13.4 08/18/2016     Lab Results   Component Value Date    PTT 33 01/14/2022         I have personally " reviewed pertinent imaging and laboratory results.     Code Status: Level 1 - Full Code  Advance Directive and Living Will:      Power of :    POLST:      IR has been consulted to evaluate the patient, determine the appropriate procedure, and whether or not a procedure can and should be performed.    Thank you for allowing me to participate in the care of Capo Pepper. Please don't hesitate to call, text, email, or TigerText with any questions.     This text is generated with voice recognition software. There may be translation, syntax,  or grammatical errors. If you have any questions, please contact the dictating provider.

## 2024-06-11 ENCOUNTER — HOME CARE VISIT (OUTPATIENT)
Dept: HOME HEALTH SERVICES | Facility: HOME HEALTHCARE | Age: 74
End: 2024-06-11
Payer: MEDICARE

## 2024-06-11 ENCOUNTER — TELEPHONE (OUTPATIENT)
Dept: INFECTIOUS DISEASES | Facility: CLINIC | Age: 74
End: 2024-06-11

## 2024-06-11 VITALS
HEART RATE: 115 BPM | SYSTOLIC BLOOD PRESSURE: 112 MMHG | TEMPERATURE: 96.6 F | OXYGEN SATURATION: 94 % | RESPIRATION RATE: 16 BRPM | DIASTOLIC BLOOD PRESSURE: 76 MMHG

## 2024-06-11 DIAGNOSIS — R78.81 BACTEREMIA: ICD-10-CM

## 2024-06-11 DIAGNOSIS — M46.26 OSTEOMYELITIS OF LUMBAR SPINE (HCC): Primary | ICD-10-CM

## 2024-06-11 PROCEDURE — 10330081 VN NO-PAY CLAIM PROCEDURE

## 2024-06-11 PROCEDURE — 400013 VN SOC

## 2024-06-11 PROCEDURE — G0299 HHS/HOSPICE OF RN EA 15 MIN: HCPCS

## 2024-06-11 NOTE — CASE COMMUNICATION
Back office please fax to PCP Vishal Arango MD Fax: [phone]   Patient Capo Pepper  50  [facility]'s A has admitted your patient to Home Health service with the following disciplines:      SN  Response needed, please respond via Phone [phone] or Secure chat  Primary focus of home health care: hematologic  Patient stated goals of care: Back to work back pain better.   Anticipated visit pattern:  1w5 2w1 6 PRN for d iabetes CHF or PICC complications and next visit date: 24 PICC dressing and labs    See medication list - meds in home differ from AVS: Pharmacy still filling HumaLOG KwikPen. Patient also taking: R-Lipoic Acid 300 mg daily, torsemide 20 mg daily, K-dur 20 meq daily, Red Yeast Rice 2400 mg BID, Metoprolol Succinate 50 mg daily. Patient is not taking spironolactone. Ok to continue?    Significant clinical findings: -120 jerod t reports this is normal when in pain. Did not take metoprolol today or in the hospital. Pain 10/10 to 5/10 in back. Confusion from hx of stroke/global aphasia.  Potential barriers to goal achievement:     Thank you for allowing us to participate in the care of your patient.      Agustin Barker RN

## 2024-06-11 NOTE — PROGRESS NOTES
OPAT NOTE    Supervising/Discharge physician: Faith    Diagnosis: Bacteremia, L Spine OM, Epidural Abscess    Drug:    Ceftriaxone 2g IV Q24  Flagyl 500mg PO Q8    Labs/Frequency: CBCD CMP CRP ESR weekly  MRI scheduled July 7th  End Date: 7/15      Infusion/VNA contact: Mamie    Next appointment: 6/24        MA assigned:  Valerie

## 2024-06-11 NOTE — CASE COMMUNICATION
Secure chat with dischaging resident Sirena Kauffman to call RN.  To report patient has not seen cardiology in years and has not seen PCP in over 2 years.   Patient is tachycardic -120 patient reports this is normal when in pain.  Also was not getting metoprolol in hospital and did not take today.   Patient is taking: R-Lipoic Acid 300 mg daily, torsemide 20 mg daily, K-dur 20 meq daily, Red Yeast Rice 2400 mg BID, Metoprolol Succinat e 50 mg daily. Patient is not taking spironlactone.  Ok to continue?

## 2024-06-11 NOTE — TELEPHONE ENCOUNTER
Contacted, spoke with patient and wife.  Confirmed antibiotic.  Confirmed VNA on board and will draw weekly, non fasting labs.  Patient confirmed MRI on July 7th.  She states she has follow up with us on discharge paperwork.  She is aware that she can call with questions or concerns.

## 2024-06-11 NOTE — CASE COMMUNICATION
TC to patient to report TC from Sirena Kauffman reporting VO: Ok to DC spironlactone and continue Metoprolol, K-Dur, and Demadex. Hold Lipoic Acid and Red Yeast Rice until lipid panels obtained. Patient refuses to stop lipoic acid.

## 2024-06-13 PROCEDURE — G0180 MD CERTIFICATION HHA PATIENT: HCPCS | Performed by: INTERNAL MEDICINE

## 2024-06-13 NOTE — TELEPHONE ENCOUNTER
Contacted patient's wife.  We definitely prefer to follow up with IV abx patient's within 2 weeks of discharge. Perhaps we could do a virtual visit? After the MRI we could then schedule patient for an in person visit.    LM for patient to [facility].

## 2024-06-13 NOTE — TELEPHONE ENCOUNTER
Pts wife states / unable to make appt on Monday , June 24th    Pts wife requesting a call back to assist in R/S.     Thank you

## 2024-06-17 ENCOUNTER — HOME CARE VISIT (OUTPATIENT)
Dept: HOME HEALTH SERVICES | Facility: HOME HEALTHCARE | Age: 74
End: 2024-06-17
Payer: MEDICARE

## 2024-06-17 ENCOUNTER — LAB REQUISITION (OUTPATIENT)
Dept: LAB | Facility: HOSPITAL | Age: 74
End: 2024-06-17
Payer: MEDICARE

## 2024-06-17 ENCOUNTER — APPOINTMENT (OUTPATIENT)
Dept: LAB | Facility: MEDICAL CENTER | Age: 74
End: 2024-06-17
Payer: MEDICARE

## 2024-06-17 VITALS
DIASTOLIC BLOOD PRESSURE: 76 MMHG | SYSTOLIC BLOOD PRESSURE: 118 MMHG | RESPIRATION RATE: 16 BRPM | OXYGEN SATURATION: 94 % | HEART RATE: 82 BPM | TEMPERATURE: 98.1 F

## 2024-06-17 DIAGNOSIS — R53.83 OTHER FATIGUE: ICD-10-CM

## 2024-06-17 DIAGNOSIS — R78.81 BACTEREMIA: ICD-10-CM

## 2024-06-17 DIAGNOSIS — M46.26 OSTEOMYELITIS OF LUMBAR SPINE (HCC): ICD-10-CM

## 2024-06-17 LAB
ALBUMIN SERPL BCP-MCNC: 3.6 G/DL (ref 3.5–5)
ALBUMIN SERPL BCP-MCNC: 3.6 G/DL (ref 3.5–5)
ALP SERPL-CCNC: 50 U/L (ref 34–104)
ALP SERPL-CCNC: 52 U/L (ref 34–104)
ALT SERPL W P-5'-P-CCNC: 11 U/L (ref 7–52)
ALT SERPL W P-5'-P-CCNC: 9 U/L (ref 7–52)
ANION GAP SERPL CALCULATED.3IONS-SCNC: 11 MMOL/L (ref 4–13)
ANION GAP SERPL CALCULATED.3IONS-SCNC: 12 MMOL/L (ref 4–13)
AST SERPL W P-5'-P-CCNC: 14 U/L (ref 13–39)
AST SERPL W P-5'-P-CCNC: 16 U/L (ref 13–39)
BASOPHILS # BLD AUTO: 0.07 THOUSANDS/ÂΜL (ref 0–0.1)
BASOPHILS # BLD AUTO: 0.08 THOUSANDS/ÂΜL (ref 0–0.1)
BASOPHILS NFR BLD AUTO: 1 % (ref 0–1)
BASOPHILS NFR BLD AUTO: 1 % (ref 0–1)
BILIRUB SERPL-MCNC: 0.43 MG/DL (ref 0.2–1)
BILIRUB SERPL-MCNC: 0.45 MG/DL (ref 0.2–1)
BUN SERPL-MCNC: 36 MG/DL (ref 5–25)
BUN SERPL-MCNC: 36 MG/DL (ref 5–25)
CALCIUM SERPL-MCNC: 8.6 MG/DL (ref 8.4–10.2)
CALCIUM SERPL-MCNC: 9 MG/DL (ref 8.4–10.2)
CHLORIDE SERPL-SCNC: 95 MMOL/L (ref 96–108)
CHLORIDE SERPL-SCNC: 96 MMOL/L (ref 96–108)
CO2 SERPL-SCNC: 26 MMOL/L (ref 21–32)
CO2 SERPL-SCNC: 27 MMOL/L (ref 21–32)
CREAT SERPL-MCNC: 1.27 MG/DL (ref 0.6–1.3)
CREAT SERPL-MCNC: 1.27 MG/DL (ref 0.6–1.3)
CRP SERPL QL: 8.3 MG/L
CRP SERPL QL: 8.5 MG/L
EOSINOPHIL # BLD AUTO: 0.22 THOUSAND/ÂΜL (ref 0–0.61)
EOSINOPHIL # BLD AUTO: 0.27 THOUSAND/ÂΜL (ref 0–0.61)
EOSINOPHIL NFR BLD AUTO: 2 % (ref 0–6)
EOSINOPHIL NFR BLD AUTO: 3 % (ref 0–6)
ERYTHROCYTE [DISTWIDTH] IN BLOOD BY AUTOMATED COUNT: 14.2 % (ref 11.6–15.1)
ERYTHROCYTE [DISTWIDTH] IN BLOOD BY AUTOMATED COUNT: 14.3 % (ref 11.6–15.1)
ERYTHROCYTE [SEDIMENTATION RATE] IN BLOOD: 91 MM/HOUR (ref 0–19)
ERYTHROCYTE [SEDIMENTATION RATE] IN BLOOD: 94 MM/HOUR (ref 0–19)
GFR SERPL CREATININE-BSD FRML MDRD: 55 ML/MIN/1.73SQ M
GFR SERPL CREATININE-BSD FRML MDRD: 55 ML/MIN/1.73SQ M
GLUCOSE SERPL-MCNC: 107 MG/DL (ref 65–140)
GLUCOSE SERPL-MCNC: 109 MG/DL (ref 65–140)
HCT VFR BLD AUTO: 38.8 % (ref 36.5–49.3)
HCT VFR BLD AUTO: 39.8 % (ref 36.5–49.3)
HGB BLD-MCNC: 13 G/DL (ref 12–17)
HGB BLD-MCNC: 13.1 G/DL (ref 12–17)
IMM GRANULOCYTES # BLD AUTO: 0.05 THOUSAND/UL (ref 0–0.2)
IMM GRANULOCYTES # BLD AUTO: 0.07 THOUSAND/UL (ref 0–0.2)
IMM GRANULOCYTES NFR BLD AUTO: 1 % (ref 0–2)
IMM GRANULOCYTES NFR BLD AUTO: 1 % (ref 0–2)
LYMPHOCYTES # BLD AUTO: 1.3 THOUSANDS/ÂΜL (ref 0.6–4.47)
LYMPHOCYTES # BLD AUTO: 1.31 THOUSANDS/ÂΜL (ref 0.6–4.47)
LYMPHOCYTES NFR BLD AUTO: 12 % (ref 14–44)
LYMPHOCYTES NFR BLD AUTO: 13 % (ref 14–44)
MCH RBC QN AUTO: 30.2 PG (ref 26.8–34.3)
MCH RBC QN AUTO: 30.7 PG (ref 26.8–34.3)
MCHC RBC AUTO-ENTMCNC: 32.9 G/DL (ref 31.4–37.4)
MCHC RBC AUTO-ENTMCNC: 33.5 G/DL (ref 31.4–37.4)
MCV RBC AUTO: 92 FL (ref 82–98)
MCV RBC AUTO: 92 FL (ref 82–98)
MONOCYTES # BLD AUTO: 0.73 THOUSAND/ÂΜL (ref 0.17–1.22)
MONOCYTES # BLD AUTO: 0.81 THOUSAND/ÂΜL (ref 0.17–1.22)
MONOCYTES NFR BLD AUTO: 7 % (ref 4–12)
MONOCYTES NFR BLD AUTO: 8 % (ref 4–12)
NEUTROPHILS # BLD AUTO: 7.73 THOUSANDS/ÂΜL (ref 1.85–7.62)
NEUTROPHILS # BLD AUTO: 8.05 THOUSANDS/ÂΜL (ref 1.85–7.62)
NEUTS SEG NFR BLD AUTO: 75 % (ref 43–75)
NEUTS SEG NFR BLD AUTO: 76 % (ref 43–75)
NRBC BLD AUTO-RTO: 0 /100 WBCS
NRBC BLD AUTO-RTO: 0 /100 WBCS
PLATELET # BLD AUTO: 380 THOUSANDS/UL (ref 149–390)
PLATELET # BLD AUTO: 391 THOUSANDS/UL (ref 149–390)
PMV BLD AUTO: 9.7 FL (ref 8.9–12.7)
PMV BLD AUTO: 9.8 FL (ref 8.9–12.7)
POTASSIUM SERPL-SCNC: 3.5 MMOL/L (ref 3.5–5.3)
POTASSIUM SERPL-SCNC: 3.8 MMOL/L (ref 3.5–5.3)
PROT SERPL-MCNC: 6.8 G/DL (ref 6.4–8.4)
PROT SERPL-MCNC: 7.4 G/DL (ref 6.4–8.4)
RBC # BLD AUTO: 4.24 MILLION/UL (ref 3.88–5.62)
RBC # BLD AUTO: 4.34 MILLION/UL (ref 3.88–5.62)
SODIUM SERPL-SCNC: 133 MMOL/L (ref 135–147)
SODIUM SERPL-SCNC: 134 MMOL/L (ref 135–147)
WBC # BLD AUTO: 10.1 THOUSAND/UL (ref 4.31–10.16)
WBC # BLD AUTO: 10.59 THOUSAND/UL (ref 4.31–10.16)

## 2024-06-17 PROCEDURE — 85652 RBC SED RATE AUTOMATED: CPT

## 2024-06-17 PROCEDURE — 86140 C-REACTIVE PROTEIN: CPT

## 2024-06-17 PROCEDURE — 86140 C-REACTIVE PROTEIN: CPT | Performed by: INTERNAL MEDICINE

## 2024-06-17 PROCEDURE — 36415 COLL VENOUS BLD VENIPUNCTURE: CPT

## 2024-06-17 PROCEDURE — 85652 RBC SED RATE AUTOMATED: CPT | Performed by: INTERNAL MEDICINE

## 2024-06-17 PROCEDURE — 80053 COMPREHEN METABOLIC PANEL: CPT | Performed by: INTERNAL MEDICINE

## 2024-06-17 PROCEDURE — 85025 COMPLETE CBC W/AUTO DIFF WBC: CPT | Performed by: INTERNAL MEDICINE

## 2024-06-17 PROCEDURE — 80053 COMPREHEN METABOLIC PANEL: CPT

## 2024-06-17 PROCEDURE — 85025 COMPLETE CBC W/AUTO DIFF WBC: CPT

## 2024-06-17 PROCEDURE — G0299 HHS/HOSPICE OF RN EA 15 MIN: HCPCS

## 2024-06-18 ENCOUNTER — TELEPHONE (OUTPATIENT)
Dept: INFECTIOUS DISEASES | Facility: CLINIC | Age: 74
End: 2024-06-18

## 2024-06-18 NOTE — TELEPHONE ENCOUNTER
Spoke to spouse of patient and advised her we rescheduled her  appointment to 06/26/24 1:15pm in person visit.

## 2024-06-21 ENCOUNTER — APPOINTMENT (OUTPATIENT)
Dept: RADIOLOGY | Facility: MEDICAL CENTER | Age: 74
End: 2024-06-21
Payer: MEDICARE

## 2024-06-21 ENCOUNTER — TELEPHONE (OUTPATIENT)
Dept: NEUROSURGERY | Facility: CLINIC | Age: 74
End: 2024-06-21

## 2024-06-21 DIAGNOSIS — M46.26 OSTEOMYELITIS OF LUMBAR SPINE (HCC): ICD-10-CM

## 2024-06-21 PROCEDURE — 72100 X-RAY EXAM L-S SPINE 2/3 VWS: CPT

## 2024-06-21 NOTE — TELEPHONE ENCOUNTER
Pt is requesting his 6/24/24 be virtual if possible after Xrays are read. Please let pt know if this is possible by calling  please

## 2024-06-24 ENCOUNTER — APPOINTMENT (OUTPATIENT)
Dept: LAB | Facility: CLINIC | Age: 74
End: 2024-06-24
Payer: MEDICARE

## 2024-06-24 ENCOUNTER — OFFICE VISIT (OUTPATIENT)
Dept: NEUROSURGERY | Facility: CLINIC | Age: 74
End: 2024-06-24
Payer: MEDICARE

## 2024-06-24 ENCOUNTER — HOME CARE VISIT (OUTPATIENT)
Dept: HOME HEALTH SERVICES | Facility: HOME HEALTHCARE | Age: 74
End: 2024-06-24
Payer: MEDICARE

## 2024-06-24 VITALS
DIASTOLIC BLOOD PRESSURE: 70 MMHG | HEIGHT: 70 IN | HEART RATE: 74 BPM | WEIGHT: 207 LBS | OXYGEN SATURATION: 96 % | SYSTOLIC BLOOD PRESSURE: 108 MMHG | TEMPERATURE: 98.1 F | BODY MASS INDEX: 29.63 KG/M2

## 2024-06-24 VITALS
OXYGEN SATURATION: 97 % | DIASTOLIC BLOOD PRESSURE: 60 MMHG | HEART RATE: 68 BPM | SYSTOLIC BLOOD PRESSURE: 112 MMHG | TEMPERATURE: 97.8 F | RESPIRATION RATE: 18 BRPM

## 2024-06-24 DIAGNOSIS — M46.26 OSTEOMYELITIS OF LUMBAR SPINE (HCC): ICD-10-CM

## 2024-06-24 DIAGNOSIS — R78.81 BACTEREMIA: ICD-10-CM

## 2024-06-24 LAB
ALBUMIN SERPL BCG-MCNC: 3.6 G/DL (ref 3.5–5)
ALP SERPL-CCNC: 47 U/L (ref 34–104)
ALT SERPL W P-5'-P-CCNC: 8 U/L (ref 7–52)
ANION GAP SERPL CALCULATED.3IONS-SCNC: 13 MMOL/L (ref 4–13)
AST SERPL W P-5'-P-CCNC: 17 U/L (ref 13–39)
BASOPHILS # BLD AUTO: 0.05 THOUSANDS/ÂΜL (ref 0–0.1)
BASOPHILS NFR BLD AUTO: 1 % (ref 0–1)
BILIRUB SERPL-MCNC: 0.34 MG/DL (ref 0.2–1)
BUN SERPL-MCNC: 22 MG/DL (ref 5–25)
CALCIUM SERPL-MCNC: 9.3 MG/DL (ref 8.4–10.2)
CHLORIDE SERPL-SCNC: 95 MMOL/L (ref 96–108)
CO2 SERPL-SCNC: 26 MMOL/L (ref 21–32)
CREAT SERPL-MCNC: 1.05 MG/DL (ref 0.6–1.3)
CRP SERPL QL: 7.8 MG/L
EOSINOPHIL # BLD AUTO: 0.2 THOUSAND/ÂΜL (ref 0–0.61)
EOSINOPHIL NFR BLD AUTO: 3 % (ref 0–6)
ERYTHROCYTE [DISTWIDTH] IN BLOOD BY AUTOMATED COUNT: 14.2 % (ref 11.6–15.1)
ERYTHROCYTE [SEDIMENTATION RATE] IN BLOOD: 75 MM/HOUR (ref 0–19)
GFR SERPL CREATININE-BSD FRML MDRD: 69 ML/MIN/1.73SQ M
GLUCOSE SERPL-MCNC: 131 MG/DL (ref 65–140)
HCT VFR BLD AUTO: 39.6 % (ref 36.5–49.3)
HGB BLD-MCNC: 12.9 G/DL (ref 12–17)
IMM GRANULOCYTES # BLD AUTO: 0.03 THOUSAND/UL (ref 0–0.2)
IMM GRANULOCYTES NFR BLD AUTO: 1 % (ref 0–2)
LYMPHOCYTES # BLD AUTO: 1.15 THOUSANDS/ÂΜL (ref 0.6–4.47)
LYMPHOCYTES NFR BLD AUTO: 18 % (ref 14–44)
MCH RBC QN AUTO: 30.1 PG (ref 26.8–34.3)
MCHC RBC AUTO-ENTMCNC: 32.6 G/DL (ref 31.4–37.4)
MCV RBC AUTO: 92 FL (ref 82–98)
MONOCYTES # BLD AUTO: 0.61 THOUSAND/ÂΜL (ref 0.17–1.22)
MONOCYTES NFR BLD AUTO: 9 % (ref 4–12)
NEUTROPHILS # BLD AUTO: 4.47 THOUSANDS/ÂΜL (ref 1.85–7.62)
NEUTS SEG NFR BLD AUTO: 68 % (ref 43–75)
NRBC BLD AUTO-RTO: 0 /100 WBCS
PLATELET # BLD AUTO: 329 THOUSANDS/UL (ref 149–390)
PMV BLD AUTO: 9.9 FL (ref 8.9–12.7)
POTASSIUM SERPL-SCNC: 3.5 MMOL/L (ref 3.5–5.3)
PROT SERPL-MCNC: 7.1 G/DL (ref 6.4–8.4)
RBC # BLD AUTO: 4.29 MILLION/UL (ref 3.88–5.62)
SODIUM SERPL-SCNC: 134 MMOL/L (ref 135–147)
WBC # BLD AUTO: 6.51 THOUSAND/UL (ref 4.31–10.16)

## 2024-06-24 PROCEDURE — 36415 COLL VENOUS BLD VENIPUNCTURE: CPT

## 2024-06-24 PROCEDURE — 80053 COMPREHEN METABOLIC PANEL: CPT

## 2024-06-24 PROCEDURE — 85025 COMPLETE CBC W/AUTO DIFF WBC: CPT

## 2024-06-24 PROCEDURE — G0300 HHS/HOSPICE OF LPN EA 15 MIN: HCPCS

## 2024-06-24 PROCEDURE — 99213 OFFICE O/P EST LOW 20 MIN: CPT | Performed by: PHYSICIAN ASSISTANT

## 2024-06-24 PROCEDURE — 85652 RBC SED RATE AUTOMATED: CPT

## 2024-06-24 PROCEDURE — 86140 C-REACTIVE PROTEIN: CPT

## 2024-06-24 RX ORDER — CEFTRIAXONE SODIUM 10 G/100ML
INJECTION, POWDER, FOR SOLUTION INTRAVENOUS
COMMUNITY
Start: 2024-06-10

## 2024-06-24 NOTE — PROGRESS NOTES
Neurosurgery Office Note  Capo Pepper 74 y.o. male MRN: [id number]      Assessment & Plan     Patient is a 74 years old pleasant gentleman here today for 2 to 3 weeks follow-up of L1-L2 osteomyelitis/discitis and also L2-L3 epidural phlegmonous and L1 3 epidural abscess.  Patient is taking antibiotics and following up with ID.  Report is feeling better with his back pain in the right leg radiculopathy, he has intermittent pain associated with movement and certain activities.  He is wearing brace.  Had follow-up upright thoracolumbar spine x-rays which demonstrate stable alignment of lumbar vertebral bones, with collapse at L1-L2 disc space.  Patient denies any numbness, weakness or paresthesias extremities.  No bowel or bladder issues.  He uses cane for safety.  No fever, chills, rigors, cough or chest pain.    Exam-patient alert and oriented x 3.  Moves all extremities.  Strength is 5/5 bilaterally.  Sensation to light intact throughout. DTR 2+ no clonus bilaterally.  Slight discomfort and tenderness in the lower lumbar spine.  Slightly unstable gait.  Brace on.    His ESR and CRP improving    Hx, exam and images reviewed with the patient.  Management plan discussed.  Patient is feeling better clinically, x-ray stable without collapse of vertebral bones, nursing and needs any further x-rays of his lumbar spine.  Will see him back in 3 weeks with MRI of lumbar spine.  Advised to continue antibiotics and follow-up with ID.  For precaution, avoid lifting heavy objects, excessive bending or twisting.  All questions and concerns were answered to patient's satisfaction.  Patient verbalized understanding's and agreed with the plan.    Plan:  Patient is back pain and sciatica improving  Upright lumbar spine x-rays demonstrate stable L1 -L2 vertebral bones and without collapse  Advised to continue wearing brace as instructed  Continue antibiotics and follow-up with ID  MRI of lumbar spine pending will see him back  once he had the image in 3 to 4 weeks  Fall precaution, avoid lifting heavy objects, excessive bending or twisting  Call with question or concern      CHIEF COMPLAINT    Chief Complaint   Patient presents with    Follow-up     2 Wk Hsp F/U-- L./S Xray on6/21/24             History of Present Illness     HPI    See detailed discussion above     REVIEW OF SYSTEMS  Review of system personally reviewed and updated as follows  Review of Systems   Constitutional: Negative.    HENT: Negative.     Eyes: Negative.    Respiratory: Negative.     Cardiovascular: Negative.    Gastrointestinal: Negative.    Endocrine: Negative.    Genitourinary: Negative.    Musculoskeletal:  Positive for back pain and myalgias.         Pain 8/10 today    2 Wk Hsp F/U-- L./S Xray on6/21/24  L/S  MRI scheduled on 7/7/24   ED on 6/1 for discitis/osteomyelitis at L2-L3, 7.0 cm ventral epidural abscess     Skin: Negative.    Allergic/Immunologic: Negative.    Neurological: Negative.    Hematological: Negative.    Psychiatric/Behavioral: Negative.     All other systems reviewed and are negative.      ROS obtained by MA. Reviewed. See HPI.     Meds/Allergies     Current Outpatient Medications   Medication Sig Dispense Refill    Alcohol Swabs 70 % PADS May substitute brand based on insurance coverage. Check glucose TID. 100 each 0    aspirin 81 mg chewable tablet Chew 81 mg daily      Blood Glucose Monitoring Suppl (OneTouch Verio Reflect) w/Device KIT May substitute brand based on insurance coverage. Check glucose TID. 1 kit 0    Ceftriaxone Sodium 2 g SOLR Inject 2 g into a catheter in a vein every 24 hours. 20 ml solution over 5 minutes IV push  Indications: .      glucose blood (OneTouch Verio) test strip May substitute brand based on insurance coverage. Check glucose TID. 100 each 0    insulin degludec (Tresiba FlexTouch) 100 units/mL injection pen Inject 32 Units under the skin daily at bedtime 15 mL 0    insulin lispro (HumaLOG KwikPen) 100  units/mL injection pen Inject 10 Units under the skin 3 (three) times a day with meals 15 mL 0    Insulin Pen Needle (BD Pen Needle Catherine 2nd Gen) 32G X 4 MM MISC For use with insulin pen. Pharmacy may dispense brand covered by insurance. 100 each 0    Insulin Pen Needle (BD Pen Needle Catherine 2nd Gen) 32G X 4 MM MISC For use with insulin pen. Pharmacy may dispense brand covered by insurance. 100 each 0    metoprolol succinate (TOPROL-XL) 50 mg 24 hr tablet Take 50 mg by mouth daily. Indications: .      metroNIDAZOLE (FLAGYL) 500 mg tablet Take 1 tablet (500 mg total) by mouth every 8 (eight) hours 105 tablet 0    OneTouch Delica Lancets 33G MISC May substitute brand based on insurance coverage. Check glucose TID. 100 each 0    potassium chloride (Klor-Con M20) 20 mEq tablet Take 20 mEq by mouth daily. Indications: .      ramipril (ALTACE) 1.25 mg capsule Take 1.25 mg by mouth daily.      Sodium Chloride Flush (Normal Saline Flush) 0.9 % SOLN Inject 10-20 mL into a catheter in a vein in the morning. Before and after Abx and as needed for flushing  Indications: .      tiZANidine (ZANAFLEX) 2 mg tablet Take 1 tablet (2 mg total) by mouth 2 (two) times a day as needed for muscle spasms for up to 5 days 10 tablet 0    torsemide (DEMADEX) 20 mg tablet Take 20 mg by mouth daily. Indications: .       No current facility-administered medications for this visit.       Allergies   Allergen Reactions    Poultry Meal - Food Allergy        PAST HISTORY    Past Medical History:   Diagnosis Date    Atypical chest pain     Back pain     CHF (congestive heart failure) ([facility])     Constipation 06/01/2024    Legionnaire's disease ([facility])     Leukocytosis 06/02/2024    Lung disorder     Migraine        Past Surgical History:   Procedure Laterality Date    ROTATOR CUFF REPAIR Bilateral     TONSILLECTOMY      TRIGGER FINGER RELEASE         Social History     Tobacco Use    Smoking status: Former     Current packs/day: 0.00     Average packs/day:  1 pack/day for 25.0 years (25.0 ttl pk-yrs)     Types: Cigarettes, Cigars     Start date:      Quit date:      Years since quittin.4    Smokeless tobacco: Never   Vaping Use    Vaping status: Never Used   Substance Use Topics    Alcohol use: Yes    Drug use: Not Currently     Comment: CBD Oil pt stated no longer using       No family history on file.      Above history personally reviewed.       EXAM    Vitals:There were no vitals taken for this visit.,There is no height or weight on file to calculate BMI.     Physical Exam  Constitutional:       Appearance: Normal appearance.   HENT:      Head: Normocephalic and atraumatic.   Pulmonary:      Effort: Pulmonary effort is normal.   Musculoskeletal:         General: Tenderness present.      Cervical back: Normal range of motion.   Neurological:      General: No focal deficit present.      Mental Status: He is alert and oriented to person, place, and time.      Deep Tendon Reflexes:      Reflex Scores:       Tricep reflexes are 2+ on the right side and 2+ on the left side.       Bicep reflexes are 2+ on the right side and 2+ on the left side.       Brachioradialis reflexes are 2+ on the right side and 2+ on the left side.       Patellar reflexes are 2+ on the right side and 2+ on the left side.       Achilles reflexes are 2+ on the right side and 2+ on the left side.  Psychiatric:         Speech: Speech normal.         Neurologic Exam     Mental Status   Oriented to person, place, and time.   Speech: speech is normal   Level of consciousness: alert    Cranial Nerves     CN III, IV, VI   Nystagmus: none     CN XI   CN XI normal.     Motor Exam   Muscle bulk: normal  Overall muscle tone: normal  Right arm tone: normal  Left arm tone: normal  Right arm pronator drift: absent  Left arm pronator drift: absent  Right leg tone: normal  Left leg tone: normal    Sensory Exam   Light touch normal.     Gait, Coordination, and Reflexes     Reflexes   Right  brachioradialis: 2+  Left brachioradialis: 2+  Right biceps: 2+  Left biceps: 2+  Right triceps: 2+  Left triceps: 2+  Right patellar: 2+  Left patellar: 2+  Right achilles: 2+  Left achilles: 2+  Right : 2+  Left : 2+  Right Mayen: absent  Left Mayen: absent  Right ankle clonus: absent  Left ankle clonus: absent        MEDICAL DECISION MAKING    Imaging Studies:     XR spine lumbar 2 or 3 views injury    Result Date: 6/21/2024  Narrative: LUMBAR SPINE INDICATION:   Osteomyelitis of vertebra, lumbar region. COMPARISON:  None. VIEWS:  XR SPINE LUMBAR 2 OR 3 VIEWS INJURY Images: 2 FINDINGS: There are 5 non rib bearing lumbar vertebral bodies. There is no evidence of acute fracture or destructive osseous lesion. Alignment is unremarkable. Moderate facet disease in the lumbar spine. There is mild multilevel osteophytosis across throughout the lumbar spine as well. Otherwise no significant discogenic degenerative disease seen. The pedicles appear intact. Soft tissues are unremarkable.     Impression: Mild spondylosis with osteophyte formation throughout the lumbar spine. Moderate facet disease lower lumbar spine. No acute abnormality. Electronically signed: 06/21/2024 10:11 PM Austin Franco MD    Echo complete w/ contrast if indicated    Result Date: 6/5/2024  Narrative:   Left Ventricle: Left ventricular cavity size is normal. Wall thickness is normal. The left ventricular ejection fraction is 55%. Systolic function is normal. Wall motion cannot be accurately assessed. Significantly technically difficult study, unable to assess for valvular vegetation. If clinical suspicion is high, recommend transesophageal echocardiogram.     MRI lumbar spine w wo contrast    Result Date: 6/5/2024  Narrative: MRI LUMBAR SPINE WITH AND WITHOUT CONTRAST INDICATION: -. lower extremity weakness COMPARISON: Same day MRI thoracic spine with and without contrast. CT chest abdomen pelvis plus CT recon thoracolumbar 6/1/2024.  TECHNIQUE:  Multiplanar, multisequence imaging of the lumbar spine was performed before and after gadolinium administration.     . IV Contrast:  9 mL of Gadobutrol injection (SINGLE-DOSE) IMAGE QUALITY:  Diagnostic FINDINGS: VERTEBRAL BODIES:  There are 5 lumbar type vertebral bodies. Minor grade 1 retrolisthesis L5-S1. No scoliosis.  No compression fracture. L2-L3 vertebral bodies with abnormal bone marrow signal (T1 hypointensity, T2 mixed intensity, STIR hyperintensity, and enhancement) and associated discal fluid, suspicious for discitis/osteomyelitis. 1.9 x 0.9 x 7.0 cm ventral epidural rim-enhancing fluid collection spanning from mid L1 to mid L3 levels, suspicious for epidural abscess. Heterogeneous bone marrow signal. Mixed type I/II Modic endplate change L5-S1. SACRUM:  Normal signal within the sacrum. No evidence of insufficiency or stress fracture. DISTAL CORD AND CONUS: Please see above discussion regarding ventral epidural abscess. Normal size and signal within the distal cord and conus. PARASPINAL SOFT TISSUES: Mild intramuscular edema and enhancement in left psoas major and left erector spinae musculature. LOWER THORACIC DISC SPACES:  Mild noncompressive lower thoracic degenerative change. LUMBAR DISC SPACES: Multilevel osteophytes, disc height loss, and facet arthropathy. L1-L2: Mild diffuse disc bulge. Small ventral epidural fluid collection with inferior extension. Mild canal stenosis. No significant foraminal narrowing. L2-L3: Diffuse disc bulge. Small ventral epidural fluid collection with superior and inferior extension thickness and inferior L2 level. Facet arthropathy. Moderate canal stenosis. Some enhancing epidural phlegmonous change extends into left L2-L3 resulting in mild-to-moderate left foraminal narrowing. L3-L4: Diffuse disc bulge. Small ventral epidural fluid collection extends to mid L3 level. Facet arthropathy. Mild canal stenosis. Mild left foraminal narrowing. L4-L5: Diffuse disc  bulge. Facet arthropathy. Mild canal stenosis. Mild left foraminal narrowing. L5-S1: Diffuse disc bulge. Facet arthropathy. Mild canal stenosis. Moderate right and mild left foraminal narrowing. POSTCONTRAST IMAGING: Please see above discussion. OTHER FINDINGS: Partially imaged bilateral renal cysts.     Impression: Findings suspicious for acute discitis/osteomyelitis at L2-L3, 7.0 cm ventral epidural abscess spanning from mid L1 to mid L3 levels, enhancing epidural phlegmonous change extending into left L2-L3 foramen, associated moderate canal stenosis at L2-L3 levels and mild-to-moderate left L2-L3 foraminal narrowing. Recommend evaluation by neurosurgery. Mild intramuscular edema and enhancement in left psoas major and left erector spinae musculature, suspicious for infectious/inflammatory myositis Multilevel degenerative changes of lumbar spine with varying degrees of canal stenosis (moderate L2-L3) and foraminal narrowing (moderate right L5-S1), as detailed above. I personally discussed this study with RITA HEATON on 6/5/2024 9:02 AM. Workstation performed: [id number]     MRI thoracic spine w wo contrast    Result Date: 6/5/2024  Narrative: MRI THORACIC SPINE WITH AND WITHOUT CONTRAST INDICATION: -.     lower extremity weakness COMPARISON:  Same day MRI lumbar spine with and without contrast. CT chest abdomen pelvis with contrast plus CT recon only thoracolumbar no charge 6/1/2024 TECHNIQUE:  Multiplanar, multisequence imaging of the thoracic spine was performed before and after gadolinium administration. . IV Contrast:  9 mL of Gadobutrol injection (SINGLE-DOSE) IMAGE QUALITY:  Diagnostic. FINDINGS: ALIGNMENT:  Normal alignment of the thoracic spine.  No compression fracture.  No subluxation.  No evidence of scoliosis. MARROW SIGNAL: Mixed type I/II Modic endplate change at T8-T9. Small T11 and L1 intraosseous vertebral body hemangiomas. Otherwise, normal bone marrow signal. THORACIC CORD:  Normal signal within  the thoracic cord. PREVERTEBRAL AND PARASPINAL SOFT TISSUES:   Normal. THORACIC DEGENERATIVE CHANGE:  Multilevel osteophytes, disc height loss, and facet arthropathy. Small posterior disc bulges at T6-T7, T7-T8, and T8-T9 contributing to moderate canal stenosis at these levels. Otherwise, no significant canal stenosis or foraminal narrowing. POSTCONTRAST:  No abnormal enhancement. OTHER FINDINGS: Partially imaged trace bilateral pleural effusions with subsegmental atelectasis, small hiatal hernia, and small left renal cyst.     Impression: No acute abnormality of thoracic spine. Mild multilevel degenerative changes of thoracic spine with multilevel minor canal stenosis, as detailed above. Partially imaged trace bilateral pleural effusions with subsegmental atelectasis, similar to prior exam. Same day MRI lumbar spine with and without contrast. Workstation performed: [id number]     CT facial bones w contrast    Result Date: 6/3/2024  Narrative: CT FACIAL SOFT TISSUES WITH INTRAVENOUS CONTRAST INDICATION:   Bacteriodes Fragilis Bactermia with History of dental infection to rule out abscess. COMPARISON: None. TECHNIQUE:  Axial images through the facial soft tissues were performed.  Multiplanar 2D reformatted images were created from the source data. Radiation dose length product (DLP) for this visit:  424 mGy-cm .  This examination, like all CT scans performed in the [facility], was performed utilizing techniques to minimize radiation dose exposure, including the use of iterative reconstruction and automated exposure control. IV Contrast:  85 mL of iohexol (OMNIPAQUE) IMAGE QUALITY:  Diagnostic. FINDINGS: SOFT TISSUES: No discrete soft tissue mass.  No signs of soft tissue infection.  No abscess formation.  No hematoma. Dentition: Multiple dental fillings are noted. Focal periapical lucency involving a left maxillary molar tooth. FACIAL BONES: There is no facial bone fracture identified.  No discrete  lytic or blastic lesion.  No destructive lesions. ORBITS: Normal globes.  Preseptal and retrobulbar soft tissues are unremarkable. SINUSES: The paranasal sinuses are clear.     Impression: There is no suspicious drainable fluid/abscess collection. Poor dentition with multiple dental fillings and focal periapical lucency involving a left maxillary molar tooth. Workstation performed: [id number]     XR chest 1 view portable    Result Date: 6/1/2024  Narrative: XR CHEST PORTABLE INDICATION: febrile, r/o infectious etiology. COMPARISON: Chest CT 6/1/2024, CXR 1/14/2022. FINDINGS: Low lung volumes with mild bibasilar atelectasis. Nothing to indicate pneumonia. No pneumothorax or pleural effusion. Normal cardiomediastinal silhouette. Bones are unremarkable for age. Normal upper abdomen.     Impression: No acute cardiopulmonary disease. Workstation performed: [id number]     CT recon only thoracolumbar (No Charge)    Result Date: 6/1/2024  Narrative: CT THORACIC AND LUMBAR SPINE INDICATION:   r/o lytic lesion / CA. COMPARISON: TECHNIQUE: Axial CT examination of the thoracic and lumbar spine was obtained utilizing reconstructed images from CT of the chest abdomen and pelvis performed the same day. Images were reformatted in the sagittal and coronal planes. This examination, like all [facility], was performed utilizing techniques to minimize radiation dose exposure, including the use of iterative reconstruction and automated exposure control. FINDINGS: ALIGNMENT: Normal alignment. No spondylolisthesis.  No scoliosis. VERTEBRAE:  No fracture.  No acute osseous abnormality. DEGENERATIVE CHANGES: Mild multilevel degenerative disc disease with anterior marginal osteophytes. Greatest at the L5-S1 level and through the lower thoracic spine. No critical stenoses. PREVERTEBRAL AND PARASPINAL SOFT TISSUES: Normal. OTHER: Unremarkable     Impression: No fracture or traumatic subluxation.  Workstation performed: [id number]     CT chest abdomen pelvis w contrast    Result Date: 6/1/2024  Narrative: CT CHEST, ABDOMEN AND PELVIS WITH IV CONTRAST INDICATION: some global abd tenderness of unclear etiology. Also w/ CVA tenderness and clinical concern for pyelo. Suspect infectious etiology. COMPARISON: 1/14/2022, 8/18/2016 TECHNIQUE: CT examination of the chest, abdomen and pelvis was performed. Multiplanar 2D reformatted images were created from the source data. This examination, like all CT scans performed in the [facility], was performed utilizing techniques to minimize radiation dose exposure, including the use of iterative reconstruction and automated exposure control. Radiation dose length product (DLP) for this visit: 762 mGy-cm IV Contrast: 80 mL of iohexol (OMNIPAQUE) Enteric Contrast: Not administered. FINDINGS: CHEST LUNGS: Multiple images limited by respiratory motion. There is mild dependent opacities in both lower lobes which have an appearance favoring atelectasis and/or scarring. Minimal component of peripheral bronchiectasis in these areas, possibly residual from infiltrate seen in 2022. PLEURA: Unremarkable. HEART/GREAT VESSELS: Heart is unremarkable for patient's age. No thoracic aortic aneurysm. MEDIASTINUM AND NIR: Small hiatal hernia. Stable compared with prior. No adenopathy. CHEST WALL AND LOWER NECK: Unremarkable. ABDOMEN LIVER/BILIARY TREE: Unremarkable. GALLBLADDER: No calcified gallstones. No pericholecystic inflammatory change. SPLEEN: Unremarkable. PANCREAS: Unremarkable. ADRENAL GLANDS: Unremarkable. KIDNEYS/URETERS: Several small rounded hypodensities favoring cysts. No CT findings to suggest acute pyelonephritis. No hydronephrosis. No nephroureterolithiasis. STOMACH AND BOWEL: Colonic diverticulosis without findings of acute diverticulitis. APPENDIX: Normal. Incidental note made of an appendicolith. ABDOMINOPELVIC CAVITY: No ascites. No pneumoperitoneum. No  lymphadenopathy. VESSELS: Unremarkable for patient's age. PELVIS REPRODUCTIVE ORGANS: Unremarkable for patient's age. URINARY BLADDER: Unremarkable. ABDOMINAL WALL/INGUINAL REGIONS: Unremarkable. BONES: No acute fracture or suspicious osseous lesion.     Impression: Dependent opacities in both lungs favoring combination of atelectasis and mild residual scarring and bronchiectasis. Diverticulosis without evidence for diverticulitis. No findings to suggest pyelonephritis or other acute renal abnormality. Workstation performed: [id number]

## 2024-06-26 ENCOUNTER — TELEPHONE (OUTPATIENT)
Dept: INFECTIOUS DISEASES | Facility: CLINIC | Age: 74
End: 2024-06-26

## 2024-06-26 ENCOUNTER — TELEMEDICINE (OUTPATIENT)
Dept: INFECTIOUS DISEASES | Facility: CLINIC | Age: 74
End: 2024-06-26
Payer: MEDICARE

## 2024-06-26 DIAGNOSIS — R78.81 BACTEREMIA: Primary | ICD-10-CM

## 2024-06-26 DIAGNOSIS — Z79.4 TYPE 2 DIABETES MELLITUS WITH OTHER SPECIFIED COMPLICATION, WITH LONG-TERM CURRENT USE OF INSULIN (HCC): ICD-10-CM

## 2024-06-26 DIAGNOSIS — E11.69 TYPE 2 DIABETES MELLITUS WITH OTHER SPECIFIED COMPLICATION, WITH LONG-TERM CURRENT USE OF INSULIN (HCC): ICD-10-CM

## 2024-06-26 DIAGNOSIS — M46.26 OSTEOMYELITIS OF LUMBAR SPINE (HCC): ICD-10-CM

## 2024-06-26 PROCEDURE — 99214 OFFICE O/P EST MOD 30 MIN: CPT | Performed by: PHYSICIAN ASSISTANT

## 2024-06-26 NOTE — TELEPHONE ENCOUNTER
Marium called asking to change Matt appointment today as he is in too much pain and would either need to reschedule or change to virtual. He is now switched to a virtual visit (via [employer]) with Peter Arthur today at 1:15 PM. Thank you

## 2024-06-26 NOTE — PROGRESS NOTES
Ambulatory Visit  Name: Capo Pepper      : 1950      MRN: [id number]  Encounter Provider: Peter Arthur PA-C  Encounter Date: 2024   Encounter department: [facility]    Assessment & Plan   1. Bacteremia  Assessment & Plan:  bacteroides bacteremia complicated by osteomyelitis of L2-3 with epidural abscess and myositis.  Source thought to be dental vs GI.  Patient with multiple dental issues seen on CT face.  No h/o colonoscopy.  MRI with OM at L2-3 with epidural abscess and myositis.  No plans for intervention per neurosurgery.  IR biopsy deferred given concern for poor yield that may not outweigh risk of procedure on discussion with IR attending.   Patient eventually discharged to complete a course of IV ceftriaxone and po flagyl.    Patient doing well.  Only complaint is fatigue.  Some improvement in back pain.  Labs stable. Inflammatory markers slowly declining.      Plan  - continue ceftriaxone 2 grams IV every 24 hours and flagyl 500 mg po every 8 hours for at least 6 weeks through 7/15/24.  May need to extend IV vs po antibiotic pending MRI results and ESR/CRP  - continue weekly labs including CBCD, CMP, ESR , CRP  - MRI as scheduled 24  - follow up with Neurosurgery as scheduled  - patient has appt for dental extraction 24  - he has yet to schedule colonoscopy and states he will do this once he can lay down flat.   - RTO one week after MRI  2. Osteomyelitis of lumbar spine (HCC)  Assessment & Plan:  As outlined  above  3. Type 2 diabetes mellitus with other specified complication, with long-term current use of insulin (HCC)  Assessment & Plan:    Lab Results   Component Value Date    HGBA1C 9.4 (H) 2024         History of Present Illness     Capo Pepper is a 74 y.o. male who presents with bacteroides bacteremia complicated by osteomyelitis of L2-3 with epidural abscess and myositis.  Source thought to be dental vs GI.  Patient with multiple  dental issues seen on CT face.  No h/o colonoscopy.  MRI with OM at L2-3 with epidural abscess and myositis.  No plans for intervention per neurosurgery.  IR biopsy deferred given concern for poor yield that may not outweigh risk of procedure on discussion with IR attending.   Patient eventually discharged to complete a course of IV ceftriaxone and po flagyl.  He is overall doing well.  His only complaint is fatigue.  He continues to have back pain but is has improved since the time of admission.  PICC line working well.     Review of Systems   Constitutional:  Positive for fatigue. Negative for chills and fever.   Respiratory:  Negative for cough and shortness of breath.    Gastrointestinal:  Negative for diarrhea, nausea and vomiting.   Musculoskeletal:  Positive for back pain.   Skin:  Negative for rash.   Psychiatric/Behavioral:  Negative for behavioral problems and confusion.        Objective     There were no vitals taken for this visit.    Physical Exam  Vitals reviewed.   Constitutional:       General: He is not in acute distress.     Appearance: Normal appearance. He is not ill-appearing, toxic-appearing or diaphoretic.   HENT:      Head: Normocephalic and atraumatic.   Eyes:      General: No scleral icterus.        Right eye: No discharge.         Left eye: No discharge.      Conjunctiva/sclera: Conjunctivae normal.   Pulmonary:      Effort: Pulmonary effort is normal. No respiratory distress.   Abdominal:      General: Bowel sounds are normal. There is no distension.      Palpations: Abdomen is soft.      Tenderness: There is no abdominal tenderness.   Skin:     Coloration: Skin is not jaundiced or pale.      Findings: No erythema or rash.      Comments: PICC insertion site without erythema or drainage   Neurological:      Mental Status: He is alert and oriented to person, place, and time.   Psychiatric:         Mood and Affect: Mood normal.         Behavior: Behavior normal.       Administrative  Statements         Labs:   6/24/24  Wbc: 6.51  Hgb: 12.9  Plt: 329  Cr: 1.05  LFTs: WNL  ESR: 75  CRP: 7.8    Telemedicine consent    Patient: Capo Pepper  Provider: Peter Arthur PA-C  Provider located at [facility]  [address] [place] [id number] [phone]    The patient was identified by name and date of birth. Capo Pepper was informed that this is a telemedicine visit and that the visit is being conducted through the Epic Embedded platform. He agrees to proceed..  My office door was closed. No one else was in the room.  He acknowledged consent and understanding of privacy and security of the video platform. The patient has agreed to participate and understands they can discontinue the visit at any time.    Patient is aware this is a billable service.     I spent 15 minutes with the patient during this visit.  Additional time spent on chart/lab review, documentation and order placement.

## 2024-06-26 NOTE — TELEPHONE ENCOUNTER
Spoke to patient wife she stated she is going to help her  connect through [employer] and they did test the camera to make sure it was working. I advise her to sign on 15 mins before appointment time to make sure they are connected.. Pt wife verbalized she understands.

## 2024-06-26 NOTE — PATIENT INSTRUCTIONS
Plan  - continue ceftriaxone 2 grams IV every 24 hours and flagyl 500 mg po every 8 hours for at least 6 weeks through 7/15/24.  May need to extend IV vs po antibiotic pending MRI results and ESR/CRP  - continue weekly labs including CBCD, CMP, ESR , CRP  - MRI as scheduled 7/7/24  -- RTO one week after MRI

## 2024-06-26 NOTE — ASSESSMENT & PLAN NOTE
bacteroides bacteremia complicated by osteomyelitis of L2-3 with epidural abscess and myositis.  Source thought to be dental vs GI.  Patient with multiple dental issues seen on CT face.  No h/o colonoscopy.  MRI with OM at L2-3 with epidural abscess and myositis.  No plans for intervention per neurosurgery.  IR biopsy deferred given concern for poor yield that may not outweigh risk of procedure on discussion with IR attending.   Patient eventually discharged to complete a course of IV ceftriaxone and po flagyl.    Patient doing well.  Only complaint is fatigue.  Some improvement in back pain.  Labs stable. Inflammatory markers slowly declining.      Plan  - continue ceftriaxone 2 grams IV every 24 hours and flagyl 500 mg po every 8 hours for at least 6 weeks through 7/15/24.  May need to extend IV vs po antibiotic pending MRI results and ESR/CRP  - continue weekly labs including CBCD, CMP, ESR , CRP  - MRI as scheduled 7/7/24  - follow up with Neurosurgery as scheduled  - patient has appt for dental extraction 8/5/24  - he has yet to schedule colonoscopy and states he will do this once he can lay down flat.   - RTO one week after MRI

## 2024-07-01 ENCOUNTER — TELEPHONE (OUTPATIENT)
Dept: INFECTIOUS DISEASES | Facility: CLINIC | Age: 74
End: 2024-07-01

## 2024-07-01 ENCOUNTER — TELEPHONE (OUTPATIENT)
Age: 74
End: 2024-07-01

## 2024-07-01 ENCOUNTER — HOME CARE VISIT (OUTPATIENT)
Dept: HOME HEALTH SERVICES | Facility: HOME HEALTHCARE | Age: 74
End: 2024-07-01
Payer: MEDICARE

## 2024-07-01 VITALS
DIASTOLIC BLOOD PRESSURE: 62 MMHG | HEART RATE: 84 BPM | RESPIRATION RATE: 16 BRPM | TEMPERATURE: 98.3 F | SYSTOLIC BLOOD PRESSURE: 100 MMHG | OXYGEN SATURATION: 92 %

## 2024-07-01 DIAGNOSIS — T82.9XXA COMPLICATION ASSOCIATED WITH PERIPHERALLY INSERTED CENTRAL CATHETER (PICC), INITIAL ENCOUNTER: Primary | ICD-10-CM

## 2024-07-01 DIAGNOSIS — M46.26 OSTEOMYELITIS OF LUMBAR SPINE (HCC): Primary | ICD-10-CM

## 2024-07-01 PROCEDURE — G0299 HHS/HOSPICE OF RN EA 15 MIN: HCPCS

## 2024-07-01 NOTE — TELEPHONE ENCOUNTER
Spoke with Shellie to schedule patient for picc replacement.   They are going to send messages to all campuses as radiologist wants patient scheduled tomorrow.  They will reach out to patient directly.

## 2024-07-01 NOTE — TELEPHONE ENCOUNTER
Contacted patient wife.  At this time, Dr. Cuevas wants to hold anything further going into the line. Marium confirms.   Patient and wife aware that we have placed IR referral and IR will reach out to schedule. HOWEVER, if we cannot have patient seen there by tomorrow patient will be recommended to go to ED.  I let her know I will touch base with them later.

## 2024-07-01 NOTE — TELEPHONE ENCOUNTER
[place] calling stating the patient has a crack in PICC line, warm transferred to office for further assistance.

## 2024-07-01 NOTE — TELEPHONE ENCOUNTER
Received call from Radha from .  Patient nurse reporting PICC line is cracked.  Requesting guidance on how we wish for patient to proceed.    Was transferred on the line with patient's nurse, [place].    She confirms that when she went to flush the PICC line, saline is flying out. She states that there is a visible crack in the tubing. Wife is reporting that there has been leakage from the PICC for a few days (since Friday or Saturday). She reports a very small amount of lost medication, however.     Looking to find out if Dr. Cuevas wants patient to go to ER, or to place IR referral. Patient would prefer [facility] if IR able to see there.    Per Dr. Cuevas, we can try stat IR referral and they can see him today or tomorrow then we can avoid the ER. I would tell him to hold off on infusing anything further through the PICC line until it has been seen and replaced by IR. If IR cannot get him in that he does have to come to the ER.    IR Referral placed.    Contacted IR scheduling, spoke to Shellie. They cannot schedule patient until their radiologist reviews. I will follow up with this in a bit.

## 2024-07-02 ENCOUNTER — HOSPITAL ENCOUNTER (OUTPATIENT)
Dept: RADIOLOGY | Facility: HOSPITAL | Age: 74
Discharge: HOME | End: 2024-07-02
Attending: RADIOLOGY
Payer: MEDICARE

## 2024-07-02 DIAGNOSIS — M46.26 OSTEOMYELITIS OF LUMBAR SPINE (HCC): ICD-10-CM

## 2024-07-02 PROCEDURE — NC001 PR NO CHARGE: Performed by: RADIOLOGY

## 2024-07-02 PROCEDURE — 36584 COMPL RPLCMT PICC RS&I: CPT

## 2024-07-02 PROCEDURE — 77001 FLUOROGUIDE FOR VEIN DEVICE: CPT

## 2024-07-02 PROCEDURE — C1769 GUIDE WIRE: HCPCS

## 2024-07-02 PROCEDURE — C1751 CATH, INF, PER/CENT/MIDLINE: HCPCS

## 2024-07-02 NOTE — PROCEDURES
Venous Access Line Insertion    Date/Time: 7/2/2024 8:35 AM    Performed by: Smooth Montes  Authorized by: Haydee Martinez MD    Patient location:  IR  Consent:     Consent obtained:  Written (continued)    Consent given by:  Patient    Alternatives discussed:  No treatment and delayed treatment  Universal protocol:     Procedure explained and questions answered to patient or proxy's satisfaction: yes      Immediately prior to procedure, a time out was called: yes      Relevant documents present and verified: yes      Test results available and properly labeled: yes      Radiology Images displayed and confirmed.  If images not available, report reviewed: yes      Required blood products, implants, devices, and special equipment available: yes      Site/side marked: yes      Patient identity confirmed:  Verbally with patient  Pre-procedure details:     Hand hygiene: Hand hygiene performed prior to insertion      Sterile barrier technique: All elements of maximal sterile technique followed      Skin preparation:  ChloraPrep    Skin preparation agent: Skin preparation agent completely dried prior to procedure    Procedure details:     Complex Venous Access Line Type: PICC      Complex Venous Access Line Indications: long term antibiotics      Orientation:  Right    Location:  Basilic    Procedural supplies:  Single lumen    Catheter size:  4 Fr    Total catheter length (cm):  44    Catheter out on skin (cm):  0    Max flow rate:  999    Arm circumference:  31    Patient evaluated for contraindications to access (i.e. fistula, thrombosis, etc): Yes      Approach: percutaneous technique used      Ultrasound image availability:  Images available in PACS    Sterile ultrasound techniques: Sterile gel and sterile probe covers were used      Number of attempts:  1    Successful placement: yes      Landmarks identified: yes      Cath access vessel: fluoro.  Anesthesia (see MAR for exact dosages):     Anesthesia method:   None  Post-procedure details:     Post-procedure:  Dressing applied and securement device placed    Assessment:  Blood return through all ports    Post-procedure complications: none      Patient tolerance of procedure:  Tolerated well, no immediate complications

## 2024-07-03 ENCOUNTER — TELEPHONE (OUTPATIENT)
Dept: INFECTIOUS DISEASES | Facility: CLINIC | Age: 74
End: 2024-07-03

## 2024-07-03 NOTE — TELEPHONE ENCOUNTER
Contacted patient to discuss, ensure he is doing well with new PICC line.    LM for patient to CB.    Patient was extended two days antibiotic d/t missed doses (through 7/17). Pharmacy was made aware. Patient to have MRI 7/7 as ordered, follow up 7/11.

## 2024-07-05 ENCOUNTER — TELEPHONE (OUTPATIENT)
Dept: INFECTIOUS DISEASES | Facility: CLINIC | Age: 74
End: 2024-07-05

## 2024-07-05 NOTE — TELEPHONE ENCOUNTER
Contacted PATRICIA as patient is past due for weekly labs. Looks like patient is scheduled for a visit Monday. Spoke with Syl. She is going to see what plan is, and contact me back.

## 2024-07-07 ENCOUNTER — HOSPITAL ENCOUNTER (OUTPATIENT)
Dept: MRI IMAGING | Facility: HOSPITAL | Age: 74
Discharge: HOME | End: 2024-07-07
Attending: INTERNAL MEDICINE

## 2024-07-07 DIAGNOSIS — M46.26 OSTEOMYELITIS OF LUMBAR SPINE (HCC): ICD-10-CM

## 2024-07-08 ENCOUNTER — TELEPHONE (OUTPATIENT)
Dept: INFECTIOUS DISEASES | Facility: CLINIC | Age: 74
End: 2024-07-08

## 2024-07-08 ENCOUNTER — APPOINTMENT (OUTPATIENT)
Dept: LAB | Facility: MEDICAL CENTER | Age: 74
End: 2024-07-08
Payer: MEDICARE

## 2024-07-08 ENCOUNTER — HOME CARE VISIT (OUTPATIENT)
Dept: HOME HEALTH SERVICES | Facility: HOME HEALTHCARE | Age: 74
End: 2024-07-08
Payer: MEDICARE

## 2024-07-08 VITALS
TEMPERATURE: 98.3 F | HEART RATE: 84 BPM | DIASTOLIC BLOOD PRESSURE: 78 MMHG | OXYGEN SATURATION: 95 % | SYSTOLIC BLOOD PRESSURE: 130 MMHG | RESPIRATION RATE: 20 BRPM

## 2024-07-08 DIAGNOSIS — R78.81 BACTEREMIA: ICD-10-CM

## 2024-07-08 DIAGNOSIS — M46.26 OSTEOMYELITIS OF LUMBAR SPINE (HCC): ICD-10-CM

## 2024-07-08 DIAGNOSIS — M46.26 OSTEOMYELITIS OF LUMBAR SPINE (HCC): Primary | ICD-10-CM

## 2024-07-08 LAB
ALBUMIN SERPL BCG-MCNC: 3.5 G/DL (ref 3.5–5)
ALP SERPL-CCNC: 42 U/L (ref 34–104)
ALT SERPL W P-5'-P-CCNC: 6 U/L (ref 7–52)
ANION GAP SERPL CALCULATED.3IONS-SCNC: 14 MMOL/L (ref 4–13)
AST SERPL W P-5'-P-CCNC: 11 U/L (ref 13–39)
BASOPHILS # BLD AUTO: 0.02 THOUSANDS/ÂΜL (ref 0–0.1)
BASOPHILS NFR BLD AUTO: 0 % (ref 0–1)
BILIRUB SERPL-MCNC: 0.5 MG/DL (ref 0.2–1)
BUN SERPL-MCNC: 24 MG/DL (ref 5–25)
CALCIUM SERPL-MCNC: 9.5 MG/DL (ref 8.4–10.2)
CHLORIDE SERPL-SCNC: 94 MMOL/L (ref 96–108)
CO2 SERPL-SCNC: 26 MMOL/L (ref 21–32)
CREAT SERPL-MCNC: 1.11 MG/DL (ref 0.6–1.3)
CRP SERPL QL: 8.7 MG/L
EOSINOPHIL # BLD AUTO: 0.13 THOUSAND/ÂΜL (ref 0–0.61)
EOSINOPHIL NFR BLD AUTO: 2 % (ref 0–6)
ERYTHROCYTE [DISTWIDTH] IN BLOOD BY AUTOMATED COUNT: 14.1 % (ref 11.6–15.1)
ERYTHROCYTE [SEDIMENTATION RATE] IN BLOOD: 71 MM/HOUR (ref 0–19)
GFR SERPL CREATININE-BSD FRML MDRD: 65 ML/MIN/1.73SQ M
GLUCOSE SERPL-MCNC: 170 MG/DL (ref 65–140)
HCT VFR BLD AUTO: 38.2 % (ref 36.5–49.3)
HGB BLD-MCNC: 12.6 G/DL (ref 12–17)
IMM GRANULOCYTES # BLD AUTO: 0.02 THOUSAND/UL (ref 0–0.2)
IMM GRANULOCYTES NFR BLD AUTO: 0 % (ref 0–2)
LYMPHOCYTES # BLD AUTO: 1.14 THOUSANDS/ÂΜL (ref 0.6–4.47)
LYMPHOCYTES NFR BLD AUTO: 21 % (ref 14–44)
MCH RBC QN AUTO: 30.1 PG (ref 26.8–34.3)
MCHC RBC AUTO-ENTMCNC: 33 G/DL (ref 31.4–37.4)
MCV RBC AUTO: 91 FL (ref 82–98)
MONOCYTES # BLD AUTO: 0.56 THOUSAND/ÂΜL (ref 0.17–1.22)
MONOCYTES NFR BLD AUTO: 10 % (ref 4–12)
NEUTROPHILS # BLD AUTO: 3.49 THOUSANDS/ÂΜL (ref 1.85–7.62)
NEUTS SEG NFR BLD AUTO: 67 % (ref 43–75)
NRBC BLD AUTO-RTO: 0 /100 WBCS
PLATELET # BLD AUTO: 205 THOUSANDS/UL (ref 149–390)
PMV BLD AUTO: 10.1 FL (ref 8.9–12.7)
POTASSIUM SERPL-SCNC: 3 MMOL/L (ref 3.5–5.3)
PROT SERPL-MCNC: 6.8 G/DL (ref 6.4–8.4)
RBC # BLD AUTO: 4.19 MILLION/UL (ref 3.88–5.62)
SODIUM SERPL-SCNC: 134 MMOL/L (ref 135–147)
WBC # BLD AUTO: 5.36 THOUSAND/UL (ref 4.31–10.16)

## 2024-07-08 PROCEDURE — 86140 C-REACTIVE PROTEIN: CPT

## 2024-07-08 PROCEDURE — 80053 COMPREHEN METABOLIC PANEL: CPT

## 2024-07-08 PROCEDURE — 85652 RBC SED RATE AUTOMATED: CPT

## 2024-07-08 PROCEDURE — G0299 HHS/HOSPICE OF RN EA 15 MIN: HCPCS

## 2024-07-08 PROCEDURE — 36415 COLL VENOUS BLD VENIPUNCTURE: CPT

## 2024-07-08 PROCEDURE — 85025 COMPLETE CBC W/AUTO DIFF WBC: CPT

## 2024-07-08 NOTE — TELEPHONE ENCOUNTER
"Contacted Central Scheduling to schedule patient's MRI with sedation.    These can only be done in [place].  They spoke with MRI for approval. They can do this Wednesday. He is going to need a history and physical with his PCP before Wednesday.    Contacted PCP office, they refuse to speak to me about this patient. They do not wish to answer whether they have availability tomorrow to see \"a patient\" for history and physical to clear him for MRI with sedation.    LM for patient's wife to call me back so that she can call the PCP directly to try and schedule this.  "

## 2024-07-08 NOTE — TELEPHONE ENCOUNTER
Received call from [place] HAYNES). Patient did not tolerate the MRI as he had excruciating back pain. They evangelina blood today and are taking this to the lab. Patient has upcoming appointment, virtually. I recommended patient keep that visit.    Will route this to patient's provider for review.

## 2024-07-09 NOTE — TELEPHONE ENCOUNTER
Patients wife called the RX Refill Line. Message is being forwarded to the office.     Patients wife is requesting a call regarding the patients antibiotics. She stated they made an appt for the physical for Thursday, but the patient also only has enough medication to last until then and she is unsure what she should do. She did say they also have a virtual visit with  on Thursday but she did not want to wait until then to figure out what to do. Please review and advise the patient    Please contact patients wife  at

## 2024-07-09 NOTE — TELEPHONE ENCOUNTER
Spoke with patient's wife regarding abx plan. We will discuss refills at visit. She is aware I will call and schedule MRI. Patient scheduled July 22. They got this approved sooner than next available which is months out. Patients wife to keep appointment this week.

## 2024-07-09 NOTE — TELEPHONE ENCOUNTER
Spoke with patients wife. I told her MRI was willing to do the procedure Wednesday if patient was able to have physical for clearance with PCP prior to Wednesday. Pt's wife told me his PCP is in [place] and she wouldn't be able to get pt into the car to go to [place]. She will try to find a doctor locally that can see him. Pt has a virtual visit Thursday with Dr. Cuevas. I advised wife to let us know when his appointment is with the PCP at that visit and we can help schedule the procedure.

## 2024-07-10 NOTE — PRE-PROCEDURE INSTRUCTIONS
Pre-Surgery Instructions:   Medication Instructions    aspirin 81 mg chewable tablet Hold day of surgery.    Ceftriaxone Sodium 2 g SOLR Instructions provided by MD    insulin degludec (Tresiba FlexTouch) 100 units/mL injection pen Take night before surgery    insulin lispro (HumaLOG KwikPen) 100 units/mL injection pen Hold day of surgery.    metoprolol succinate (TOPROL-XL) 50 mg 24 hr tablet Hold day of surgery.    metroNIDAZOLE (FLAGYL) 500 mg tablet Hold day of surgery.    ramipril (ALTACE) 1.25 mg capsule Hold day of surgery.    Sodium Chloride Flush (Normal Saline Flush) 0.9 % SOLN Instructions provided by MD    torsemide (DEMADEX) 20 mg tablet Hold day of surgery.      PATIENT WILL HOLD MEDICATIONS THE MORNING OF MRI AND RESUME THEM ONCE HE IS HOME AFTER PROCEDURE    The patient should have nothing to eat or drink after 11 pm the night before the MRI. The patient may take their medications with a sip of water at least 2 hours prior to their arrival time.  You will receive a call the evening before your MRI appointment with additional instructions.      Please leave your jewelry and valuables at home, wedding rings are the exception.   Please bring your physician order, insurance cards, a form of photo ID and a list of your medications with you. Arrive 15 minutes prior to your appointment time in order to register. Please bring any prior CT or MRI studies of this area that were not performed at a [facility].

## 2024-07-11 ENCOUNTER — OFFICE VISIT (OUTPATIENT)
Dept: FAMILY MEDICINE CLINIC | Facility: CLINIC | Age: 74
End: 2024-07-11
Payer: MEDICARE

## 2024-07-11 ENCOUNTER — HOME CARE VISIT (OUTPATIENT)
Dept: HOME HEALTH SERVICES | Facility: HOME HEALTHCARE | Age: 74
End: 2024-07-11
Payer: MEDICARE

## 2024-07-11 ENCOUNTER — TELEPHONE (OUTPATIENT)
Dept: INFECTIOUS DISEASES | Facility: CLINIC | Age: 74
End: 2024-07-11

## 2024-07-11 ENCOUNTER — TELEMEDICINE (OUTPATIENT)
Dept: INFECTIOUS DISEASES | Facility: CLINIC | Age: 74
End: 2024-07-11
Payer: MEDICARE

## 2024-07-11 VITALS
HEIGHT: 68 IN | OXYGEN SATURATION: 98 % | RESPIRATION RATE: 16 BRPM | TEMPERATURE: 97.2 F | WEIGHT: 193 LBS | SYSTOLIC BLOOD PRESSURE: 92 MMHG | HEART RATE: 101 BPM | DIASTOLIC BLOOD PRESSURE: 66 MMHG | BODY MASS INDEX: 29.25 KG/M2

## 2024-07-11 DIAGNOSIS — E11.22 TYPE 2 DIABETES MELLITUS WITH STAGE 2 CHRONIC KIDNEY DISEASE, WITH LONG-TERM CURRENT USE OF INSULIN (HCC): ICD-10-CM

## 2024-07-11 DIAGNOSIS — G06.2 EPIDURAL ABSCESS: Primary | ICD-10-CM

## 2024-07-11 DIAGNOSIS — R78.81 BACTEREMIA: ICD-10-CM

## 2024-07-11 DIAGNOSIS — I50.42 CHRONIC COMBINED SYSTOLIC AND DIASTOLIC CONGESTIVE HEART FAILURE (HCC): ICD-10-CM

## 2024-07-11 DIAGNOSIS — M46.26 OSTEOMYELITIS OF LUMBAR SPINE (HCC): Primary | ICD-10-CM

## 2024-07-11 DIAGNOSIS — N18.2 TYPE 2 DIABETES MELLITUS WITH STAGE 2 CHRONIC KIDNEY DISEASE, WITH LONG-TERM CURRENT USE OF INSULIN (HCC): ICD-10-CM

## 2024-07-11 DIAGNOSIS — M46.26 OSTEOMYELITIS OF LUMBAR SPINE (HCC): ICD-10-CM

## 2024-07-11 DIAGNOSIS — Z01.818 PRE-OP EXAMINATION: ICD-10-CM

## 2024-07-11 DIAGNOSIS — Z79.4 TYPE 2 DIABETES MELLITUS WITH STAGE 2 CHRONIC KIDNEY DISEASE, WITH LONG-TERM CURRENT USE OF INSULIN (HCC): ICD-10-CM

## 2024-07-11 PROCEDURE — 99215 OFFICE O/P EST HI 40 MIN: CPT | Performed by: INTERNAL MEDICINE

## 2024-07-11 PROCEDURE — 99204 OFFICE O/P NEW MOD 45 MIN: CPT | Performed by: INTERNAL MEDICINE

## 2024-07-11 RX ORDER — METRONIDAZOLE 500 MG/1
500 TABLET ORAL EVERY 8 HOURS SCHEDULED
Qty: 60 TABLET | Refills: 0 | Status: SHIPPED | OUTPATIENT
Start: 2024-07-11 | End: 2024-07-31

## 2024-07-11 RX ORDER — TIZANIDINE HYDROCHLORIDE 4 MG/1
4 CAPSULE, GELATIN COATED ORAL
COMMUNITY
Start: 2024-05-23

## 2024-07-11 NOTE — PROGRESS NOTES
Progress Note - Infectious Disease   Capo Pepper 74 y.o. male MRN: [id number]  Unit/Bed#:  Encounter: [id number]      Impression/Plan:  1. (Tests ordered)  2.   3.   4.   5.     Above plan was discussed in detail with the Patient.   Above plan will be sent to the following providers for review***  Above plan was discussed with other providers*** (Pharm/ID colleague)    RTO     I have spent a total time of *** (35/40) minutes on 07/11/24 in caring for this patient including {AMB Counseling Topics:[id number]}.    Antibiotics:      Subjective:  Patient has no fever, chills, sweats; no nausea, vomiting, diarrhea; no cough, shortness of breath; no pain. No new symptoms.    History from independent historian***    ROS: A complete 12 point ROS is negative other than that noted in the HPI    Followup portions patient history reviewed and updated as:  Allergies, current medications, past medical history, past social history, past surgical history, and the problem list    Objective:  Vitals:  There were no vitals filed for this visit.    Physical Exam:   General Appearance:  Alert, interactive, appearing well, nontoxic, no acute distress.   Throat: Oropharynx moist without lesions.    Lungs:   Clear to auscultation bilaterally; no audible wheezes, rhonchi or rales; respirations unlabored   Heart:  RRR; no murmur, rub or gallop   Abdomen:   Soft, non-tender, non-distended, positive bowel sounds.     Extremities: No clubbing, cyanosis or edema   Skin: No new rashes or lesions. No new draining wounds.       Labs, Imaging, & Other studies:   All pertinent labs and imaging studies were personally reviewed as below    Lab Results   Component Value Date    K 3.0 (L) 07/08/2024    CL 94 (L) 07/08/2024    CO2 26 07/08/2024    BUN 24 07/08/2024    CREATININE 1.11 07/08/2024    CALCIUM 9.5 07/08/2024    CORRECTEDCA 9.2 06/01/2024    AST 11 (L) 07/08/2024    ALT 6 (L) 07/08/2024    ALKPHOS 42 07/08/2024    EGFR 65 07/08/2024  "    Lab Results   Component Value Date    WBC 5.36 07/08/2024    HGB 12.6 07/08/2024    HCT 38.2 07/08/2024    MCV 91 07/08/2024     07/08/2024   No results found for: \"SEDRATE\"    Recent Lab interpretation/comments:***  Recent Imaging interpretation/comments:***  Culture data:***  External/inpatient provider notes:***   "

## 2024-07-11 NOTE — PROGRESS NOTES
Virtual Regular Visit  Name: Capo Pepper      : 1950      MRN: [id number]  Encounter Provider: Pili Cuevas MD  Encounter Date: 2024   Encounter department: [facility]    Verification of patient location:    Patient is located at Home in the following state in which I hold an active license [place]    Telemedicine consent    The patient was identified by name and date of birth. Capo Pepper was informed that this is a telemedicine visit and that the visit is being conducted through the Epic Embedded platform. He agrees to proceed..  My office door was closed. No one else was in the room.  He acknowledged consent and understanding of privacy and security of the video platform. The patient has agreed to participate and understands they can discontinue the visit at any time.    Patient is aware this is a billable service.     I spent 20 minutes with the patient during this video visit.     Progress Note - Infectious Disease   Capo Pepper 74 y.o. male MRN: [id number]  Unit/Bed#:  Encounter: [id number]      Impression/Plan:  1.  Osteomyelitis of L2-3 with epidural abscess and myositis.  Patient presented initially with acute on chronic back pain.  Was noted to have progressive symptoms of fatigue and illness for the past few months.  Found to have #2 on admission as well.  MR imaging confirmed infection in the lumbar spine.  Biopsy deferred by IR.  Primary suspicion is for occult secondary seeding of this site either from a dental source or GI source based on workup/history.  Patient remains on antibiotics until adequate abscess resolution on imaging.  He is unfortunately pending imaging at this time.  Inflammatory markers essentially stable and labs otherwise unremarkable.  Unfortunately pain symptoms have been limiting imaging as well as mobility/ability to get to appointments.  Patient feels pain symptoms fluctuating and now seem to be worse as he is being more  active.  Continue on ceftriaxone 2 g every 24 hours  Continue on Flagyl 500 mg every 8 hours  Maintain current PICC line  Will extend antibiotics through 7/31 pending imaging  Patient plan for MRI with sedation on 7/22  Patient to obtain primary provider clearance for MR imaging  Patient to discuss muscle relaxer with primary  Refills of oral antibiotic provided to pharmacy  Continue weekly labs with CBCD, CMP, ESR and CRP while on antibiotic  Office staff to forward IV antibiotic orders to [facility]/infusion  Patient plans to see dental provider in the beginning of August  Encourage patient to establish appointment at least with GI for September  Anticipate transition to oral antibiotic at next appointment if abscess resolves  Given slow improvement, anticipate trending markers with oral antibiotic  Tentative follow-up in the office on 7/31, can be virtual  Plan to follow-up with neurosurgery post MRI, may need brace/PT  Additional questions answered    2.  Bacteroides bacteremia.  Suspect occult source from the GI tract or dental infection leading to #1.  Anticipate though polymicrobial process involving #1.  Continue antibiotic as above  No plans for repeat cultures  Tentative plans for imaging as above    3.  Poorly controlled type 2 diabetes.  Recent hemoglobin A1c in the hospital was 9.4.  Likely risk factor for the above.  Plans to establish primary care provider today.    Above plan discussed in detail with the patient and his wife over video visit  Will forward office visit to PCP as well as neurosurgery provider.    RTO on 7/31 with imaging    I have spent a total time of 40 minutes on 07/11/24 in caring for this patient including Diagnostic results, Risks and benefits of tx options, Instructions for management, Impressions, Documenting in the medical record, Reviewing / ordering tests, medicine, procedures  , Obtaining or reviewing history  , and Communicating with other healthcare professionals  .    Antibiotics:  Ceftriaxone/Flagyl through 7/15    Subjective:  Briefly this patient is a 74-year-old gentleman with heart failure/cardiomyopathy, prior stroke, type 2 diabetes and chronic low back pain from previous accident in the 70s.  He was seen by our service in June for progressive back pain for 2 weeks but on further questioning was found to have issues of progressive fatigue and feeling unwell for months.  Patient on that admission was found to have Bacteroides bacteremia.  And suspicion overall was for an occult dental source.  CT of the face with multiple dental issues.  Other consideration was for an occult GI source given patient has not had prior colonoscopy.  2D echo at the time was unremarkable.  No SHANELLE was pursued as it would not .  In regards to the patient's back pain patient underwent MR imaging which was notable for osteomyelitis at L2-3 with epidural abscess and changes of myositis.  Patient was ultimately recommended for at minimum 6-week course of IV antibiotics with plans for repeat imaging prior to transition to oral therapy and trending inflammatory markers.  He presents now for follow-up.    Patient denies having any significant diarrhea or rash or itching with antibiotics.  He has been having some stomach issues with the oral antibiotics for which she is taking a lot of yogurt and food to counteract.  His wife is present for the visit and she reports that they have run out of intravenous antibiotic as of today and have about 4 days on hand of oral antibiotic.  Patient unfortunately was unable to tolerate MRI and so we have reordered the imaging with sedation and he is going to see his primary care provider today to obtain clearance for the MRI.  He is also going to discuss possibly remaining on Flexeril as needed for when he goes to appointments given the degree of pain he is having with movement and mobility.  Unfortunately his back pain had improved initially but  now seems to be regressing with more activity.  I discussed with both him and his wife that it is uncertain if he has ongoing back discomfort is due to developing chronic damage from his infection or failure to improve in terms of his infection and abscess on his imaging.  They are going to try to have the imaging completed on 7/22 and they have follow-up with neurosurgery then on 7/26.  We discussed extending IV antibiotic course now through 7/31 with a plan to hopefully follow imaging at that time and potentially transition to oral therapy depending on inflammatory markers.  Patient plans to see the dentist in the beginning of August and I encouraged him to start to schedule at least visits with GI in September to then start to get further workup on getting his colonoscopy done.  Additionally in the hospital on review CT chest/abdomen/pelvis showed diverticulosis but no other acute inflammatory process in the abdomen.  Current PICC line is functioning well after having to be replaced.  Office staff to contact visiting nurse and we will send further labs and medications to pharmacy.  Pharmacy confirmed.  Lastly confirmed with both the patient and his wife that the MRI planned is with sedation and not anesthesia as the patient primarily has pain issue not necessarily claustrophobia.  Additional questions were answered.    ROS: A complete 12 point ROS is negative other than that noted in the HPI    Followup portions patient history reviewed and updated as:  Allergies, current medications, past medical history, past social history, past surgical history, and the problem list    Objective:  Vitals:  There were no vitals filed for this visit.    Physical Exam:   General Appearance:  Alert, interactive, appearing well, nontoxic, no acute distress.   Throat: Oral mucosa moist without lesions.    Lungs:   No acute respiratory distress noted.  Patient currently on room air.   Abdomen:   Visibly does not appear distended.  "  Extremities: No clubbing, cyanosis or edema in the upper extremities visualized   Skin: No new rashes or lesions. No new draining wounds.  On exposed skin on video       Labs, Imaging, & Other studies:   All pertinent labs and imaging studies were personally reviewed as below    Lab Results   Component Value Date    K 3.0 (L) 07/08/2024    CL 94 (L) 07/08/2024    CO2 26 07/08/2024    BUN 24 07/08/2024    CREATININE 1.11 07/08/2024    CALCIUM 9.5 07/08/2024    CORRECTEDCA 9.2 06/01/2024    AST 11 (L) 07/08/2024    ALT 6 (L) 07/08/2024    ALKPHOS 42 07/08/2024    EGFR 65 07/08/2024     Lab Results   Component Value Date    WBC 5.36 07/08/2024    HGB 12.6 07/08/2024    HCT 38.2 07/08/2024    MCV 91 07/08/2024     07/08/2024   No results found for: \"SEDRATE\"    Recent Lab interpretation/comments: Most recent labs on 7/8 with stagnant sed rate and CRP.  Chemistry relatively stable and CBC unremarkable.    Recent Imaging interpretation/comments: Patient pending MRI on 7/22    Culture data: No new cultures    External/inpatient provider notes: None  "

## 2024-07-11 NOTE — ASSESSMENT & PLAN NOTE
Unfortunately when his workup was completed found that he had osteomyelitis of the spine and is been on the antibiotics for weeks. They want to do another seat MRI of his spine and he has a difficult time laying on his back. So he will have conscious sedation from what I understand. He states that that went okay but I will not be surprised if he will need to stay overnight. He has not done well in the past with. Anesthesia

## 2024-07-11 NOTE — ASSESSMENT & PLAN NOTE
Wt Readings from Last 3 Encounters:   07/11/24 87.5 kg (193 lb)   06/24/24 93.9 kg (207 lb)   06/05/24 94.3 kg (207 lb 14.3 oz)     The patient was acutely built ill his ejection fraction was markedly reduced. However his last one was actually pretty normal

## 2024-07-11 NOTE — PROGRESS NOTES
Ambulatory Visit  Name: Capo Pepper      : 1950      MRN: [id number]  Encounter Provider: Niecy Le MD  Encounter Date: 2024   Encounter department: [facility]    Assessment & Plan   1. Bacteremia  2. Osteomyelitis of lumbar spine (HCC)  3. Chronic combined systolic and diastolic congestive heart failure (HCC)  4. Type 2 diabetes mellitus with stage 2 chronic kidney disease, with long-term current use of insulin (HCC)      Depression Screening and Follow-up Plan: Patient was screened for depression during today's encounter. They screened negative with a PHQ-2 score of 0.      History of Present Illness     Patient for most of this year has been sick with severe back pain and bacteremia and spinal infection. The cause of which is unclear but may be related to dental abscesses. He complains of constant and severe pain in his back and has difficulty laying on his back. He also states he does not do well with Debby all that the twilight sedation the last time was not that bad. The good side his cardiac function has improved dramatically. He he is cleared for conscious sedation but will not be surprised if he stays overnight due to sedation        Review of Systems   Constitutional:  Positive for fatigue and unexpected weight change. Negative for chills and fever.   HENT:  Positive for mouth sores (dental abscesses). Negative for ear pain and sore throat.    Eyes:  Negative for pain and visual disturbance.   Respiratory:  Negative for cough, chest tightness, shortness of breath and wheezing.    Cardiovascular:  Negative for chest pain, palpitations and leg swelling.   Gastrointestinal:  Negative for abdominal pain, diarrhea and vomiting.   Genitourinary:  Negative for dysuria, flank pain, frequency, hematuria and urgency.   Musculoskeletal:  Positive for arthralgias, back pain and gait problem.   Skin:  Negative for color change, rash and wound.   Neurological:  Negative for  "seizures and syncope.   Hematological: Negative.    Psychiatric/Behavioral: Negative.     All other systems reviewed and are negative.      Objective     BP 92/66 (BP Location: Right arm, Patient Position: Sitting, Cuff Size: Large)   Pulse 101   Temp (!) 97.2 °F (36.2 °C) (Temporal)   Resp 16   Ht 5' 7.68\" (1.719 m)   Wt 87.5 kg (193 lb)   SpO2 98%   BMI 29.62 kg/m²     Physical Exam  Vitals and nursing note reviewed.   Constitutional:       General: He is not in acute distress.     Appearance: He is well-developed. He is ill-appearing.   HENT:      Head: Normocephalic and atraumatic.      Right Ear: Tympanic membrane normal.      Left Ear: Tympanic membrane normal.      Nose: Nose normal.      Mouth/Throat:      Mouth: Mucous membranes are dry.   Eyes:      Conjunctiva/sclera: Conjunctivae normal.   Neck:      Vascular: No carotid bruit.   Cardiovascular:      Rate and Rhythm: Normal rate and regular rhythm.      Pulses: Normal pulses.      Heart sounds: Normal heart sounds. No murmur heard.  Pulmonary:      Effort: Pulmonary effort is normal. No respiratory distress.      Breath sounds: Normal breath sounds. No rhonchi or rales.   Abdominal:      General: Abdomen is flat.      Palpations: Abdomen is soft.      Tenderness: There is no abdominal tenderness.   Musculoskeletal:         General: No swelling.      Cervical back: Neck supple.      Right lower leg: No edema.      Left lower leg: No edema.      Comments: Picc right arm   Lymphadenopathy:      Cervical: No cervical adenopathy.   Skin:     General: Skin is warm and dry.      Capillary Refill: Capillary refill takes less than 2 seconds.      Findings: No lesion or rash.   Neurological:      General: No focal deficit present.      Mental Status: He is alert and oriented to person, place, and time. Mental status is at baseline.      Cranial Nerves: No cranial nerve deficit.      Motor: Weakness present.      Gait: Gait abnormal.   Psychiatric:         " Mood and Affect: Mood normal.         Behavior: Behavior normal.         Thought Content: Thought content normal.         Judgment: Judgment normal.       Administrative Statements

## 2024-07-11 NOTE — ASSESSMENT & PLAN NOTE
Patient's recent A1c was terrible 9.4 she states his blood sugars limits checked recently have been low hundreds. He is very resistant to any treatment for same. However it however he is on a long acting and short acting insulin at present  Lab Results   Component Value Date    HGBA1C 9.4 (H) 06/01/2024

## 2024-07-11 NOTE — TELEPHONE ENCOUNTER
Spoke with Elena at [facility].   Confirmed extension of abx.  Faxed extension and additional lab orders to PATR[facility].

## 2024-07-11 NOTE — ASSESSMENT & PLAN NOTE
He remains on IV antibiotics and has no upcoming appointment with a dentist to see about pulling several teeth with abscesses case that is the cause of his sepsis.

## 2024-07-11 NOTE — PATIENT INSTRUCTIONS
-Please plan to continue antibiotics through 7/31  -Refills of medication sent to your pharmacy  -Please obtain clearance for your MRI from primary care provider.  Imaging is scheduled for 7/22.  -Please plan to follow-up with neurosurgery as well after your visit  -Please call with questions

## 2024-07-12 ENCOUNTER — TELEPHONE (OUTPATIENT)
Dept: INFECTIOUS DISEASES | Facility: CLINIC | Age: 74
End: 2024-07-12

## 2024-07-12 ENCOUNTER — TELEPHONE (OUTPATIENT)
Dept: FAMILY MEDICINE CLINIC | Facility: CLINIC | Age: 74
End: 2024-07-12

## 2024-07-12 NOTE — ASSESSMENT & PLAN NOTE
Has had cardiac issues his past year his ejection fraction is currently up into the normal range. He also has been treated almost continuously with antibiotics for and osteomyelitis of the spine. The patient tells me that he does not do well with general anesthesia but that he did okay when he had twilight sedation. Therefore he should be medically cleared have his MRI with anesthesia

## 2024-07-12 NOTE — TELEPHONE ENCOUNTER
"Attempt to contact patient's PCP via telephone, however phone is not working at this time.    Patient clearance needs to be via a form called \"history and physical\". Reached out to provider Niecy Le to relay this message, as the current format will not be accepted to clear patient per Shellie at procedure scheduling.      "

## 2024-07-12 NOTE — PROGRESS NOTES
Ambulatory Visit  Name: Capo Pepper      : 1950      MRN: [id number]  Encounter Provider: Niecy Le MD  Encounter Date: 2024   Encounter department: [facility]    Assessment & Plan   1. Osteomyelitis of lumbar spine (HCC)  Assessment & Plan:  Unfortunately when his workup was completed found that he had osteomyelitis of the spine and is been on the antibiotics for weeks. They want to do another seat MRI of his spine and he has a difficult time laying on his back. So he will have conscious sedation from what I understand. He states that that went okay but I will not be surprised if he will need to stay overnight. He has not done well in the past with. Anesthesia  2. Bacteremia  Assessment & Plan:  He remains on IV antibiotics and has no upcoming appointment with a dentist to see about pulling several teeth with abscesses case that is the cause of his sepsis.  3. Chronic combined systolic and diastolic congestive heart failure (HCC)  Assessment & Plan:  Wt Readings from Last 3 Encounters:   24 87.5 kg (193 lb)   24 93.9 kg (207 lb)   24 94.3 kg (207 lb 14.3 oz)     The patient was acutely built ill his ejection fraction was markedly reduced. However his last one was actually pretty normal        4. Type 2 diabetes mellitus with stage 2 chronic kidney disease, with long-term current use of insulin ([facility])  Assessment & Plan:  Patient's recent A1c was terrible 9.4 she states his blood sugars limits checked recently have been low hundreds. He is very resistant to any treatment for same. However it however he is on a long acting and short acting insulin at present  Lab Results   Component Value Date    HGBA1C 9.4 (H) 2024     5. Pre-op examination  Assessment & Plan:  Has had cardiac issues his past year his ejection fraction is currently up into the normal range. He also has been treated almost continuously with antibiotics for and osteomyelitis of the spine.  The patient tells me that he does not do well with general anesthesia but that he did okay when he had twilight sedation. Therefore he should be medically cleared have his MRI with anesthesia      Depression Screening and Follow-up Plan: Patient was screened for depression during today's encounter. They screened negative with a PHQ-2 score of 0.        History of Present Illness     Patient has had an ongoing going méndez with osteomyelitis of the spine for most of 2024. He continues to complain of significant pain and is on IV antibiotics at home. He now is rescheduled to have an MRI of his back but will need sedation for it. He denies any chest pain shortness of breath or edema at present. His big complaint is back pain. In the past he has had some difficulty with anesthesia has done better now that he is lost weight and is deceiving usually conscious sedation      Review of Systems   Constitutional:  Positive for fatigue. Negative for activity change, chills and fever.   HENT: Negative.  Negative for congestion.    Eyes:  Negative for pain, redness and visual disturbance.   Respiratory:  Positive for cough and shortness of breath (occ). Negative for choking, chest tightness, wheezing and stridor.    Cardiovascular:  Negative for chest pain, palpitations and leg swelling.   Gastrointestinal: Negative.    Endocrine: Negative.    Genitourinary: Negative.    Musculoskeletal:  Positive for back pain.   Skin: Negative.    Allergic/Immunologic: Negative for immunocompromised state.   Neurological:  Negative for dizziness, speech difficulty and light-headedness.   Hematological:  Does not bruise/bleed easily.   Psychiatric/Behavioral:  Positive for dysphoric mood. Negative for decreased concentration and suicidal ideas. The patient is nervous/anxious.      Past Medical History:   Diagnosis Date    Atypical chest pain     Back pain     CHF (congestive heart failure) ([facility])     Constipation 06/01/2024    Diabetes mellitus  (HCC)     Legionnaire's disease (HCC)     Leukocytosis 2024    Lung disorder     Migraine     PONV (postoperative nausea and vomiting)      Past Surgical History:   Procedure Laterality Date    FOOT SURGERY Bilateral     plantar fasciitis    IR OTHER  2024    OTHER SURGICAL HISTORY      left arm    ROTATOR CUFF REPAIR Bilateral     TONSILLECTOMY      TRIGGER FINGER RELEASE       History reviewed. No pertinent family history.  Social History     Tobacco Use    Smoking status: Former     Current packs/day: 0.00     Average packs/day: 1 pack/day for 25.0 years (25.0 ttl pk-yrs)     Types: Cigarettes, Cigars     Start date:      Quit date:      Years since quittin.5    Smokeless tobacco: Never   Vaping Use    Vaping status: Never Used   Substance and Sexual Activity    Alcohol use: Yes     Comment: occ    Drug use: Not Currently     Comment: CBD Oil pt stated no longer using    Sexual activity: Not on file     Current Outpatient Medications on File Prior to Visit   Medication Sig    Alcohol Swabs 70 % PADS May substitute brand based on insurance coverage. Check glucose TID.    aspirin 81 mg chewable tablet Chew 81 mg daily    glucose blood (OneTouch Verio) test strip May substitute brand based on insurance coverage. Check glucose TID.    metoprolol succinate (TOPROL-XL) 50 mg 24 hr tablet Take 50 mg by mouth daily. Indications: .    metroNIDAZOLE (FLAGYL) 500 mg tablet Take 1 tablet (500 mg total) by mouth every 8 (eight) hours for 20 days    OneTouch Delica Lancets 33G MISC May substitute brand based on insurance coverage. Check glucose TID.    potassium chloride (Klor-Con M20) 20 mEq tablet Take 20 mEq by mouth daily. Indications: .    ramipril (ALTACE) 1.25 mg capsule Take 1.25 mg by mouth daily.    Sodium Chloride Flush (Normal Saline Flush) 0.9 % SOLN Inject 10-20 mL into a catheter in a vein in the morning. Before and after Abx and as needed for flushing  Indications: .    TiZANidine  "(ZANAFLEX) 4 MG capsule Take 4 mg by mouth    Blood Glucose Monitoring Suppl (OneTouch Verio Reflect) w/Device KIT May substitute brand based on insurance coverage. Check glucose TID. (Patient not taking: Reported on 7/11/2024)    cefTRIAXone (ROCEPHIN) 10 g injection  (Patient not taking: Reported on 7/11/2024)    Ceftriaxone Sodium 2 g SOLR Inject 2 g into a catheter in a vein every 24 hours. 20 ml solution over 5 minutes IV push  Indications: . (Patient not taking: Reported on 7/11/2024)    insulin degludec (Tresiba FlexTouch) 100 units/mL injection pen Inject 32 Units under the skin daily at bedtime (Patient not taking: Reported on 7/11/2024)    insulin lispro (HumaLOG KwikPen) 100 units/mL injection pen Inject 10 Units under the skin 3 (three) times a day with meals (Patient not taking: Reported on 7/11/2024)    Insulin Pen Needle (BD Pen Needle Catherine 2nd Gen) 32G X 4 MM MISC For use with insulin pen. Pharmacy may dispense brand covered by insurance. (Patient not taking: Reported on 7/11/2024)    Insulin Pen Needle (BD Pen Needle Catherine 2nd Gen) 32G X 4 MM MISC For use with insulin pen. Pharmacy may dispense brand covered by insurance. (Patient not taking: Reported on 7/11/2024)    torsemide (DEMADEX) 20 mg tablet Take 20 mg by mouth daily. Indications: . (Patient not taking: Reported on 7/11/2024)     Allergies   Allergen Reactions    Poultry Meal - Food Allergy        There is no immunization history on file for this patient.  Objective     BP 92/66 (BP Location: Right arm, Patient Position: Sitting, Cuff Size: Large)   Pulse 101   Temp (!) 97.2 °F (36.2 °C) (Temporal)   Resp 16   Ht 5' 7.68\" (1.719 m)   Wt 87.5 kg (193 lb)   SpO2 98%   BMI 29.62 kg/m²     Physical Exam  Vitals reviewed.   Constitutional:       General: He is not in acute distress.     Appearance: He is normal weight. He is ill-appearing.   HENT:      Head: Normocephalic and atraumatic.      Right Ear: Tympanic membrane normal.      Left " Ear: Tympanic membrane normal.      Nose: Nose normal.      Mouth/Throat:      Mouth: Mucous membranes are moist.   Eyes:      Extraocular Movements: Extraocular movements intact.      Pupils: Pupils are equal, round, and reactive to light.   Neck:      Vascular: No carotid bruit.   Cardiovascular:      Rate and Rhythm: Normal rate and regular rhythm.      Pulses: Normal pulses.      Heart sounds: Murmur (2/6 systolic murmur) heard.   Pulmonary:      Effort: Pulmonary effort is normal.      Breath sounds: Normal breath sounds. No wheezing, rhonchi or rales.   Abdominal:      General: Abdomen is flat. Bowel sounds are normal.      Palpations: Abdomen is soft.   Musculoskeletal:      Cervical back: Normal range of motion and neck supple.      Right lower leg: No edema.      Left lower leg: No edema.      Comments: PICC line Right arm   Lymphadenopathy:      Cervical: No cervical adenopathy.   Skin:     Findings: No rash.   Neurological:      Mental Status: He is alert. Mental status is at baseline.      Gait: Gait abnormal (uses walker).   Psychiatric:         Mood and Affect: Mood normal.         Behavior: Behavior normal.         Judgment: Judgment normal.      Comments: Difficult to get a concise history

## 2024-07-12 NOTE — TELEPHONE ENCOUNTER
"Called pt to schedule follow up/wellness visit. Pt declined to schedule anything at this time. He says that his back hurts too much to travel and doesn't want to go anywhere if he doesn't have to, as being in the car is too painful and it \"screws up\" his back.    ----- Message from Idalmis MG sent at 7/11/2024  5:07 PM EDT -----  Regarding: AWV/HM  Patient left without fup appt. Patient due for AWV and HM items, please schedule ASAP  "

## 2024-07-15 ENCOUNTER — TELEPHONE (OUTPATIENT)
Dept: INFECTIOUS DISEASES | Facility: CLINIC | Age: 74
End: 2024-07-15

## 2024-07-15 ENCOUNTER — HOME CARE VISIT (OUTPATIENT)
Dept: HOME HEALTH SERVICES | Facility: HOME HEALTHCARE | Age: 74
End: 2024-07-15
Payer: MEDICARE

## 2024-07-15 ENCOUNTER — APPOINTMENT (OUTPATIENT)
Dept: LAB | Facility: MEDICAL CENTER | Age: 74
End: 2024-07-15
Payer: MEDICARE

## 2024-07-15 VITALS
DIASTOLIC BLOOD PRESSURE: 58 MMHG | OXYGEN SATURATION: 94 % | TEMPERATURE: 98.6 F | RESPIRATION RATE: 20 BRPM | HEART RATE: 76 BPM | SYSTOLIC BLOOD PRESSURE: 106 MMHG

## 2024-07-15 DIAGNOSIS — R78.81 BACTEREMIA: ICD-10-CM

## 2024-07-15 DIAGNOSIS — M46.26 OSTEOMYELITIS OF LUMBAR SPINE (HCC): ICD-10-CM

## 2024-07-15 LAB
ALBUMIN SERPL BCG-MCNC: 3.6 G/DL (ref 3.5–5)
ALP SERPL-CCNC: 47 U/L (ref 34–104)
ALT SERPL W P-5'-P-CCNC: 8 U/L (ref 7–52)
ANION GAP SERPL CALCULATED.3IONS-SCNC: 14 MMOL/L (ref 4–13)
AST SERPL W P-5'-P-CCNC: 14 U/L (ref 13–39)
BASOPHILS # BLD AUTO: 0.02 THOUSANDS/ÂΜL (ref 0–0.1)
BASOPHILS NFR BLD AUTO: 0 % (ref 0–1)
BILIRUB SERPL-MCNC: 0.27 MG/DL (ref 0.2–1)
BUN SERPL-MCNC: 26 MG/DL (ref 5–25)
CALCIUM SERPL-MCNC: 8.7 MG/DL (ref 8.4–10.2)
CHLORIDE SERPL-SCNC: 95 MMOL/L (ref 96–108)
CO2 SERPL-SCNC: 27 MMOL/L (ref 21–32)
CREAT SERPL-MCNC: 1 MG/DL (ref 0.6–1.3)
CRP SERPL QL: 3.5 MG/L
EOSINOPHIL # BLD AUTO: 0.13 THOUSAND/ÂΜL (ref 0–0.61)
EOSINOPHIL NFR BLD AUTO: 2 % (ref 0–6)
ERYTHROCYTE [DISTWIDTH] IN BLOOD BY AUTOMATED COUNT: 14 % (ref 11.6–15.1)
ERYTHROCYTE [SEDIMENTATION RATE] IN BLOOD: 51 MM/HOUR (ref 0–19)
GFR SERPL CREATININE-BSD FRML MDRD: 73 ML/MIN/1.73SQ M
GLUCOSE SERPL-MCNC: 142 MG/DL (ref 65–140)
HCT VFR BLD AUTO: 39.5 % (ref 36.5–49.3)
HGB BLD-MCNC: 13 G/DL (ref 12–17)
IMM GRANULOCYTES # BLD AUTO: 0.02 THOUSAND/UL (ref 0–0.2)
IMM GRANULOCYTES NFR BLD AUTO: 0 % (ref 0–2)
LYMPHOCYTES # BLD AUTO: 1.33 THOUSANDS/ÂΜL (ref 0.6–4.47)
LYMPHOCYTES NFR BLD AUTO: 23 % (ref 14–44)
MCH RBC QN AUTO: 30.1 PG (ref 26.8–34.3)
MCHC RBC AUTO-ENTMCNC: 32.9 G/DL (ref 31.4–37.4)
MCV RBC AUTO: 91 FL (ref 82–98)
MONOCYTES # BLD AUTO: 0.58 THOUSAND/ÂΜL (ref 0.17–1.22)
MONOCYTES NFR BLD AUTO: 10 % (ref 4–12)
NEUTROPHILS # BLD AUTO: 3.7 THOUSANDS/ÂΜL (ref 1.85–7.62)
NEUTS SEG NFR BLD AUTO: 65 % (ref 43–75)
NRBC BLD AUTO-RTO: 0 /100 WBCS
PLATELET # BLD AUTO: 248 THOUSANDS/UL (ref 149–390)
PMV BLD AUTO: 10.4 FL (ref 8.9–12.7)
POTASSIUM SERPL-SCNC: 3.6 MMOL/L (ref 3.5–5.3)
PROT SERPL-MCNC: 6.3 G/DL (ref 6.4–8.4)
RBC # BLD AUTO: 4.32 MILLION/UL (ref 3.88–5.62)
SODIUM SERPL-SCNC: 136 MMOL/L (ref 135–147)
WBC # BLD AUTO: 5.78 THOUSAND/UL (ref 4.31–10.16)

## 2024-07-15 PROCEDURE — 85652 RBC SED RATE AUTOMATED: CPT

## 2024-07-15 PROCEDURE — 80053 COMPREHEN METABOLIC PANEL: CPT

## 2024-07-15 PROCEDURE — G0299 HHS/HOSPICE OF RN EA 15 MIN: HCPCS

## 2024-07-15 PROCEDURE — 85025 COMPLETE CBC W/AUTO DIFF WBC: CPT

## 2024-07-15 PROCEDURE — 86140 C-REACTIVE PROTEIN: CPT

## 2024-07-15 PROCEDURE — 36415 COLL VENOUS BLD VENIPUNCTURE: CPT

## 2024-07-15 NOTE — TELEPHONE ENCOUNTER
Spoke with alvino at procedure scheduling after speaking with Dr. Le regarding patients clearance for MRI.     Alvino states she sees some of the items needed, she will have her boss review and they can contact us if anything further will be needed.

## 2024-07-22 ENCOUNTER — HOSPITAL ENCOUNTER (OUTPATIENT)
Dept: RADIOLOGY | Facility: HOSPITAL | Age: 74
Discharge: HOME | End: 2024-07-22
Attending: INTERNAL MEDICINE
Payer: MEDICARE

## 2024-07-22 VITALS
SYSTOLIC BLOOD PRESSURE: 120 MMHG | WEIGHT: 189 LBS | RESPIRATION RATE: 20 BRPM | HEIGHT: 70 IN | TEMPERATURE: 97.4 F | HEART RATE: 83 BPM | BODY MASS INDEX: 27.06 KG/M2 | OXYGEN SATURATION: 96 % | DIASTOLIC BLOOD PRESSURE: 81 MMHG

## 2024-07-22 DIAGNOSIS — M46.26 OSTEOMYELITIS OF LUMBAR SPINE (HCC): ICD-10-CM

## 2024-07-22 DIAGNOSIS — R78.81 BACTEREMIA: ICD-10-CM

## 2024-07-22 LAB — GLUCOSE SERPL-MCNC: 135 MG/DL (ref 65–140)

## 2024-07-22 PROCEDURE — A9585 GADOBUTROL INJECTION: HCPCS | Performed by: INTERNAL MEDICINE

## 2024-07-22 PROCEDURE — 82948 REAGENT STRIP/BLOOD GLUCOSE: CPT

## 2024-07-22 PROCEDURE — 72158 MRI LUMBAR SPINE W/O & W/DYE: CPT

## 2024-07-22 RX ORDER — GADOBUTROL 604.72 MG/ML
8 INJECTION INTRAVENOUS
Status: COMPLETED | OUTPATIENT
Start: 2024-07-22 | End: 2024-07-22

## 2024-07-22 RX ORDER — SODIUM CHLORIDE, SODIUM LACTATE, POTASSIUM CHLORIDE, CALCIUM CHLORIDE 600; 310; 30; 20 MG/100ML; MG/100ML; MG/100ML; MG/100ML
125 INJECTION, SOLUTION INTRAVENOUS CONTINUOUS
Status: DISCONTINUED | OUTPATIENT
Start: 2024-07-22 | End: 2024-07-23 | Stop reason: HOSPADM

## 2024-07-22 RX ADMIN — GADOBUTROL 8 ML: 604.72 INJECTION INTRAVENOUS at 15:29

## 2024-07-22 RX ADMIN — SODIUM CHLORIDE, SODIUM LACTATE, POTASSIUM CHLORIDE, AND CALCIUM CHLORIDE 125 ML/HR: .6; .31; .03; .02 INJECTION, SOLUTION INTRAVENOUS at 13:15

## 2024-07-22 NOTE — PROGRESS NOTES
Patient not NPO per guidelines.  NPO as of 10am requiring 6 hrs, would be eligible for anesthesia as of 1600.  Discussed options with patient given MRI schedule, NPO time, urgency of procedure.  Offered 1600 for scan, but patient wished to proceed without anesthesia.    Keyur Castellano

## 2024-07-22 NOTE — LETTER
[facility]  [address] [place] [address]      July 25, 2024    MRN: [id number]     Phone: [phone]     Dear Mr. Mendezflorentin,    You recently had a(n) MRI performed on 7/22/2024 at  [facility] that was requested by Pili Cuevas MD. The study was reviewed by a radiologist, which is a physician who specializes in medical imaging. The radiologist issued a report describing his or her findings. In that report there was a finding that the radiologist felt warranted further discussion with your health care provider and that discussion would be beneficial to you.      The results were sent to Pili Cuevas MD on 07/22/2024  6:46 PM. We recommend that you contact Pili Cuevas MD at [phone] or set up an appointment to discuss the results of the imaging test. If you have already heard from Pili Cuevas MD regarding the results of your study, you can disregard this letter.     This letter is not meant to alarm you, but intended to encourage you to follow-up on your results with the provider that sent you for the imaging study. In addition, we have enclosed answers to frequently asked questions by other patients who have also received a letter to review results with their health care provider (see page two).      Thank you for choosing [facility] for your medical imaging needs.                                                                                                                                                        FREQUENTLY ASKED QUESTIONS    Why am I receiving this letter?  [place] State Law requires us to notify patients who have findings on imaging exams that may require more testing or follow-up with a health professional within the next 3 months.        How serious is the finding on the imaging test?  This letter is sent to all patients who may need follow-up or more testing within the next 3 months.   Receiving this letter does not necessarily mean you have a life-threatening imaging finding or disease.  Recommendations in the radiologist’s imaging report are general in nature and it is up to your healthcare provider to say whether those recommendations make sense for your situation.  You are strongly encouraged to talk to your health care provider about the results and ask whether additional steps need to be taken.    Where can I get a copy of the final report for my recent radiology exam?  To get a full copy of the report you can access your records online at [email or web address] or please contact [facility]’s Medical Records Department at [phone] Monday through Friday between 8 am and 6 pm.         What do I need to do now?           Please contact your health care provider who requested the imaging study to discuss what further actions (if any) are needed.  You may have already heard from (your ordering provider) in regard to this test in which case you can disregard this letter.        NOTICE IN ACCORDANCE WITH THE [place] STATE “PATIENT TEST RESULT INFORMATION ACT OF 2018”    You are receiving this notice as a result of a determination by your diagnostic imaging service that further discussions of your test results are warranted and would be beneficial to you.    The complete results of your test or tests have been or will be sent to the health care practitioner that ordered the test or tests. It is recommended that you contact your health care practitioner to discuss your results as soon as possible.

## 2024-07-23 ENCOUNTER — TELEPHONE (OUTPATIENT)
Age: 74
End: 2024-07-23

## 2024-07-23 ENCOUNTER — HOME CARE VISIT (OUTPATIENT)
Dept: HOME HEALTH SERVICES | Facility: HOME HEALTHCARE | Age: 74
End: 2024-07-23
Payer: MEDICARE

## 2024-07-23 ENCOUNTER — APPOINTMENT (OUTPATIENT)
Dept: LAB | Facility: MEDICAL CENTER | Age: 74
End: 2024-07-23
Payer: MEDICARE

## 2024-07-23 ENCOUNTER — TELEPHONE (OUTPATIENT)
Dept: INFECTIOUS DISEASES | Facility: CLINIC | Age: 74
End: 2024-07-23

## 2024-07-23 VITALS
HEART RATE: 86 BPM | DIASTOLIC BLOOD PRESSURE: 64 MMHG | OXYGEN SATURATION: 95 % | TEMPERATURE: 98.3 F | SYSTOLIC BLOOD PRESSURE: 120 MMHG | RESPIRATION RATE: 18 BRPM

## 2024-07-23 DIAGNOSIS — M46.26 OSTEOMYELITIS OF VERTEBRA OF LUMBAR REGION (HCC): ICD-10-CM

## 2024-07-23 LAB
ALBUMIN SERPL BCG-MCNC: 3.7 G/DL (ref 3.5–5)
ALP SERPL-CCNC: 42 U/L (ref 34–104)
ALT SERPL W P-5'-P-CCNC: 12 U/L (ref 7–52)
ANION GAP SERPL CALCULATED.3IONS-SCNC: 12 MMOL/L (ref 4–13)
AST SERPL W P-5'-P-CCNC: 16 U/L (ref 13–39)
BASOPHILS # BLD AUTO: 0.02 THOUSANDS/ÂΜL (ref 0–0.1)
BASOPHILS NFR BLD AUTO: 0 % (ref 0–1)
BILIRUB SERPL-MCNC: 0.38 MG/DL (ref 0.2–1)
BUN SERPL-MCNC: 21 MG/DL (ref 5–25)
CALCIUM SERPL-MCNC: 9.6 MG/DL (ref 8.4–10.2)
CHLORIDE SERPL-SCNC: 96 MMOL/L (ref 96–108)
CO2 SERPL-SCNC: 29 MMOL/L (ref 21–32)
CREAT SERPL-MCNC: 0.97 MG/DL (ref 0.6–1.3)
CRP SERPL QL: 2.9 MG/L
EOSINOPHIL # BLD AUTO: 0.1 THOUSAND/ÂΜL (ref 0–0.61)
EOSINOPHIL NFR BLD AUTO: 2 % (ref 0–6)
ERYTHROCYTE [DISTWIDTH] IN BLOOD BY AUTOMATED COUNT: 14.2 % (ref 11.6–15.1)
ERYTHROCYTE [SEDIMENTATION RATE] IN BLOOD: 54 MM/HOUR (ref 0–19)
GFR SERPL CREATININE-BSD FRML MDRD: 76 ML/MIN/1.73SQ M
GLUCOSE SERPL-MCNC: 144 MG/DL (ref 65–140)
HCT VFR BLD AUTO: 40.3 % (ref 36.5–49.3)
HGB BLD-MCNC: 13.4 G/DL (ref 12–17)
IMM GRANULOCYTES # BLD AUTO: 0.02 THOUSAND/UL (ref 0–0.2)
IMM GRANULOCYTES NFR BLD AUTO: 0 % (ref 0–2)
LYMPHOCYTES # BLD AUTO: 1.44 THOUSANDS/ÂΜL (ref 0.6–4.47)
LYMPHOCYTES NFR BLD AUTO: 30 % (ref 14–44)
MCH RBC QN AUTO: 30.4 PG (ref 26.8–34.3)
MCHC RBC AUTO-ENTMCNC: 33.3 G/DL (ref 31.4–37.4)
MCV RBC AUTO: 91 FL (ref 82–98)
MONOCYTES # BLD AUTO: 0.47 THOUSAND/ÂΜL (ref 0.17–1.22)
MONOCYTES NFR BLD AUTO: 10 % (ref 4–12)
NEUTROPHILS # BLD AUTO: 2.83 THOUSANDS/ÂΜL (ref 1.85–7.62)
NEUTS SEG NFR BLD AUTO: 58 % (ref 43–75)
NRBC BLD AUTO-RTO: 0 /100 WBCS
PLATELET # BLD AUTO: 233 THOUSANDS/UL (ref 149–390)
PMV BLD AUTO: 10 FL (ref 8.9–12.7)
POTASSIUM SERPL-SCNC: 3.3 MMOL/L (ref 3.5–5.3)
PROT SERPL-MCNC: 7.2 G/DL (ref 6.4–8.4)
RBC # BLD AUTO: 4.41 MILLION/UL (ref 3.88–5.62)
SODIUM SERPL-SCNC: 137 MMOL/L (ref 135–147)
WBC # BLD AUTO: 4.88 THOUSAND/UL (ref 4.31–10.16)

## 2024-07-23 PROCEDURE — 85025 COMPLETE CBC W/AUTO DIFF WBC: CPT

## 2024-07-23 PROCEDURE — G0299 HHS/HOSPICE OF RN EA 15 MIN: HCPCS

## 2024-07-23 PROCEDURE — 36415 COLL VENOUS BLD VENIPUNCTURE: CPT

## 2024-07-23 PROCEDURE — 86140 C-REACTIVE PROTEIN: CPT

## 2024-07-23 PROCEDURE — 85652 RBC SED RATE AUTOMATED: CPT

## 2024-07-23 PROCEDURE — 80053 COMPREHEN METABOLIC PANEL: CPT

## 2024-07-23 NOTE — TELEPHONE ENCOUNTER
Pts wife called in to ask about appt on 7/26 she was under the impression that the appt was virtual. I made her aware that the visit was being worked on currently and that someone would call her back with more info.    Thank you.

## 2024-07-23 NOTE — TELEPHONE ENCOUNTER
07/23/2024- CALLED AND SPOKE WITH PT'S WIFE ABOUT SCHEDULING A SNPX APT. PT'S WIFE STATED THAT 2:30 IS AN OK TIME AND ASKED IF IT COULD BE VIRTUAL. PT'S WIFE WAS INFORMED THE SNPX APT COULD NOT BE VIRTUAL.

## 2024-07-23 NOTE — TELEPHONE ENCOUNTER
07/23/2024- MICHAELLE CALLED REGARDING THE 07/22/2024 MRI LUMBAR AND THE MESSAGE FROM DEBORA RESTREPO MD WHICH STATES Please contact neurosurgery office ASAP with these results.  Patient remains on IV antibiotics and unfortunately continues with a significant degree of back discomfort and essentially has already completed 6 weeks of therapy but still has persistent abscess.  I am looking for guidance as to next steps in terms of either drainage or if surgical intervention is needed.  Thanks. MICHAELLE IS REQUESTING A NURSE REACH OUT TO THE PROVIDER DIRECTLY THROUGH [employer] OR HER DIRECT NUMBER [phone] TO SPEAK WITH HER ABOUT THESE CONCERNS.

## 2024-07-23 NOTE — TELEPHONE ENCOUNTER
----- Message from Pili Cuevas MD sent at 7/23/2024  7:41 AM EDT -----  Please contact neurosurgery office ASAP with these results.  Patient remains on IV antibiotics and unfortunately continues with a significant degree of back discomfort and essentially has already completed 6 weeks of therapy but still has persistent abscess.  I am looking for guidance as to next steps in terms of either drainage or if surgical intervention is needed.  Thanks.

## 2024-07-23 NOTE — TELEPHONE ENCOUNTER
Spoke with Deacon at Neurosurgery to report this MRI result and provide this message to the doctor covering patient. They will have their provider reach directly out to Dr. Cuevas regarding patient.

## 2024-07-24 NOTE — TELEPHONE ENCOUNTER
Pt  called  asking for virtual appt on 7/26 , advised the doctor needs to see him in office to discuss his treatments (previosulsy discussed w/ wife), pt concerned he would be asked to sign consents at this time, advised pt that we do face to face conversations for possible treatments and never ask pt to sign a consent form unless they wish to consent to a particular and specifc procedure. Pt states he will keep his appointment unless he has to much pain in his PIC line to get here.

## 2024-07-26 ENCOUNTER — TELEPHONE (OUTPATIENT)
Dept: NEUROSURGERY | Facility: CLINIC | Age: 74
End: 2024-07-26

## 2024-07-26 ENCOUNTER — TELEPHONE (OUTPATIENT)
Age: 74
End: 2024-07-26

## 2024-07-26 NOTE — TELEPHONE ENCOUNTER
RECEIEVED INBOX THAT PT FLORENTIN HIS APT FOR TODAY BY HERBER LERMA APT I CALLED PT JADE TO CALL US BACK TO RESCHEULE APT -BA

## 2024-07-26 NOTE — TELEPHONE ENCOUNTER
PT CALLED TO CANCEL SNPX APPT FOR TODAY 7/26. INFORMED PT WE WOULD BE REACHING OUT TO RESCHED.    PLEASE CALL PT TO RESCHED SNPX    THANK YOU

## 2024-07-26 NOTE — TELEPHONE ENCOUNTER
Pt called in. He is unable to r/s appt at this time. He is having oral surgery on 8/5 and does not know how he will be feeling. He is going to call us back to r/s this appt when he's feeling better.

## 2024-07-29 ENCOUNTER — HOME CARE VISIT (OUTPATIENT)
Dept: HOME HEALTH SERVICES | Facility: HOME HEALTHCARE | Age: 74
End: 2024-07-29
Payer: MEDICARE

## 2024-07-29 ENCOUNTER — LAB REQUISITION (OUTPATIENT)
Dept: LAB | Facility: HOSPITAL | Age: 74
End: 2024-07-29
Payer: MEDICARE

## 2024-07-29 VITALS
OXYGEN SATURATION: 96 % | HEART RATE: 68 BPM | SYSTOLIC BLOOD PRESSURE: 110 MMHG | TEMPERATURE: 97.2 F | RESPIRATION RATE: 20 BRPM | DIASTOLIC BLOOD PRESSURE: 64 MMHG

## 2024-07-29 DIAGNOSIS — R78.81 BACTEREMIA: ICD-10-CM

## 2024-07-29 DIAGNOSIS — M46.26 OSTEOMYELITIS OF VERTEBRA, LUMBAR REGION (HCC): ICD-10-CM

## 2024-07-29 LAB
ALBUMIN SERPL BCG-MCNC: 3.8 G/DL (ref 3.5–5)
ALP SERPL-CCNC: 44 U/L (ref 34–104)
ALT SERPL W P-5'-P-CCNC: 9 U/L (ref 7–52)
ANION GAP SERPL CALCULATED.3IONS-SCNC: 10 MMOL/L (ref 4–13)
AST SERPL W P-5'-P-CCNC: 17 U/L (ref 13–39)
BASOPHILS # BLD AUTO: 0.02 THOUSANDS/ÂΜL (ref 0–0.1)
BASOPHILS NFR BLD AUTO: 0 % (ref 0–1)
BILIRUB SERPL-MCNC: 0.27 MG/DL (ref 0.2–1)
BUN SERPL-MCNC: 21 MG/DL (ref 5–25)
CALCIUM SERPL-MCNC: 9.3 MG/DL (ref 8.4–10.2)
CHLORIDE SERPL-SCNC: 98 MMOL/L (ref 96–108)
CO2 SERPL-SCNC: 29 MMOL/L (ref 21–32)
CREAT SERPL-MCNC: 1.01 MG/DL (ref 0.6–1.3)
CRP SERPL QL: 1.1 MG/L
EOSINOPHIL # BLD AUTO: 0.13 THOUSAND/ÂΜL (ref 0–0.61)
EOSINOPHIL NFR BLD AUTO: 2 % (ref 0–6)
ERYTHROCYTE [DISTWIDTH] IN BLOOD BY AUTOMATED COUNT: 14.3 % (ref 11.6–15.1)
ERYTHROCYTE [SEDIMENTATION RATE] IN BLOOD: 50 MM/HOUR (ref 0–19)
GFR SERPL CREATININE-BSD FRML MDRD: 72 ML/MIN/1.73SQ M
GLUCOSE SERPL-MCNC: 137 MG/DL (ref 65–140)
HCT VFR BLD AUTO: 40.8 % (ref 36.5–49.3)
HGB BLD-MCNC: 13.4 G/DL (ref 12–17)
IMM GRANULOCYTES # BLD AUTO: 0.01 THOUSAND/UL (ref 0–0.2)
IMM GRANULOCYTES NFR BLD AUTO: 0 % (ref 0–2)
LYMPHOCYTES # BLD AUTO: 1.49 THOUSANDS/ÂΜL (ref 0.6–4.47)
LYMPHOCYTES NFR BLD AUTO: 28 % (ref 14–44)
MCH RBC QN AUTO: 30.2 PG (ref 26.8–34.3)
MCHC RBC AUTO-ENTMCNC: 32.8 G/DL (ref 31.4–37.4)
MCV RBC AUTO: 92 FL (ref 82–98)
MONOCYTES # BLD AUTO: 0.43 THOUSAND/ÂΜL (ref 0.17–1.22)
MONOCYTES NFR BLD AUTO: 8 % (ref 4–12)
NEUTROPHILS # BLD AUTO: 3.24 THOUSANDS/ÂΜL (ref 1.85–7.62)
NEUTS SEG NFR BLD AUTO: 62 % (ref 43–75)
NRBC BLD AUTO-RTO: 0 /100 WBCS
PLATELET # BLD AUTO: 205 THOUSANDS/UL (ref 149–390)
PMV BLD AUTO: 9.9 FL (ref 8.9–12.7)
POTASSIUM SERPL-SCNC: 4 MMOL/L (ref 3.5–5.3)
PROT SERPL-MCNC: 6.5 G/DL (ref 6.4–8.4)
RBC # BLD AUTO: 4.43 MILLION/UL (ref 3.88–5.62)
SODIUM SERPL-SCNC: 137 MMOL/L (ref 135–147)
WBC # BLD AUTO: 5.32 THOUSAND/UL (ref 4.31–10.16)

## 2024-07-29 PROCEDURE — 85652 RBC SED RATE AUTOMATED: CPT | Performed by: INTERNAL MEDICINE

## 2024-07-29 PROCEDURE — 80053 COMPREHEN METABOLIC PANEL: CPT | Performed by: INTERNAL MEDICINE

## 2024-07-29 PROCEDURE — 86140 C-REACTIVE PROTEIN: CPT | Performed by: INTERNAL MEDICINE

## 2024-07-29 PROCEDURE — 85025 COMPLETE CBC W/AUTO DIFF WBC: CPT | Performed by: INTERNAL MEDICINE

## 2024-07-29 PROCEDURE — G0299 HHS/HOSPICE OF RN EA 15 MIN: HCPCS

## 2024-07-31 ENCOUNTER — TELEPHONE (OUTPATIENT)
Age: 74
End: 2024-07-31

## 2024-07-31 ENCOUNTER — HOME CARE VISIT (OUTPATIENT)
Dept: HOME HEALTH SERVICES | Facility: HOME HEALTHCARE | Age: 74
End: 2024-07-31
Payer: MEDICARE

## 2024-07-31 ENCOUNTER — TELEPHONE (OUTPATIENT)
Dept: OTHER | Facility: HOSPITAL | Age: 74
End: 2024-07-31

## 2024-07-31 ENCOUNTER — TELEMEDICINE (OUTPATIENT)
Dept: INFECTIOUS DISEASES | Facility: CLINIC | Age: 74
End: 2024-07-31
Payer: MEDICARE

## 2024-07-31 ENCOUNTER — TELEPHONE (OUTPATIENT)
Dept: INFECTIOUS DISEASES | Facility: CLINIC | Age: 74
End: 2024-07-31

## 2024-07-31 DIAGNOSIS — G06.2 EPIDURAL ABSCESS: ICD-10-CM

## 2024-07-31 DIAGNOSIS — R78.81 BACTEREMIA: Primary | ICD-10-CM

## 2024-07-31 DIAGNOSIS — Z79.4 TYPE 2 DIABETES MELLITUS WITH OTHER SPECIFIED COMPLICATION, WITH LONG-TERM CURRENT USE OF INSULIN (HCC): ICD-10-CM

## 2024-07-31 DIAGNOSIS — E11.69 TYPE 2 DIABETES MELLITUS WITH OTHER SPECIFIED COMPLICATION, WITH LONG-TERM CURRENT USE OF INSULIN (HCC): ICD-10-CM

## 2024-07-31 DIAGNOSIS — M46.26 OSTEOMYELITIS OF LUMBAR SPINE (HCC): ICD-10-CM

## 2024-07-31 PROCEDURE — 99214 OFFICE O/P EST MOD 30 MIN: CPT | Performed by: PHYSICIAN ASSISTANT

## 2024-07-31 RX ORDER — METRONIDAZOLE 500 MG/1
500 TABLET ORAL EVERY 8 HOURS SCHEDULED
Qty: 126 TABLET | Refills: 0 | Status: SHIPPED | OUTPATIENT
Start: 2024-07-31 | End: 2024-09-11

## 2024-07-31 NOTE — PATIENT INSTRUCTIONS
- continue ceftriaxone IV and flagyl po as ordered for an additional 6 weeks  - refill for flagyl sent to your pharmacy today  - Home IV infusion company notified of extension of IV antibiotic  - continue weekly labs  - Dental extraction as planned on 8/5  - please make Neurosurgery appt   - RTO 2 weeks - can be virtual  - please call with any concerns

## 2024-07-31 NOTE — PROGRESS NOTES
Ambulatory Visit  Name: Capo Pepper      : 1950      MRN: [id number]  Encounter Provider: Peter Arthur PA-C  Encounter Date: 2024   Encounter department: [facility]    Assessment & Plan   1. Bacteremia  Assessment & Plan:  bacteroides bacteremia complicated by osteomyelitis of L2-3 with epidural abscess and myositis.  Source thought to be dental vs GI.  Patient with multiple dental issues seen on CT face.  No h/o colonoscopy.  MRI with OM at L2-3 with epidural abscess and myositis.  No plans for intervention per neurosurgery.  IR biopsy deferred given concern for poor yield that may not outweigh risk of procedure on discussion with IR attending.   Patient eventually discharged to complete a course of IV ceftriaxone and po flagyl.     Plan  - patient has completed treatment for bacteremia  - he remains on antibiotics for L2-3 OM with abscess as below  - dental extraction scheduled for 24  - recommend colonoscopy  Orders:  -     metroNIDAZOLE (FLAGYL) 500 mg tablet; Take 1 tablet (500 mg total) by mouth every 8 (eight) hours  2. Osteomyelitis of lumbar spine (HCC)  Assessment & Plan:  bacteroides bacteremia complicated by osteomyelitis of L2-3 with epidural abscess and myositis.    MRI with OM at L2-3 with epidural abscess and myositis.  No plans for intervention per neurosurgery.  IR biopsy deferred given concern for poor yield that may not outweigh risk of procedure on discussion with IR attending.   Patient eventually discharged to complete a course of IV ceftriaxone and po flagyl.     Patient remains on IV ceftriaxone and po flagyl.  His antibiotic course was already extended as his last visit until MRI could be obtained.  MRI still with abscess.  Awaiting patient to see Neurosurgery for further recommendations including guidance on timing for next MRI.  Due to persistent abscess on MRI it is recommended that patient remain on IV ceftriaxone and po flagyl and  we extend for an additional 6 weeks.  Long discussion with patient and his wife due to the financial concerns of treatment.  Outlined to patient our recommendation is IV ceftriaxone and po flagyl and this could be done at home ($1600 quoted by [facility]), he could go to the Infusion Center daily for his IV ceftriaxone at no cost or SNF to get the IV ceftriaxone.  Patient adamantly refused SNF.  He does not want to go to the infusion center daily.  The infusion company can set up a payment plan for the IV antibiotic and he wished to go that route.  There was also an option given for oral treatment with po augmentin but it was told to the patient that our recommendation was really IV but due to financial concerns if he could not or would not do IV this would be the alternative recommendation and we could not guarantee success.      Also had discussion with patient that it is important to have his teeth extracted osiris if this is the cause of the infection.  Stressed he should keep his appt on 8/5/24.  It is just as important that he see Neurosurgery and this should be done in person.  Explained to patient why Televisit deferred by Neurosurgery and they will likely want to do a good Neuro exam on him which they cannot do virtually.  Asked patient to call and make an appt with Neurosurgery today.     Plan  - extend ceftriaxone 2 grams IV daily and flagyl 500 mg po tid for an additional 6 weeks through 9/11/24 but ultimately treatment course will be determined based on Neurosurgery input as well as MRI results (epidural abscess resolution) and ESR/CRP.   - may consider po augmentin after completion of IV ceftriaxone based on inflammatory markers  - continue weekly CBCD, CMP, ESR , CRP while on IV antibiotics  - Dental extractions on 8/5 as scheduled  - Neurosurgery evaluation ASAP - would like recommendations on treatment plan as well as timing of next imaging  - RTO 2 weeks - can be virtual   Orders:  -     metroNIDAZOLE  (FLAGYL) 500 mg tablet; Take 1 tablet (500 mg total) by mouth every 8 (eight) hours  3. Type 2 diabetes mellitus with other specified complication, with long-term current use of insulin ([facility])  Assessment & Plan:    Lab Results   Component Value Date    HGBA1C 9.4 (H) 06/01/2024         History of Present Illness     Capo Pepper is a 74 y.o. male who presents for virtual follow up today regarding L2-3 OM with epidural abscess and myositis.  Patient seen last approximately 3 weeks ago.  At that time MRI was reordered and antibiotics were extended.  He remains on IV ceftriaxone and po flagyl.  He continues to tolerate the antibiotics but state that the flagyl does upset his stomach at times.  He gets some nausea, no vomiting or diarrhea.  Patient states overall his back is better than it was but when he goes in the car he has increased pain for a few days.  He was scheduled for a Neurosurgery appt but cancelled due to back pain.  He is now scheduled for dental extractions on 8/5.  His PICC continue to work without issues.     Review of Systems   Constitutional:  Negative for chills and fever.   Respiratory:  Negative for cough and shortness of breath.    Gastrointestinal:  Negative for abdominal pain, diarrhea, nausea and vomiting.   Musculoskeletal:  Positive for back pain.   Skin:  Negative for rash.   Psychiatric/Behavioral:  Negative for behavioral problems and confusion.        Objective     There were no vitals taken for this visit.    Physical Exam  Constitutional:       General: He is not in acute distress.     Appearance: Normal appearance. He is not ill-appearing, toxic-appearing or diaphoretic.   HENT:      Head: Normocephalic and atraumatic.   Eyes:      General: No scleral icterus.        Right eye: No discharge.         Left eye: No discharge.      Conjunctiva/sclera: Conjunctivae normal.   Pulmonary:      Effort: Pulmonary effort is normal. No respiratory distress.   Skin:     Coloration: Skin is  not jaundiced or pale.      Findings: No erythema or rash.      Comments: PICC insertion site without erythema or drainage   Neurological:      Mental Status: He is alert and oriented to person, place, and time.   Psychiatric:         Mood and Affect: Mood normal.         Behavior: Behavior normal.       Administrative Statements         Labs:   7/29/24  Wbc: 5.32  Hgb: 13.4  Plt: 205  Cr: 1.01  LFTs: WNL  CRP: 1.1  ESR: 50    MRI 7/22/24  MRI LUMBAR SPINE WITH AND WITHOUT CONTRAST     INDICATION: M46.26: Osteomyelitis of vertebra, lumbar region  R78.81: Bacteremia.     COMPARISON: MRI lumbar spine 6/4/2024.     TECHNIQUE:  Multiplanar, multisequence imaging of the lumbar spine was performed before and after gadolinium administration. .        IV Contrast:  8 mL of Gadobutrol injection (SINGLE-DOSE)     IMAGE QUALITY: Motion-degraded.     FINDINGS:     VERTEBRAL BODIES: Mild retrolisthesis of L5 on S1. No acute compression fracture.     Findings in keeping with L2-L3 discitis osteomyelitis, with progressive marrow abnormalities in the L2 and L3 vertebral bodies, including enhancement and edema. Furthermore, there is progressive loss of disc height at L2-L3. Adjacent paravertebral signal   abnormality and enhancement within the musculature is in keeping with adjacent infectious/inflammatory change without evidence of a definite organized collection.     There is a ventral epidural abscess that spans L1-L4 though is decreased in anteroposterior dimension from the prior study. There is mild resultant spinal canal stenosis at these levels as a result.     There is extension of infectious/inflammatory change and enhancement into the left greater than right L2-L3 and L3-L4 neural foramen, which show marked neuroforaminal narrowing.     SACRUM: No acute abnormality.     DISTAL CORD AND CONUS:  Normal size and signal within the distal cord and conus.     PARASPINAL SOFT TISSUES: As above.     LOWER THORACIC DISC SPACES:  Spondylotic changes without acute critical central canal stenosis.     LUMBAR DISC SPACES: Apart from the findings at L2-L3, there is multilevel degenerative disc disease, greatest and marked at L5-S1.     L1-L2: Disc bulge. Mild facet arthropathy. No significant neuroforaminal narrowing or spinal canal stenosis.     L2-L3: Discussed above. Mild-moderate facet arthropathy. Mild to moderate left and marked right neuroforaminal narrowing. Mild spinal canal stenosis.     L3-L4: Discussed above. Disc bulge. Mild facet arthropathy. Mild right and marked left neuroforaminal narrowing. Mild spinal canal stenosis.     L4-L5: Disc bulge. Moderate facet arthropathy. No significant neuroforaminal narrowing or spinal canal stenosis.     L5-S1: Disc bulge. Moderate facet arthropathy. Mild-moderate bilateral neuroforaminal narrowing.     POSTCONTRAST IMAGING: As above.     OTHER FINDINGS: Scattered T2 hyperintensities in the kidneys, statistically cysts.     IMPRESSION:     1. Findings in keeping with discitis osteomyelitis at L2-L3, with progressive marrow signal changes and enhancement as well as progressive loss of disc height at L2-L3. There is extension into the paravertebral soft tissues, as detailed above. A ventral   epidural collection spans L1-L4 though is decreased in enrique-posterior dimension from the prior and results in mild associated spinal canal stenosis. Additionally, there is increased extension into the L2-L3 and L3-L4 neural foramen, left greater than   right, with associated marked neuroforaminal narrowing on the left at these levels, detailed above.     2. Multilevel lumbar spine spondylotic changes. See narrative above for full details.     The study was marked in EPIC for immediate notification.       Telemedicine consent    Patient: Capo Pepper  Provider: Peter Arthur PA-C  Provider located at [facility]  [address] [place] [address]  [id number]  [phone]    The patient was identified by name and date of birth. Capo Pepper was informed that this is a telemedicine visit and that the visit is being conducted through the [employer] platform. He agrees to proceed..  My office door was closed. No one else was in the room.  He acknowledged consent and understanding of privacy and security of the video platform. The patient has agreed to participate and understands they can discontinue the visit at any time.    Patient is aware this is a billable service.     I spent 30 minutes with the patient during this visit. Additional time spent on chart/lab review, coordination of care, documentation and order placement.  Case was discussed with Dr Cuevas as well as [facility] to coordinate the extension of IV antibiotics.

## 2024-07-31 NOTE — TELEPHONE ENCOUNTER
I called the patient to check on him after I discussed with Dr Cuevas  ( ID). Dr Cuevas is concerned about MRI report, where there is still collections at L2-3 despite 6 weeks Abx. I discussed with Dr Hines. He says MRI usually lags behind Abx course. Therefore better to continue monitoring as patient's back pain is better than prior to starting Abx, with obviously improved inflammatory markers, consider repeat MRI in 4 weeks if patient remains stable and is not showing worsening symptoms. I will patient in 2 weeks with upright Lumbar spine X-rays.

## 2024-07-31 NOTE — TELEPHONE ENCOUNTER
Valerie From [facility] called and would like a call back to Dr Cuevas regarding the ASAP message sent by Dr Cuevas on 7/23/24 Dr Cuevas cell phone is .Pt canceled appt with us on 7/26/24 but she wants to follow up with us.

## 2024-07-31 NOTE — TELEPHONE ENCOUNTER
Spoke with Evon at Neurosurgery to request patient be scheduled for visit. In addition, to request Dr. Goldberg contact Dr. Cuevas to speak directly with regard to this patient's care plan. At this time we are extending an additional 6 weeks (through 9/11). She is going to notify the doctor and provide Dr. Delgadillo direct number to contact.    Spoke with Mayo at neurosurgery office.  Scheduled patient for follow up with a spine surgeon in their office. They can follow up on 8/15 0945 in the [place] office. With Pramod De La Fuente PA-C and Dr. Cobb. Mayo is going to route a message to Pramod to call Dr. Cuevas.

## 2024-07-31 NOTE — ASSESSMENT & PLAN NOTE
bacteroides bacteremia complicated by osteomyelitis of L2-3 with epidural abscess and myositis.  Source thought to be dental vs GI.  Patient with multiple dental issues seen on CT face.  No h/o colonoscopy.  MRI with OM at L2-3 with epidural abscess and myositis.  No plans for intervention per neurosurgery.  IR biopsy deferred given concern for poor yield that may not outweigh risk of procedure on discussion with IR attending.   Patient eventually discharged to complete a course of IV ceftriaxone and po flagyl.     Plan  - patient has completed treatment for bacteremia  - he remains on antibiotics for L2-3 OM with abscess as below  - dental extraction scheduled for 8/5/24  - recommend colonoscopy

## 2024-07-31 NOTE — TELEPHONE ENCOUNTER
Contacted patient (wife) to inform them of f/u with Neurosurgery and schedule virtual follow up with us. I made Marium aware, she confirms that she will bring patient in person and understands that we can do our f/u in two weeks as virtual. She will reach out to us if questions.

## 2024-07-31 NOTE — ASSESSMENT & PLAN NOTE
bacteroides bacteremia complicated by osteomyelitis of L2-3 with epidural abscess and myositis.    MRI with OM at L2-3 with epidural abscess and myositis.  No plans for intervention per neurosurgery.  IR biopsy deferred given concern for poor yield that may not outweigh risk of procedure on discussion with IR attending.   Patient eventually discharged to complete a course of IV ceftriaxone and po flagyl.     Patient remains on IV ceftriaxone and po flagyl.  His antibiotic course was already extended as his last visit until MRI could be obtained.  MRI still with abscess.  Awaiting patient to see Neurosurgery for further recommendations including guidance on timing for next MRI.  Due to persistent abscess on MRI it is recommended that patient remain on IV ceftriaxone and po flagyl and we extend for an additional 6 weeks.  Long discussion with patient and his wife due to the financial concerns of treatment.  Outlined to patient our recommendation is IV ceftriaxone and po flagyl and this could be done at home ($1600 quoted by [facility]), he could go to the [facility]fusion Center daily for his IV ceftriaxone at no cost or SNF to get the IV ceftriaxone.  Patient adamantly refused SNF.  He does not want to go to the infusion center daily.  The infusion company can set up a payment plan for the IV antibiotic and he wished to go that route.  There was also an option given for oral treatment with po augmentin but it was told to the patient that our recommendation was really IV but due to financial concerns if he could not or would not do IV this would be the alternative recommendation and we could not guarantee success.      Also had discussion with patient that it is important to have his teeth extracted osiris if this is the cause of the infection.  Stressed he should keep his appt on 8/5/24.  It is just as important that he see Neurosurgery and this should be done in person.  Explained to patient why Televisit deferred by  Neurosurgery and they will likely want to do a good Neuro exam on him which they cannot do virtually.  Asked patient to call and make an appt with Neurosurgery today.     Plan  - extend ceftriaxone 2 grams IV daily and flagyl 500 mg po tid for an additional 6 weeks through 9/11/24 but ultimately treatment course will be determined based on Neurosurgery input as well as MRI results (epidural abscess resolution) and ESR/CRP.   - may consider po augmentin after completion of IV ceftriaxone based on inflammatory markers  - continue weekly CBCD, CMP, ESR , CRP while on IV antibiotics  - Dental extractions on 8/5 as scheduled  - Neurosurgery evaluation ASAP - would like recommendations on treatment plan as well as timing of next imaging  - RTO 2 weeks - can be virtual

## 2024-08-06 ENCOUNTER — HOME CARE VISIT (OUTPATIENT)
Dept: HOME HEALTH SERVICES | Facility: HOME HEALTHCARE | Age: 74
End: 2024-08-06
Payer: MEDICARE

## 2024-08-06 ENCOUNTER — APPOINTMENT (OUTPATIENT)
Dept: LAB | Facility: CLINIC | Age: 74
End: 2024-08-06
Payer: MEDICARE

## 2024-08-06 VITALS
SYSTOLIC BLOOD PRESSURE: 122 MMHG | DIASTOLIC BLOOD PRESSURE: 76 MMHG | TEMPERATURE: 98.2 F | HEART RATE: 74 BPM | OXYGEN SATURATION: 95 % | RESPIRATION RATE: 16 BRPM

## 2024-08-06 DIAGNOSIS — R78.81 BACTEREMIA: ICD-10-CM

## 2024-08-06 DIAGNOSIS — G06.2 EPIDURAL ABSCESS: ICD-10-CM

## 2024-08-06 DIAGNOSIS — M46.26 OSTEOMYELITIS OF LUMBAR SPINE (HCC): ICD-10-CM

## 2024-08-06 LAB
ALBUMIN SERPL BCG-MCNC: 3.8 G/DL (ref 3.5–5)
ALP SERPL-CCNC: 40 U/L (ref 34–104)
ALT SERPL W P-5'-P-CCNC: 11 U/L (ref 7–52)
ANION GAP SERPL CALCULATED.3IONS-SCNC: 12 MMOL/L (ref 4–13)
AST SERPL W P-5'-P-CCNC: 16 U/L (ref 13–39)
BASOPHILS # BLD AUTO: 0.04 THOUSANDS/ÂΜL (ref 0–0.1)
BASOPHILS NFR BLD AUTO: 1 % (ref 0–1)
BILIRUB SERPL-MCNC: 0.38 MG/DL (ref 0.2–1)
BUN SERPL-MCNC: 27 MG/DL (ref 5–25)
CALCIUM SERPL-MCNC: 9.2 MG/DL (ref 8.4–10.2)
CHLORIDE SERPL-SCNC: 98 MMOL/L (ref 96–108)
CO2 SERPL-SCNC: 26 MMOL/L (ref 21–32)
CREAT SERPL-MCNC: 1 MG/DL (ref 0.6–1.3)
CRP SERPL QL: 1.2 MG/L
EOSINOPHIL # BLD AUTO: 0.09 THOUSAND/ÂΜL (ref 0–0.61)
EOSINOPHIL NFR BLD AUTO: 2 % (ref 0–6)
ERYTHROCYTE [DISTWIDTH] IN BLOOD BY AUTOMATED COUNT: 14.4 % (ref 11.6–15.1)
ERYTHROCYTE [SEDIMENTATION RATE] IN BLOOD: 33 MM/HOUR (ref 0–19)
GFR SERPL CREATININE-BSD FRML MDRD: 73 ML/MIN/1.73SQ M
GLUCOSE P FAST SERPL-MCNC: 123 MG/DL (ref 65–99)
HCT VFR BLD AUTO: 40.2 % (ref 36.5–49.3)
HGB BLD-MCNC: 13.4 G/DL (ref 12–17)
IMM GRANULOCYTES # BLD AUTO: 0.02 THOUSAND/UL (ref 0–0.2)
IMM GRANULOCYTES NFR BLD AUTO: 0 % (ref 0–2)
LYMPHOCYTES # BLD AUTO: 1.48 THOUSANDS/ÂΜL (ref 0.6–4.47)
LYMPHOCYTES NFR BLD AUTO: 27 % (ref 14–44)
MCH RBC QN AUTO: 30.3 PG (ref 26.8–34.3)
MCHC RBC AUTO-ENTMCNC: 33.3 G/DL (ref 31.4–37.4)
MCV RBC AUTO: 91 FL (ref 82–98)
MONOCYTES # BLD AUTO: 0.55 THOUSAND/ÂΜL (ref 0.17–1.22)
MONOCYTES NFR BLD AUTO: 10 % (ref 4–12)
NEUTROPHILS # BLD AUTO: 3.39 THOUSANDS/ÂΜL (ref 1.85–7.62)
NEUTS SEG NFR BLD AUTO: 60 % (ref 43–75)
NRBC BLD AUTO-RTO: 0 /100 WBCS
PLATELET # BLD AUTO: 204 THOUSANDS/UL (ref 149–390)
PMV BLD AUTO: 10.8 FL (ref 8.9–12.7)
POTASSIUM SERPL-SCNC: 3.7 MMOL/L (ref 3.5–5.3)
PROT SERPL-MCNC: 6.8 G/DL (ref 6.4–8.4)
RBC # BLD AUTO: 4.42 MILLION/UL (ref 3.88–5.62)
SODIUM SERPL-SCNC: 136 MMOL/L (ref 135–147)
WBC # BLD AUTO: 5.57 THOUSAND/UL (ref 4.31–10.16)

## 2024-08-06 PROCEDURE — 85025 COMPLETE CBC W/AUTO DIFF WBC: CPT

## 2024-08-06 PROCEDURE — 85652 RBC SED RATE AUTOMATED: CPT

## 2024-08-06 PROCEDURE — 86140 C-REACTIVE PROTEIN: CPT

## 2024-08-06 PROCEDURE — 36415 COLL VENOUS BLD VENIPUNCTURE: CPT

## 2024-08-06 PROCEDURE — 80053 COMPREHEN METABOLIC PANEL: CPT

## 2024-08-06 PROCEDURE — G0299 HHS/HOSPICE OF RN EA 15 MIN: HCPCS

## 2024-08-10 PROCEDURE — 400014 VN F/U

## 2024-08-12 ENCOUNTER — APPOINTMENT (OUTPATIENT)
Dept: LAB | Facility: MEDICAL CENTER | Age: 74
End: 2024-08-12
Payer: MEDICARE

## 2024-08-12 ENCOUNTER — HOME CARE VISIT (OUTPATIENT)
Dept: HOME HEALTH SERVICES | Facility: HOME HEALTHCARE | Age: 74
End: 2024-08-12
Payer: MEDICARE

## 2024-08-12 ENCOUNTER — TELEMEDICINE (OUTPATIENT)
Dept: INFECTIOUS DISEASES | Facility: CLINIC | Age: 74
End: 2024-08-12
Payer: MEDICARE

## 2024-08-12 VITALS
OXYGEN SATURATION: 94 % | DIASTOLIC BLOOD PRESSURE: 78 MMHG | SYSTOLIC BLOOD PRESSURE: 124 MMHG | TEMPERATURE: 98.1 F | RESPIRATION RATE: 18 BRPM | HEART RATE: 74 BPM

## 2024-08-12 DIAGNOSIS — M46.26 OSTEOMYELITIS OF LUMBAR SPINE (HCC): Primary | ICD-10-CM

## 2024-08-12 DIAGNOSIS — R78.81 BACTEREMIA: ICD-10-CM

## 2024-08-12 DIAGNOSIS — Z79.4 TYPE 2 DIABETES MELLITUS WITH OTHER SPECIFIED COMPLICATION, WITH LONG-TERM CURRENT USE OF INSULIN (HCC): ICD-10-CM

## 2024-08-12 DIAGNOSIS — G06.2 EPIDURAL ABSCESS: ICD-10-CM

## 2024-08-12 DIAGNOSIS — E11.69 TYPE 2 DIABETES MELLITUS WITH OTHER SPECIFIED COMPLICATION, WITH LONG-TERM CURRENT USE OF INSULIN (HCC): ICD-10-CM

## 2024-08-12 DIAGNOSIS — M46.26 OSTEOMYELITIS OF LUMBAR SPINE (HCC): ICD-10-CM

## 2024-08-12 LAB
ALBUMIN SERPL BCG-MCNC: 4.1 G/DL (ref 3.5–5)
ALP SERPL-CCNC: 44 U/L (ref 34–104)
ALT SERPL W P-5'-P-CCNC: 12 U/L (ref 7–52)
ANION GAP SERPL CALCULATED.3IONS-SCNC: 11 MMOL/L (ref 4–13)
AST SERPL W P-5'-P-CCNC: 18 U/L (ref 13–39)
BASOPHILS # BLD AUTO: 0.05 THOUSANDS/ÂΜL (ref 0–0.1)
BASOPHILS NFR BLD AUTO: 1 % (ref 0–1)
BILIRUB SERPL-MCNC: 0.3 MG/DL (ref 0.2–1)
BUN SERPL-MCNC: 33 MG/DL (ref 5–25)
CALCIUM SERPL-MCNC: 9.7 MG/DL (ref 8.4–10.2)
CHLORIDE SERPL-SCNC: 99 MMOL/L (ref 96–108)
CO2 SERPL-SCNC: 27 MMOL/L (ref 21–32)
CREAT SERPL-MCNC: 0.99 MG/DL (ref 0.6–1.3)
CRP SERPL QL: 1.4 MG/L
EOSINOPHIL # BLD AUTO: 0.13 THOUSAND/ÂΜL (ref 0–0.61)
EOSINOPHIL NFR BLD AUTO: 2 % (ref 0–6)
ERYTHROCYTE [DISTWIDTH] IN BLOOD BY AUTOMATED COUNT: 14.1 % (ref 11.6–15.1)
ERYTHROCYTE [SEDIMENTATION RATE] IN BLOOD: 44 MM/HOUR (ref 0–19)
GFR SERPL CREATININE-BSD FRML MDRD: 74 ML/MIN/1.73SQ M
GLUCOSE SERPL-MCNC: 115 MG/DL (ref 65–140)
HCT VFR BLD AUTO: 43.8 % (ref 36.5–49.3)
HGB BLD-MCNC: 14.6 G/DL (ref 12–17)
IMM GRANULOCYTES # BLD AUTO: 0.02 THOUSAND/UL (ref 0–0.2)
IMM GRANULOCYTES NFR BLD AUTO: 0 % (ref 0–2)
LYMPHOCYTES # BLD AUTO: 2.03 THOUSANDS/ÂΜL (ref 0.6–4.47)
LYMPHOCYTES NFR BLD AUTO: 27 % (ref 14–44)
MCH RBC QN AUTO: 30.3 PG (ref 26.8–34.3)
MCHC RBC AUTO-ENTMCNC: 33.3 G/DL (ref 31.4–37.4)
MCV RBC AUTO: 91 FL (ref 82–98)
MONOCYTES # BLD AUTO: 0.58 THOUSAND/ÂΜL (ref 0.17–1.22)
MONOCYTES NFR BLD AUTO: 8 % (ref 4–12)
NEUTROPHILS # BLD AUTO: 4.8 THOUSANDS/ÂΜL (ref 1.85–7.62)
NEUTS SEG NFR BLD AUTO: 62 % (ref 43–75)
NRBC BLD AUTO-RTO: 0 /100 WBCS
PLATELET # BLD AUTO: 248 THOUSANDS/UL (ref 149–390)
PMV BLD AUTO: 10.7 FL (ref 8.9–12.7)
POTASSIUM SERPL-SCNC: 3.6 MMOL/L (ref 3.5–5.3)
PROT SERPL-MCNC: 7.7 G/DL (ref 6.4–8.4)
RBC # BLD AUTO: 4.82 MILLION/UL (ref 3.88–5.62)
SODIUM SERPL-SCNC: 137 MMOL/L (ref 135–147)
WBC # BLD AUTO: 7.61 THOUSAND/UL (ref 4.31–10.16)

## 2024-08-12 PROCEDURE — 36415 COLL VENOUS BLD VENIPUNCTURE: CPT

## 2024-08-12 PROCEDURE — 86140 C-REACTIVE PROTEIN: CPT

## 2024-08-12 PROCEDURE — 85652 RBC SED RATE AUTOMATED: CPT

## 2024-08-12 PROCEDURE — G0299 HHS/HOSPICE OF RN EA 15 MIN: HCPCS

## 2024-08-12 PROCEDURE — 99214 OFFICE O/P EST MOD 30 MIN: CPT | Performed by: PHYSICIAN ASSISTANT

## 2024-08-12 PROCEDURE — 80053 COMPREHEN METABOLIC PANEL: CPT

## 2024-08-12 PROCEDURE — 85025 COMPLETE CBC W/AUTO DIFF WBC: CPT

## 2024-08-12 NOTE — PATIENT INSTRUCTIONS
- continue ceftriaxone and flagyl as ordered  - weekly labs  - follow up with Neurosurgery as scheduled  - RTO 3 weeks

## 2024-08-12 NOTE — ASSESSMENT & PLAN NOTE
Bacteroides bacteremia.  Completed treatment as above.  Again discussed with patient our recommendation for colonoscopy.

## 2024-08-12 NOTE — PROGRESS NOTES
Ambulatory Visit  Name: Capo Pepper      : 1950      MRN: [id number]  Encounter Provider: Peter Arthur PA-C  Encounter Date: 2024   Encounter department: [facility]    Assessment & Plan   1. Osteomyelitis of lumbar spine (HCC)  Assessment & Plan:  bacteroides bacteremia complicated by osteomyelitis of L2-3 with epidural abscess and myositis.    MRI with OM at L2-3 with epidural abscess and myositis.  No plans for intervention per neurosurgery.  IR biopsy deferred given concern for poor yield that may not outweigh risk of procedure on discussion with IR attending.   Patient eventually discharged to complete a course of IV ceftriaxone and po flagyl.      Patient remains on IV ceftriaxone and po flagyl.  His course was extended by 6 weeks at his last visit.  He has no new complaints today.  Labs stable.  Back pain actually improved but not resolved.  He did have one tooth extracted and will have another extracted next week.  He sees Neurosurgery later this week.       Plan  - extend ceftriaxone 2 grams IV daily and flagyl 500 mg po tid through 24 but ultimately treatment course will be determined based on Neurosurgery input as well as MRI results (epidural abscess resolution) and ESR/CRP.   - may consider po augmentin after completion of IV ceftriaxone based on inflammatory markers  - continue weekly CBCD, CMP, ESR , CRP while on IV antibiotics  - Dental extraction next week as scheduled  - Neurosurgery evaluation later this week - would like recommendations on treatment plan as well as timing of next imaging  - RTO 3 weeks - can be virtual - hopefully after MRI and if MRI looks stable we can get him over to po sooner then 24.   2. Bacteremia  Assessment & Plan:  Bacteroides bacteremia.  Completed treatment as above.  Again discussed with patient our recommendation for colonoscopy.   3. Type 2 diabetes mellitus with other specified complication, with  long-term current use of insulin ([facility])  Assessment & Plan:    Lab Results   Component Value Date    HGBA1C 9.4 (H) 06/01/2024         History of Present Illness     Capo Pepper is a 74 y.o. male who presents for virtual follow up today regarding bacteroides bacteremia complicated by osteomyelitis of L2-3 with epidural abscess and myositis. Patient remains on IV ceftriaxone and po flagyl. He has no new complaints today.  He states his back pain has improved since he was last seen.  He did have one tooth extracted last week and another to be removed next week.  No new complaints.     Review of Systems   Constitutional:  Negative for chills and fever.   Respiratory:  Negative for cough and shortness of breath.    Gastrointestinal:  Negative for abdominal pain, diarrhea, nausea and vomiting.   Musculoskeletal:  Positive for back pain.   Skin:  Negative for rash.   Psychiatric/Behavioral:  Negative for behavioral problems and confusion.        Objective     There were no vitals taken for this visit.    Physical Exam  Vitals reviewed.   Constitutional:       General: He is not in acute distress.     Appearance: Normal appearance. He is not ill-appearing, toxic-appearing or diaphoretic.   HENT:      Head: Normocephalic and atraumatic.   Eyes:      General: No scleral icterus.        Right eye: No discharge.         Left eye: No discharge.      Conjunctiva/sclera: Conjunctivae normal.   Pulmonary:      Effort: Pulmonary effort is normal. No respiratory distress.   Skin:     Coloration: Skin is not jaundiced or pale.      Findings: No erythema or rash.      Comments: PICC insertion site without erythema or drainage   Neurological:      Mental Status: He is alert and oriented to person, place, and time.   Psychiatric:         Mood and Affect: Mood normal.         Behavior: Behavior normal.       Administrative Statements         Telemedicine consent    Patient: Capo Pepper  Provider: Peter Arthur PA-C  Provider  located at [facility]  [address], [place] [id number] [phone]    The patient was identified by name and date of birth. Capo Frankgareth was informed that this is a telemedicine visit and that the visit is being conducted through the [employer] platform. He agrees to proceed..  My office door was closed. No one else was in the room.  He acknowledged consent and understanding of privacy and security of the video platform. The patient has agreed to participate and understands they can discontinue the visit at any time.    Patient is aware this is a billable service.     I spent 15 minutes with the patient during this visit. Additional time spent on chart/lab review, documentation and order placement.

## 2024-08-12 NOTE — ASSESSMENT & PLAN NOTE
bacteroides bacteremia complicated by osteomyelitis of L2-3 with epidural abscess and myositis.    MRI with OM at L2-3 with epidural abscess and myositis.  No plans for intervention per neurosurgery.  IR biopsy deferred given concern for poor yield that may not outweigh risk of procedure on discussion with IR attending.   Patient eventually discharged to complete a course of IV ceftriaxone and po flagyl.      Patient remains on IV ceftriaxone and po flagyl.  His course was extended by 6 weeks at his last visit.  He has no new complaints today.  Labs stable.  Back pain actually improved but not resolved.  He did have one tooth extracted and will have another extracted next week.  He sees Neurosurgery later this week.       Plan  - extend ceftriaxone 2 grams IV daily and flagyl 500 mg po tid through 9/11/24 but ultimately treatment course will be determined based on Neurosurgery input as well as MRI results (epidural abscess resolution) and ESR/CRP.   - may consider po augmentin after completion of IV ceftriaxone based on inflammatory markers  - continue weekly CBCD, CMP, ESR , CRP while on IV antibiotics  - Dental extraction next week as scheduled  - Neurosurgery evaluation later this week - would like recommendations on treatment plan as well as timing of next imaging  - RTO 3 weeks - can be virtual - hopefully after MRI and if MRI looks stable we can get him over to po sooner then 9/11/24.

## 2024-08-13 ENCOUNTER — HOME CARE VISIT (OUTPATIENT)
Dept: HOME HEALTH SERVICES | Facility: HOME HEALTHCARE | Age: 74
End: 2024-08-13
Payer: MEDICARE

## 2024-08-13 PROCEDURE — G0179 MD RECERTIFICATION HHA PT: HCPCS | Performed by: INTERNAL MEDICINE

## 2024-08-15 ENCOUNTER — HOME CARE VISIT (OUTPATIENT)
Dept: HOME HEALTH SERVICES | Facility: HOME HEALTHCARE | Age: 74
End: 2024-08-15
Payer: MEDICARE

## 2024-08-15 ENCOUNTER — OFFICE VISIT (OUTPATIENT)
Dept: NEUROSURGERY | Facility: CLINIC | Age: 74
End: 2024-08-15
Payer: MEDICARE

## 2024-08-15 ENCOUNTER — APPOINTMENT (OUTPATIENT)
Dept: RADIOLOGY | Facility: MEDICAL CENTER | Age: 74
End: 2024-08-15
Payer: MEDICARE

## 2024-08-15 VITALS
HEART RATE: 82 BPM | DIASTOLIC BLOOD PRESSURE: 72 MMHG | HEIGHT: 70 IN | OXYGEN SATURATION: 97 % | SYSTOLIC BLOOD PRESSURE: 135 MMHG | WEIGHT: 189 LBS | BODY MASS INDEX: 27.06 KG/M2 | TEMPERATURE: 98.3 F

## 2024-08-15 DIAGNOSIS — M46.26 OSTEOMYELITIS OF LUMBAR SPINE (HCC): Primary | ICD-10-CM

## 2024-08-15 DIAGNOSIS — M46.26 OSTEOMYELITIS OF LUMBAR SPINE (HCC): ICD-10-CM

## 2024-08-15 PROCEDURE — 99213 OFFICE O/P EST LOW 20 MIN: CPT | Performed by: PHYSICIAN ASSISTANT

## 2024-08-15 PROCEDURE — 72114 X-RAY EXAM L-S SPINE BENDING: CPT

## 2024-08-15 NOTE — PROGRESS NOTES
Neurosurgery Office Note  Capo Pepper 74 y.o. male MRN: [id number]      Assessment & Plan     Patient is a 74 yrs old pleasant woman with PMHx of CHF, CM, CAD, stroke,Moldy-hay disease, Acute respiratory failure due to COVID-19 infection, DM-2, OA, Ambulatory dysfunction, Bacteremia and L1-L2 OM/Diskitis, L2-3 phlegmonous changes and L1-3 epidural abscess. Patient is here today for 8 weeks follow up. He is on IV Abx and following up with ID. Patient reports feeling much better than 2 months ago despite insignificant changes with follow up images findings. Patient mild to moderate back pain, usually with movement, chronic residual right LE radiculopathy, Patient able to walk, denies any B/B dysfunction or saddle anaesthesia.     Exam-A&OX3. Bob, strength 5/5 and sensation to LT intact bilaterally. DTR 2 +, no clonus bilaterally. Mild tenderness at Upper Lumbar spine region. Limping gait, uses cane for safety.    Hx, Exam and images  reviewed with the patient . Mx plan discussed. I would recommend MRI of Lumbar spine wo contrast.and also flexion extension of Lumbar spine.     Labs: ESR=90 in June to 44 now in August  CRP 7.8 to 1.4 ( These inflammatory markers shows good prognosis, though not specific)    Hx, Exam, and images reviewed with the patient. Mx plan discussed. Patient is feeling better, except residual previous radicular symptoms, and mild and occasional lower back pain. I would recommend continue ABX and follow up with ID . Flex-Ext Lumabr spine XR, MRI of Lumbar spine, follow up after images results.    Plan:  MRI of Lumbar spine w/wo contrast  Flexion-Extension Lumbar spine x-rays  BUN & Creatinine  F/U after images completion.  Call with questions or concern.          Chief Complaint   Patient presents with    Follow-up     discitis/osteomyelitis at L2-L3, 7.0 cm ventral epidural abscess         HISTORY    History of Present Illness     74 y.o. year old male here for follow up of L    HPI    See  detailed  discussion above    REVIEW OF SYSTEMS  ROS personally reviewed and updated as follows  Review of Systems   Constitutional: Negative.    HENT: Negative.     Eyes: Negative.    Respiratory: Negative.     Cardiovascular: Negative.    Gastrointestinal: Negative.    Endocrine: Negative.    Genitourinary: Negative.    Musculoskeletal:  Positive for back pain.         2 month f/u -L/S  MRI scheduled on 7/7/24  discitis/osteomyelitis at L2-L3, 7.0 cm ventral epidural abscess       Back non radiating- has improved   Pain 3/10 today   Skin: Negative.    Allergic/Immunologic: Negative.    Neurological: Negative.    Hematological: Negative.    Psychiatric/Behavioral: Negative.     All other systems reviewed and are negative.      ROS obtained by MA. Reviewed. See HPI.     Meds/Allergies     Current Outpatient Medications   Medication Sig Dispense Refill    Alcohol Swabs 70 % PADS May substitute brand based on insurance coverage. Check glucose TID. 100 each 0    aspirin 81 mg chewable tablet Chew 81 mg daily      Blood Glucose Monitoring Suppl (OneTouch Verio Reflect) w/Device KIT May substitute brand based on insurance coverage. Check glucose TID. 1 kit 0    cefTRIAXone (ROCEPHIN) 10 g injection       Ceftriaxone Sodium 2 g SOLR Inject 2 g into a catheter in a vein every 24 hours. 20 ml solution over 5 minutes IV push  Indications: .      glucose blood (OneTouch Verio) test strip May substitute brand based on insurance coverage. Check glucose TID. 100 each 0    insulin degludec (Tresiba FlexTouch) 100 units/mL injection pen Inject 32 Units under the skin daily at bedtime 15 mL 0    insulin lispro (HumaLOG KwikPen) 100 units/mL injection pen Inject 10 Units under the skin 3 (three) times a day with meals (Patient not taking: Reported on 8/8/2024) 15 mL 0    Insulin Pen Needle (BD Pen Needle Catherine 2nd Gen) 32G X 4 MM MISC For use with insulin pen. Pharmacy may dispense brand covered by insurance. (Patient not taking:  Reported on 7/11/2024) 100 each 0    Insulin Pen Needle (BD Pen Needle Catherine 2nd Gen) 32G X 4 MM MISC For use with insulin pen. Pharmacy may dispense brand covered by insurance. (Patient not taking: Reported on 8/12/2024) 100 each 0    metoprolol succinate (TOPROL-XL) 50 mg 24 hr tablet Take 50 mg by mouth daily. Indications: .      metroNIDAZOLE (FLAGYL) 500 mg tablet Take 1 tablet (500 mg total) by mouth every 8 (eight) hours 126 tablet 0    OneTouch Delica Lancets 33G MISC May substitute brand based on insurance coverage. Check glucose TID. 100 each 0    potassium chloride (Klor-Con M20) 20 mEq tablet Take 20 mEq by mouth daily. Indications: .      ramipril (ALTACE) 1.25 mg capsule Take 1.25 mg by mouth daily.      Sodium Chloride Flush (Normal Saline Flush) 0.9 % SOLN Inject 10-20 mL into a catheter in a vein in the morning. Before and after Abx and as needed for flushing  Indications: .      TiZANidine (ZANAFLEX) 4 MG capsule Take 4 mg by mouth 3 (three) times a day as needed for muscle spasms      torsemide (DEMADEX) 20 mg tablet Take 20 mg by mouth daily       No current facility-administered medications for this visit.       Allergies   Allergen Reactions    Poultry Meal - Food Allergy Vomiting       PAST HISTORY    Past Medical History:   Diagnosis Date    Atypical chest pain     Back pain     CHF (congestive heart failure) (HCC)     Constipation 06/01/2024    Diabetes mellitus (HCC)     Legionnaire's disease (HCC)     Leukocytosis 06/02/2024    Lung disorder     Migraine     PONV (postoperative nausea and vomiting)        Past Surgical History:   Procedure Laterality Date    FOOT SURGERY Bilateral     plantar fasciitis    IR OTHER  07/02/2024    OTHER SURGICAL HISTORY      left arm    ROTATOR CUFF REPAIR Bilateral     TONSILLECTOMY      TRIGGER FINGER RELEASE         Social History     Tobacco Use    Smoking status: Former     Current packs/day: 0.00     Average packs/day: 1 pack/day for 25.0 years (25.0  ttl pk-yrs)     Types: Cigarettes, Cigars     Start date:      Quit date: 2000     Years since quittin.6    Smokeless tobacco: Never   Vaping Use    Vaping status: Never Used   Substance Use Topics    Alcohol use: Yes     Comment: occ    Drug use: Not Currently     Comment: CBD Oil pt stated no longer using       No family history on file.      Above history personally reviewed.       EXAM    Vitals:There were no vitals taken for this visit.,There is no height or weight on file to calculate BMI.     Physical Exam  Constitutional:       Appearance: Normal appearance.   Eyes:      Extraocular Movements: Extraocular movements intact.      Pupils: Pupils are equal, round, and reactive to light.   Pulmonary:      Effort: Pulmonary effort is normal.   Musculoskeletal:         General: Tenderness present.      Cervical back: Normal range of motion.   Neurological:      General: No focal deficit present.      Mental Status: He is alert and oriented to person, place, and time.      Sensory: Sensory deficit present.      Gait: Gait abnormal.      Deep Tendon Reflexes:      Reflex Scores:       Tricep reflexes are 2+ on the right side and 2+ on the left side.       Bicep reflexes are 2+ on the right side and 2+ on the left side.       Brachioradialis reflexes are 2+ on the right side and 2+ on the left side.       Patellar reflexes are 2+ on the right side and 2+ on the left side.       Achilles reflexes are 2+ on the right side and 2+ on the left side.  Psychiatric:         Speech: Speech normal.         Neurologic Exam     Mental Status   Oriented to person, place, and time.   Speech: speech is normal   Level of consciousness: alert    Cranial Nerves     CN III, IV, VI   Pupils are equal, round, and reactive to light.    CN XI   CN XI normal.     Motor Exam   Muscle bulk: normal  Overall muscle tone: normal  Right arm tone: normal  Left arm tone: normal  Right arm pronator drift: absent  Left arm pronator drift:  absent  Right leg tone: normal  Left leg tone: normal    Sensory Exam   Light touch normal.     Gait, Coordination, and Reflexes     Reflexes   Right brachioradialis: 2+  Left brachioradialis: 2+  Right biceps: 2+  Left biceps: 2+  Right triceps: 2+  Left triceps: 2+  Right patellar: 2+  Left patellar: 2+  Right achilles: 2+  Left achilles: 2+  Right : 2+  Left : 2+  Right Mayen: absent  Left Mayen: absent  Right ankle clonus: absent  Left ankle clonus: absent        MEDICAL DECISION MAKING    Imaging Studies:     MRI lumbar spine w wo contrast    Result Date: 7/22/2024  Narrative: MRI LUMBAR SPINE WITH AND WITHOUT CONTRAST INDICATION: M46.26: Osteomyelitis of vertebra, lumbar region R78.81: Bacteremia. COMPARISON: MRI lumbar spine 6/4/2024. TECHNIQUE:  Multiplanar, multisequence imaging of the lumbar spine was performed before and after gadolinium administration. . IV Contrast:  8 mL of Gadobutrol injection (SINGLE-DOSE) IMAGE QUALITY: Motion-degraded. FINDINGS: VERTEBRAL BODIES: Mild retrolisthesis of L5 on S1. No acute compression fracture. Findings in keeping with L2-L3 discitis osteomyelitis, with progressive marrow abnormalities in the L2 and L3 vertebral bodies, including enhancement and edema. Furthermore, there is progressive loss of disc height at L2-L3. Adjacent paravertebral signal  abnormality and enhancement within the musculature is in keeping with adjacent infectious/inflammatory change without evidence of a definite organized collection. There is a ventral epidural abscess that spans L1-L4 though is decreased in anteroposterior dimension from the prior study. There is mild resultant spinal canal stenosis at these levels as a result. There is extension of infectious/inflammatory change and enhancement into the left greater than right L2-L3 and L3-L4 neural foramen, which show marked neuroforaminal narrowing. SACRUM: No acute abnormality. DISTAL CORD AND CONUS:  Normal size and signal  within the distal cord and conus. PARASPINAL SOFT TISSUES: As above. LOWER THORACIC DISC SPACES: Spondylotic changes without acute critical central canal stenosis. LUMBAR DISC SPACES: Apart from the findings at L2-L3, there is multilevel degenerative disc disease, greatest and marked at L5-S1. L1-L2: Disc bulge. Mild facet arthropathy. No significant neuroforaminal narrowing or spinal canal stenosis. L2-L3: Discussed above. Mild-moderate facet arthropathy. Mild to moderate left and marked right neuroforaminal narrowing. Mild spinal canal stenosis. L3-L4: Discussed above. Disc bulge. Mild facet arthropathy. Mild right and marked left neuroforaminal narrowing. Mild spinal canal stenosis. L4-L5: Disc bulge. Moderate facet arthropathy. No significant neuroforaminal narrowing or spinal canal stenosis. L5-S1: Disc bulge. Moderate facet arthropathy. Mild-moderate bilateral neuroforaminal narrowing. POSTCONTRAST IMAGING: As above. OTHER FINDINGS: Scattered T2 hyperintensities in the kidneys, statistically cysts.     Impression: 1. Findings in keeping with discitis osteomyelitis at L2-L3, with progressive marrow signal changes and enhancement as well as progressive loss of disc height at L2-L3. There is extension into the paravertebral soft tissues, as detailed above. A ventral epidural collection spans L1-L4 though is decreased in enrique-posterior dimension from the prior and results in mild associated spinal canal stenosis. Additionally, there is increased extension into the L2-L3 and L3-L4 neural foramen, left greater than right, with associated marked neuroforaminal narrowing on the left at these levels, detailed above. 2. Multilevel lumbar spine spondylotic changes. See narrative above for full details. The study was marked in EPIC for immediate notification. Workstation performed: [id number]       I have personally reviewed pertinent reports.   and I have personally reviewed pertinent films in PACS

## 2024-08-19 ENCOUNTER — TELEPHONE (OUTPATIENT)
Dept: INFECTIOUS DISEASES | Facility: CLINIC | Age: 74
End: 2024-08-19

## 2024-08-19 NOTE — TELEPHONE ENCOUNTER
Attempt to contact patient, r/s appt until after MRI. MRI scheduled for 9/4. Spoke with Miguel Ángel. R/S to 9/11 12:30 virtual visit.

## 2024-08-20 ENCOUNTER — HOME CARE VISIT (OUTPATIENT)
Dept: HOME HEALTH SERVICES | Facility: HOME HEALTHCARE | Age: 74
End: 2024-08-20
Payer: MEDICARE

## 2024-08-20 ENCOUNTER — APPOINTMENT (OUTPATIENT)
Dept: LAB | Facility: MEDICAL CENTER | Age: 74
End: 2024-08-20
Payer: MEDICARE

## 2024-08-20 VITALS
HEART RATE: 68 BPM | RESPIRATION RATE: 20 BRPM | SYSTOLIC BLOOD PRESSURE: 128 MMHG | TEMPERATURE: 98.7 F | DIASTOLIC BLOOD PRESSURE: 76 MMHG | OXYGEN SATURATION: 94 %

## 2024-08-20 DIAGNOSIS — R78.81 BACTEREMIA: ICD-10-CM

## 2024-08-20 DIAGNOSIS — G06.2 EPIDURAL ABSCESS: ICD-10-CM

## 2024-08-20 DIAGNOSIS — M46.26 OSTEOMYELITIS OF LUMBAR SPINE (HCC): ICD-10-CM

## 2024-08-20 LAB
ALBUMIN SERPL BCG-MCNC: 3.9 G/DL (ref 3.5–5)
ALP SERPL-CCNC: 38 U/L (ref 34–104)
ALT SERPL W P-5'-P-CCNC: 13 U/L (ref 7–52)
ANION GAP SERPL CALCULATED.3IONS-SCNC: 11 MMOL/L (ref 4–13)
AST SERPL W P-5'-P-CCNC: 21 U/L (ref 13–39)
BASOPHILS # BLD AUTO: 0.04 THOUSANDS/ÂΜL (ref 0–0.1)
BASOPHILS NFR BLD AUTO: 1 % (ref 0–1)
BILIRUB SERPL-MCNC: 0.41 MG/DL (ref 0.2–1)
BUN SERPL-MCNC: 18 MG/DL (ref 5–25)
CALCIUM SERPL-MCNC: 9.7 MG/DL (ref 8.4–10.2)
CHLORIDE SERPL-SCNC: 101 MMOL/L (ref 96–108)
CO2 SERPL-SCNC: 26 MMOL/L (ref 21–32)
CREAT SERPL-MCNC: 0.82 MG/DL (ref 0.6–1.3)
CRP SERPL QL: 1.4 MG/L
EOSINOPHIL # BLD AUTO: 0.1 THOUSAND/ÂΜL (ref 0–0.61)
EOSINOPHIL NFR BLD AUTO: 2 % (ref 0–6)
ERYTHROCYTE [DISTWIDTH] IN BLOOD BY AUTOMATED COUNT: 14.3 % (ref 11.6–15.1)
ERYTHROCYTE [SEDIMENTATION RATE] IN BLOOD: 32 MM/HOUR (ref 0–19)
GFR SERPL CREATININE-BSD FRML MDRD: 87 ML/MIN/1.73SQ M
GLUCOSE SERPL-MCNC: 144 MG/DL (ref 65–140)
HCT VFR BLD AUTO: 41.3 % (ref 36.5–49.3)
HGB BLD-MCNC: 13.9 G/DL (ref 12–17)
IMM GRANULOCYTES # BLD AUTO: 0.02 THOUSAND/UL (ref 0–0.2)
IMM GRANULOCYTES NFR BLD AUTO: 0 % (ref 0–2)
LYMPHOCYTES # BLD AUTO: 1.69 THOUSANDS/ÂΜL (ref 0.6–4.47)
LYMPHOCYTES NFR BLD AUTO: 26 % (ref 14–44)
MCH RBC QN AUTO: 30.5 PG (ref 26.8–34.3)
MCHC RBC AUTO-ENTMCNC: 33.7 G/DL (ref 31.4–37.4)
MCV RBC AUTO: 91 FL (ref 82–98)
MONOCYTES # BLD AUTO: 0.57 THOUSAND/ÂΜL (ref 0.17–1.22)
MONOCYTES NFR BLD AUTO: 9 % (ref 4–12)
NEUTROPHILS # BLD AUTO: 4.09 THOUSANDS/ÂΜL (ref 1.85–7.62)
NEUTS SEG NFR BLD AUTO: 62 % (ref 43–75)
NRBC BLD AUTO-RTO: 0 /100 WBCS
PLATELET # BLD AUTO: 210 THOUSANDS/UL (ref 149–390)
PMV BLD AUTO: 11 FL (ref 8.9–12.7)
POTASSIUM SERPL-SCNC: 3.5 MMOL/L (ref 3.5–5.3)
PROT SERPL-MCNC: 6.9 G/DL (ref 6.4–8.4)
RBC # BLD AUTO: 4.56 MILLION/UL (ref 3.88–5.62)
SODIUM SERPL-SCNC: 138 MMOL/L (ref 135–147)
WBC # BLD AUTO: 6.51 THOUSAND/UL (ref 4.31–10.16)

## 2024-08-20 PROCEDURE — G0299 HHS/HOSPICE OF RN EA 15 MIN: HCPCS

## 2024-08-20 PROCEDURE — 36415 COLL VENOUS BLD VENIPUNCTURE: CPT

## 2024-08-20 PROCEDURE — 85652 RBC SED RATE AUTOMATED: CPT

## 2024-08-20 PROCEDURE — 85025 COMPLETE CBC W/AUTO DIFF WBC: CPT

## 2024-08-20 PROCEDURE — 80053 COMPREHEN METABOLIC PANEL: CPT

## 2024-08-20 PROCEDURE — 86140 C-REACTIVE PROTEIN: CPT

## 2024-08-27 ENCOUNTER — APPOINTMENT (OUTPATIENT)
Dept: LAB | Facility: MEDICAL CENTER | Age: 74
End: 2024-08-27
Payer: MEDICARE

## 2024-08-27 ENCOUNTER — HOME CARE VISIT (OUTPATIENT)
Dept: HOME HEALTH SERVICES | Facility: HOME HEALTHCARE | Age: 74
End: 2024-08-27
Payer: MEDICARE

## 2024-08-27 VITALS
OXYGEN SATURATION: 96 % | HEART RATE: 68 BPM | SYSTOLIC BLOOD PRESSURE: 110 MMHG | TEMPERATURE: 98 F | RESPIRATION RATE: 16 BRPM | DIASTOLIC BLOOD PRESSURE: 70 MMHG

## 2024-08-27 DIAGNOSIS — G06.2 EPIDURAL ABSCESS: ICD-10-CM

## 2024-08-27 DIAGNOSIS — M46.26 OSTEOMYELITIS OF LUMBAR SPINE (HCC): ICD-10-CM

## 2024-08-27 DIAGNOSIS — R78.81 BACTEREMIA: ICD-10-CM

## 2024-08-27 LAB
BASOPHILS # BLD AUTO: 0.02 THOUSANDS/ÂΜL (ref 0–0.1)
BASOPHILS NFR BLD AUTO: 0 % (ref 0–1)
EOSINOPHIL # BLD AUTO: 0.12 THOUSAND/ÂΜL (ref 0–0.61)
EOSINOPHIL NFR BLD AUTO: 2 % (ref 0–6)
ERYTHROCYTE [DISTWIDTH] IN BLOOD BY AUTOMATED COUNT: 14.5 % (ref 11.6–15.1)
ERYTHROCYTE [SEDIMENTATION RATE] IN BLOOD: 33 MM/HOUR (ref 0–19)
HCT VFR BLD AUTO: 41.8 % (ref 36.5–49.3)
HGB BLD-MCNC: 14.1 G/DL (ref 12–17)
IMM GRANULOCYTES # BLD AUTO: 0.02 THOUSAND/UL (ref 0–0.2)
IMM GRANULOCYTES NFR BLD AUTO: 0 % (ref 0–2)
LYMPHOCYTES # BLD AUTO: 1.74 THOUSANDS/ÂΜL (ref 0.6–4.47)
LYMPHOCYTES NFR BLD AUTO: 28 % (ref 14–44)
MCH RBC QN AUTO: 31.1 PG (ref 26.8–34.3)
MCHC RBC AUTO-ENTMCNC: 33.7 G/DL (ref 31.4–37.4)
MCV RBC AUTO: 92 FL (ref 82–98)
MONOCYTES # BLD AUTO: 0.54 THOUSAND/ÂΜL (ref 0.17–1.22)
MONOCYTES NFR BLD AUTO: 9 % (ref 4–12)
NEUTROPHILS # BLD AUTO: 3.73 THOUSANDS/ÂΜL (ref 1.85–7.62)
NEUTS SEG NFR BLD AUTO: 61 % (ref 43–75)
NRBC BLD AUTO-RTO: 0 /100 WBCS
PLATELET # BLD AUTO: 226 THOUSANDS/UL (ref 149–390)
PMV BLD AUTO: 10.5 FL (ref 8.9–12.7)
RBC # BLD AUTO: 4.54 MILLION/UL (ref 3.88–5.62)
WBC # BLD AUTO: 6.17 THOUSAND/UL (ref 4.31–10.16)

## 2024-08-27 PROCEDURE — 86140 C-REACTIVE PROTEIN: CPT

## 2024-08-27 PROCEDURE — 80053 COMPREHEN METABOLIC PANEL: CPT

## 2024-08-27 PROCEDURE — 85025 COMPLETE CBC W/AUTO DIFF WBC: CPT

## 2024-08-27 PROCEDURE — 36415 COLL VENOUS BLD VENIPUNCTURE: CPT

## 2024-08-27 PROCEDURE — 85652 RBC SED RATE AUTOMATED: CPT

## 2024-08-27 PROCEDURE — G0299 HHS/HOSPICE OF RN EA 15 MIN: HCPCS

## 2024-08-28 LAB
ALBUMIN SERPL BCG-MCNC: 4 G/DL (ref 3.5–5)
ALP SERPL-CCNC: 42 U/L (ref 34–104)
ALT SERPL W P-5'-P-CCNC: 11 U/L (ref 7–52)
ANION GAP SERPL CALCULATED.3IONS-SCNC: 11 MMOL/L (ref 4–13)
AST SERPL W P-5'-P-CCNC: 16 U/L (ref 13–39)
BILIRUB SERPL-MCNC: 0.25 MG/DL (ref 0.2–1)
BUN SERPL-MCNC: 25 MG/DL (ref 5–25)
CALCIUM SERPL-MCNC: 9.5 MG/DL (ref 8.4–10.2)
CHLORIDE SERPL-SCNC: 100 MMOL/L (ref 96–108)
CO2 SERPL-SCNC: 26 MMOL/L (ref 21–32)
CREAT SERPL-MCNC: 1.04 MG/DL (ref 0.6–1.3)
CRP SERPL QL: 2 MG/L
GFR SERPL CREATININE-BSD FRML MDRD: 70 ML/MIN/1.73SQ M
GLUCOSE SERPL-MCNC: 116 MG/DL (ref 65–140)
POTASSIUM SERPL-SCNC: 3.9 MMOL/L (ref 3.5–5.3)
PROT SERPL-MCNC: 7.2 G/DL (ref 6.4–8.4)
SODIUM SERPL-SCNC: 137 MMOL/L (ref 135–147)

## 2024-09-03 ENCOUNTER — HOME CARE VISIT (OUTPATIENT)
Dept: HOME HEALTH SERVICES | Facility: HOME HEALTHCARE | Age: 74
End: 2024-09-03
Payer: MEDICARE

## 2024-09-03 ENCOUNTER — APPOINTMENT (OUTPATIENT)
Dept: LAB | Facility: MEDICAL CENTER | Age: 74
End: 2024-09-03
Payer: MEDICARE

## 2024-09-03 DIAGNOSIS — M46.26 OSTEOMYELITIS OF LUMBAR SPINE (HCC): ICD-10-CM

## 2024-09-03 DIAGNOSIS — G06.2 EPIDURAL ABSCESS: ICD-10-CM

## 2024-09-03 DIAGNOSIS — R78.81 BACTEREMIA: ICD-10-CM

## 2024-09-03 LAB
ALBUMIN SERPL BCG-MCNC: 4.1 G/DL (ref 3.5–5)
ALP SERPL-CCNC: 42 U/L (ref 34–104)
ALT SERPL W P-5'-P-CCNC: 15 U/L (ref 7–52)
ANION GAP SERPL CALCULATED.3IONS-SCNC: 12 MMOL/L (ref 4–13)
AST SERPL W P-5'-P-CCNC: 18 U/L (ref 13–39)
BASOPHILS # BLD AUTO: 0.04 THOUSANDS/ÂΜL (ref 0–0.1)
BASOPHILS NFR BLD AUTO: 1 % (ref 0–1)
BILIRUB SERPL-MCNC: 0.33 MG/DL (ref 0.2–1)
BUN SERPL-MCNC: 28 MG/DL (ref 5–25)
CALCIUM SERPL-MCNC: 9.6 MG/DL (ref 8.4–10.2)
CHLORIDE SERPL-SCNC: 98 MMOL/L (ref 96–108)
CO2 SERPL-SCNC: 26 MMOL/L (ref 21–32)
CREAT SERPL-MCNC: 0.92 MG/DL (ref 0.6–1.3)
CRP SERPL QL: 1.8 MG/L
EOSINOPHIL # BLD AUTO: 0.1 THOUSAND/ÂΜL (ref 0–0.61)
EOSINOPHIL NFR BLD AUTO: 1 % (ref 0–6)
ERYTHROCYTE [DISTWIDTH] IN BLOOD BY AUTOMATED COUNT: 14.3 % (ref 11.6–15.1)
ERYTHROCYTE [SEDIMENTATION RATE] IN BLOOD: 38 MM/HOUR (ref 0–19)
GFR SERPL CREATININE-BSD FRML MDRD: 81 ML/MIN/1.73SQ M
GLUCOSE SERPL-MCNC: 117 MG/DL (ref 65–140)
HCT VFR BLD AUTO: 43 % (ref 36.5–49.3)
HGB BLD-MCNC: 14.4 G/DL (ref 12–17)
IMM GRANULOCYTES # BLD AUTO: 0.04 THOUSAND/UL (ref 0–0.2)
IMM GRANULOCYTES NFR BLD AUTO: 1 % (ref 0–2)
LYMPHOCYTES # BLD AUTO: 1.91 THOUSANDS/ÂΜL (ref 0.6–4.47)
LYMPHOCYTES NFR BLD AUTO: 27 % (ref 14–44)
MCH RBC QN AUTO: 30.7 PG (ref 26.8–34.3)
MCHC RBC AUTO-ENTMCNC: 33.5 G/DL (ref 31.4–37.4)
MCV RBC AUTO: 92 FL (ref 82–98)
MONOCYTES # BLD AUTO: 0.61 THOUSAND/ÂΜL (ref 0.17–1.22)
MONOCYTES NFR BLD AUTO: 9 % (ref 4–12)
NEUTROPHILS # BLD AUTO: 4.4 THOUSANDS/ÂΜL (ref 1.85–7.62)
NEUTS SEG NFR BLD AUTO: 61 % (ref 43–75)
NRBC BLD AUTO-RTO: 0 /100 WBCS
PLATELET # BLD AUTO: 237 THOUSANDS/UL (ref 149–390)
PMV BLD AUTO: 10 FL (ref 8.9–12.7)
POTASSIUM SERPL-SCNC: 4.1 MMOL/L (ref 3.5–5.3)
PROT SERPL-MCNC: 7.2 G/DL (ref 6.4–8.4)
RBC # BLD AUTO: 4.69 MILLION/UL (ref 3.88–5.62)
SODIUM SERPL-SCNC: 136 MMOL/L (ref 135–147)
WBC # BLD AUTO: 7.1 THOUSAND/UL (ref 4.31–10.16)

## 2024-09-03 PROCEDURE — 85652 RBC SED RATE AUTOMATED: CPT

## 2024-09-03 PROCEDURE — 80053 COMPREHEN METABOLIC PANEL: CPT

## 2024-09-03 PROCEDURE — 36415 COLL VENOUS BLD VENIPUNCTURE: CPT

## 2024-09-03 PROCEDURE — 85025 COMPLETE CBC W/AUTO DIFF WBC: CPT

## 2024-09-03 PROCEDURE — G0299 HHS/HOSPICE OF RN EA 15 MIN: HCPCS

## 2024-09-03 PROCEDURE — 86140 C-REACTIVE PROTEIN: CPT

## 2024-09-04 ENCOUNTER — HOSPITAL ENCOUNTER (OUTPATIENT)
Dept: MRI IMAGING | Facility: HOSPITAL | Age: 74
Discharge: HOME | End: 2024-09-04
Payer: MEDICARE

## 2024-09-04 VITALS
DIASTOLIC BLOOD PRESSURE: 66 MMHG | HEART RATE: 76 BPM | OXYGEN SATURATION: 96 % | RESPIRATION RATE: 20 BRPM | SYSTOLIC BLOOD PRESSURE: 126 MMHG | TEMPERATURE: 97.9 F

## 2024-09-04 DIAGNOSIS — M46.26 OSTEOMYELITIS OF LUMBAR SPINE (HCC): ICD-10-CM

## 2024-09-04 PROCEDURE — A9585 GADOBUTROL INJECTION: HCPCS | Performed by: PHYSICIAN ASSISTANT

## 2024-09-04 PROCEDURE — 72158 MRI LUMBAR SPINE W/O & W/DYE: CPT

## 2024-09-04 RX ORDER — GADOBUTROL 604.72 MG/ML
8 INJECTION INTRAVENOUS
Status: COMPLETED | OUTPATIENT
Start: 2024-09-04 | End: 2024-09-04

## 2024-09-04 RX ADMIN — GADOBUTROL 8 ML: 604.72 INJECTION INTRAVENOUS at 14:35

## 2024-09-10 ENCOUNTER — APPOINTMENT (OUTPATIENT)
Dept: LAB | Facility: MEDICAL CENTER | Age: 74
End: 2024-09-10
Payer: MEDICARE

## 2024-09-10 ENCOUNTER — HOME CARE VISIT (OUTPATIENT)
Dept: HOME HEALTH SERVICES | Facility: HOME HEALTHCARE | Age: 74
End: 2024-09-10
Payer: MEDICARE

## 2024-09-10 VITALS
RESPIRATION RATE: 16 BRPM | SYSTOLIC BLOOD PRESSURE: 118 MMHG | HEART RATE: 68 BPM | DIASTOLIC BLOOD PRESSURE: 64 MMHG | OXYGEN SATURATION: 95 % | TEMPERATURE: 98.2 F

## 2024-09-10 DIAGNOSIS — M46.26 OSTEOMYELITIS OF LUMBAR SPINE (HCC): ICD-10-CM

## 2024-09-10 DIAGNOSIS — R78.81 BACTEREMIA: ICD-10-CM

## 2024-09-10 DIAGNOSIS — G06.2 EPIDURAL ABSCESS: ICD-10-CM

## 2024-09-10 LAB
ALBUMIN SERPL BCG-MCNC: 4 G/DL (ref 3.5–5)
ALP SERPL-CCNC: 40 U/L (ref 34–104)
ALT SERPL W P-5'-P-CCNC: 11 U/L (ref 7–52)
ANION GAP SERPL CALCULATED.3IONS-SCNC: 9 MMOL/L (ref 4–13)
AST SERPL W P-5'-P-CCNC: 15 U/L (ref 13–39)
BASOPHILS # BLD AUTO: 0.03 THOUSANDS/ÂΜL (ref 0–0.1)
BASOPHILS NFR BLD AUTO: 1 % (ref 0–1)
BILIRUB SERPL-MCNC: 0.31 MG/DL (ref 0.2–1)
BUN SERPL-MCNC: 21 MG/DL (ref 5–25)
CALCIUM SERPL-MCNC: 9.5 MG/DL (ref 8.4–10.2)
CHLORIDE SERPL-SCNC: 99 MMOL/L (ref 96–108)
CO2 SERPL-SCNC: 29 MMOL/L (ref 21–32)
CREAT SERPL-MCNC: 0.89 MG/DL (ref 0.6–1.3)
CRP SERPL QL: 1.1 MG/L
EOSINOPHIL # BLD AUTO: 0.11 THOUSAND/ÂΜL (ref 0–0.61)
EOSINOPHIL NFR BLD AUTO: 2 % (ref 0–6)
ERYTHROCYTE [DISTWIDTH] IN BLOOD BY AUTOMATED COUNT: 14.1 % (ref 11.6–15.1)
ERYTHROCYTE [SEDIMENTATION RATE] IN BLOOD: 32 MM/HOUR (ref 0–19)
GFR SERPL CREATININE-BSD FRML MDRD: 84 ML/MIN/1.73SQ M
GLUCOSE SERPL-MCNC: 128 MG/DL (ref 65–140)
HCT VFR BLD AUTO: 41.3 % (ref 36.5–49.3)
HGB BLD-MCNC: 13.9 G/DL (ref 12–17)
IMM GRANULOCYTES # BLD AUTO: 0.02 THOUSAND/UL (ref 0–0.2)
IMM GRANULOCYTES NFR BLD AUTO: 0 % (ref 0–2)
LYMPHOCYTES # BLD AUTO: 1.75 THOUSANDS/ÂΜL (ref 0.6–4.47)
LYMPHOCYTES NFR BLD AUTO: 29 % (ref 14–44)
MCH RBC QN AUTO: 31 PG (ref 26.8–34.3)
MCHC RBC AUTO-ENTMCNC: 33.7 G/DL (ref 31.4–37.4)
MCV RBC AUTO: 92 FL (ref 82–98)
MONOCYTES # BLD AUTO: 0.53 THOUSAND/ÂΜL (ref 0.17–1.22)
MONOCYTES NFR BLD AUTO: 9 % (ref 4–12)
NEUTROPHILS # BLD AUTO: 3.6 THOUSANDS/ÂΜL (ref 1.85–7.62)
NEUTS SEG NFR BLD AUTO: 59 % (ref 43–75)
NRBC BLD AUTO-RTO: 0 /100 WBCS
PLATELET # BLD AUTO: 210 THOUSANDS/UL (ref 149–390)
PMV BLD AUTO: 10.3 FL (ref 8.9–12.7)
POTASSIUM SERPL-SCNC: 3.7 MMOL/L (ref 3.5–5.3)
PROT SERPL-MCNC: 6.9 G/DL (ref 6.4–8.4)
RBC # BLD AUTO: 4.48 MILLION/UL (ref 3.88–5.62)
SODIUM SERPL-SCNC: 137 MMOL/L (ref 135–147)
WBC # BLD AUTO: 6.04 THOUSAND/UL (ref 4.31–10.16)

## 2024-09-10 PROCEDURE — 86140 C-REACTIVE PROTEIN: CPT

## 2024-09-10 PROCEDURE — 85025 COMPLETE CBC W/AUTO DIFF WBC: CPT

## 2024-09-10 PROCEDURE — 36415 COLL VENOUS BLD VENIPUNCTURE: CPT

## 2024-09-10 PROCEDURE — 80053 COMPREHEN METABOLIC PANEL: CPT

## 2024-09-10 PROCEDURE — 85652 RBC SED RATE AUTOMATED: CPT

## 2024-09-10 PROCEDURE — G0299 HHS/HOSPICE OF RN EA 15 MIN: HCPCS

## 2024-09-11 ENCOUNTER — TELEMEDICINE (OUTPATIENT)
Dept: INFECTIOUS DISEASES | Facility: CLINIC | Age: 74
End: 2024-09-11
Payer: MEDICARE

## 2024-09-11 ENCOUNTER — TELEPHONE (OUTPATIENT)
Dept: INFECTIOUS DISEASES | Facility: CLINIC | Age: 74
End: 2024-09-11

## 2024-09-11 ENCOUNTER — HOME CARE VISIT (OUTPATIENT)
Dept: HOME HEALTH SERVICES | Facility: HOME HEALTHCARE | Age: 74
End: 2024-09-11
Payer: MEDICARE

## 2024-09-11 DIAGNOSIS — Z79.4 TYPE 2 DIABETES MELLITUS WITH OTHER SPECIFIED COMPLICATION, WITH LONG-TERM CURRENT USE OF INSULIN (HCC): ICD-10-CM

## 2024-09-11 DIAGNOSIS — M46.26 OSTEOMYELITIS OF LUMBAR SPINE (HCC): Primary | ICD-10-CM

## 2024-09-11 DIAGNOSIS — R78.81 BACTEREMIA: ICD-10-CM

## 2024-09-11 DIAGNOSIS — E11.69 TYPE 2 DIABETES MELLITUS WITH OTHER SPECIFIED COMPLICATION, WITH LONG-TERM CURRENT USE OF INSULIN (HCC): ICD-10-CM

## 2024-09-11 PROCEDURE — 99214 OFFICE O/P EST MOD 30 MIN: CPT | Performed by: PHYSICIAN ASSISTANT

## 2024-09-11 NOTE — ASSESSMENT & PLAN NOTE
Bacteroides bacteremia complicated by osteomyelitis of L2-3 with epidural abscess and myositis.    MRI with OM at L2-3 with epidural abscess and myositis.  No plans for intervention per neurosurgery.  IR biopsy deferred given concern for poor yield that may not outweigh risk of procedure on discussion with IR attending.   Patient eventually discharged to complete a course of IV ceftriaxone and po flagyl.      Patient remains on IV ceftriaxone and po flagyl.  His course was extended through today for a total of approximately 3.5 months.  He is having some GI upset with the flagyl.  Back pain is improved.      MRI now shows resolution of phlegmon.  CRP is WNL.  And ESR has plateaued over the last 4 blood draws in the 30s.  Discussed all of this with the patient.  Based on imaging, labs and clinical picture would recommend discontinuation of the antibiotics at this time.  Also discussed with patient that there is always a chance of relapsing infection.  Reviewed signs and symptoms for him to watch for.  If he has symptoms of relapsing infection he should go to the ED for evaluation.          Plan  - discontinue antibiotics at this time  - [facility] made aware of discontinuation of IV ceftriaxone  - Discontinue PICC line - this was communicated to [facility]  - no further ID follow up scheduled  - patient to call with any questions

## 2024-09-11 NOTE — ASSESSMENT & PLAN NOTE
Bacteroides bacteremia complicated by osteomyelitis of L2-3 with epidural abscess and myositis.    MRI with OM at L2-3 with epidural abscess and myositis.  No plans for intervention per neurosurgery.  IR biopsy deferred given concern for poor yield that may not outweigh risk of procedure on discussion with IR attending.   Patient eventually discharged to complete a course of IV ceftriaxone and po flagyl.      Patient remains on IV ceftriaxone and po flagyl.  His course was extended through today for a total of approximately 3.5 months.  He is having some GI upset with the flagyl.  Back pain is improved.       MRI now shows resolution of phlegmon.  CRP is WNL.  And ESR has plateaued over the last 4 blood draws in the 30s.  Discussed all of this with the patient.  Based on imaging, labs and clinical picture would recommend discontinuation of the antibiotics at this time.  Also discussed with patient that there is always a chance of relapsing infection.  Reviewed signs and symptoms for him to watch for.  If he has symptoms of relapsing infection he should go to the ED for evaluation.          Plan  - discontinue antibiotics at this time  - [facility] made aware of discontinuation of IV ceftriaxone  - Discontinue PICC line - this was communicated to [facility]  - no further ID follow up scheduled  - patient to call with any questions  Orders:    Discontinue PICC line; Future

## 2024-09-11 NOTE — TELEPHONE ENCOUNTER
Spoke with [facility] today via [employer] chat. Informed ACACIA Joshi patient to complete IV antibiotics today.     Called [facility] and spoke with Brandi.   Informed ACACIA Gaitan patient to complete IV antibiotics today and PICC can be removed. Informed Brandi PICC removal order routed to [facility] at this time.   Brandi states she will get message to care coordinator.

## 2024-09-11 NOTE — PROGRESS NOTES
Ambulatory Visit  Name: Capo Pepper      : 1950      MRN: [id number]  Encounter Provider: Peter Arthur PA-C  Encounter Date: 2024   Encounter department: [facility]    Assessment & Plan  Osteomyelitis of lumbar spine (HCC)  Bacteroides bacteremia complicated by osteomyelitis of L2-3 with epidural abscess and myositis.    MRI with OM at L2-3 with epidural abscess and myositis.  No plans for intervention per neurosurgery.  IR biopsy deferred given concern for poor yield that may not outweigh risk of procedure on discussion with IR attending.   Patient eventually discharged to complete a course of IV ceftriaxone and po flagyl.      Patient remains on IV ceftriaxone and po flagyl.  His course was extended through today for a total of approximately 3.5 months.  He is having some GI upset with the flagyl.  Back pain is improved.       MRI now shows resolution of phlegmon.  CRP is WNL.  And ESR has plateaued over the last 4 blood draws in the 30s.  Discussed all of this with the patient.  Based on imaging, labs and clinical picture would recommend discontinuation of the antibiotics at this time.  Also discussed with patient that there is always a chance of relapsing infection.  Reviewed signs and symptoms for him to watch for.  If he has symptoms of relapsing infection he should go to the ED for evaluation.          Plan  - discontinue antibiotics at this time  - [facility] made aware of discontinuation of IV ceftriaxone  - Discontinue PICC line - this was communicated to [facility]  - no further ID follow up scheduled  - patient to call with any questions  Orders:    Discontinue PICC line; Future    Bacteremia  Bacteroides bacteremia. Completed treatment as above. Again discussed with patient our recommendation for colonoscopy.          Type 2 diabetes mellitus with other specified complication, with long-term current use of insulin (HCC)    Lab Results   Component Value Date     HGBA1C 9.4 (H) 06/01/2024              History of Present Illness     Capo Pepper is a 74 y.o. male who presents for virtual follow up today regarding bacteroides bacteremia complicated by osteomyelitis of L2-3 with epidural abscess and myositis.  Patient remains on IV ceftriaxone and po flagyl.  He is c/o GI upset with the flagyl.  Overall his back pain from the infection has improved.  He is now able to bend over which he could not before. He has no issues with the PICC.       Review of Systems   Constitutional:  Negative for chills and fever.   Respiratory:  Negative for cough and shortness of breath.    Gastrointestinal:  Negative for abdominal pain, diarrhea, nausea and vomiting.   Skin:  Negative for rash.   Psychiatric/Behavioral:  Negative for behavioral problems and confusion.            Objective     There were no vitals taken for this visit.    Physical Exam  Constitutional:       General: He is not in acute distress.     Appearance: Normal appearance. He is not ill-appearing, toxic-appearing or diaphoretic.   HENT:      Head: Normocephalic and atraumatic.   Eyes:      General: No scleral icterus.        Right eye: No discharge.         Left eye: No discharge.      Conjunctiva/sclera: Conjunctivae normal.   Cardiovascular:      Rate and Rhythm: Normal rate.   Pulmonary:      Effort: Pulmonary effort is normal. No respiratory distress.   Skin:     Coloration: Skin is not jaundiced or pale.      Findings: No erythema or rash.      Comments: PICC insertion site without erythema or drainage   Neurological:      Mental Status: He is alert and oriented to person, place, and time.   Psychiatric:         Mood and Affect: Mood normal.         Behavior: Behavior normal.           Labs:   9/10/24  Wbc: 6.04  Hgb: 13.9  Plt: 210  Cr: 0.89  LFTs: WNL  CRP: 1.1  ESR: 32    MRI LUMBAR SPINE WITH AND WITHOUT CONTRAST (9/4/24)     INDICATION: M46.26: Osteomyelitis of vertebra, lumbar region.     COMPARISON: July 22,  2024     TECHNIQUE:  Multiplanar, multisequence imaging of the lumbar spine was performed before and after gadolinium administration. .        IV Contrast:  8 mL of Gadobutrol injection (SINGLE-DOSE)     IMAGE QUALITY:  Diagnostic     FINDINGS:  DISCITIS FINDINGS: There is progressive disc height loss at L2-L3 with a vacuum disc now noted. The disc and vertebral body edema have also improved. Epidural phlegmon seen previously has resolved in the interim. Paravertebral inflammatory   change/enhancement appears similar. No paravertebral abscess seen.     VERTEBRAL BODIES:  There are 5 lumbar type vertebral bodies.  Normal alignment of the lumbar spine.  No spondylolysis or spondylolisthesis. No scoliosis.  No compression fracture.    Degenerative endplate marrow changes are present.     SACRUM:  Normal signal within the sacrum. No evidence of insufficiency or stress fracture.     DISTAL CORD AND CONUS:  Normal size and signal within the distal cord and conus.     PARASPINAL SOFT TISSUES:  Paraspinal soft tissues are unremarkable.     LOWER THORACIC DISC SPACES:  Normal disc height and signal.  No disc herniation, canal stenosis or foraminal narrowing.     LUMBAR DISC SPACES:     L1-L2: Disc bulge and mild facet arthropathy are unchanged. There is no stenosis.     L2-L3: Disc bulge and mild facet arthropathy. There is no stenosis.     L3-L4: Disc bulge without central stenosis. Left subarticular/foraminal protrusion results in mild left subarticular and foraminal stenosis. Facet arthropathy is mild. There is a prominent anterior annular fissure. Left lateral annular fissure is also   noted.     L4-L5: Disc bulge and mild facet arthropathy without stenosis.     L5-S1: Severe disc height loss with Modic type II endplate change and small disc bulge. Facet arthropathy is mild. Disc bulge and marginal osteophytes mildly efface the foramen.     POSTCONTRAST IMAGING:  No abnormal enhancement.     OTHER FINDINGS:  None.      IMPRESSION:  Expected evolution of findings of discitis osteomyelitis at L2-3 as discussed above. There is improved endplate edema and enhancement with resolution of the epidural phlegmon seen previously. Paravertebral inflammatory changes appear similar in the   interim.       Telemedicine consent    Patient: Capo Pepper  Provider: Peter Arthur PA-C  Provider located at [facility]  [address] [address] [phone]    The patient was identified by name and date of birth. Capo Mendezelygareth was informed that this is a telemedicine visit and that the visit is being conducted through the Epic Embedded platform. He agrees to proceed..  My office door was closed. No one else was in the room.  He acknowledged consent and understanding of privacy and security of the video platform. The patient has agreed to participate and understands they can discontinue the visit at any time.    Patient is aware this is a billable service.     I spent 15 minutes with the patient during this visit. Additional time spent on chart/lab review, documentation, care coordination and order placement.

## 2024-09-12 ENCOUNTER — HOME CARE VISIT (OUTPATIENT)
Dept: HOME HEALTH SERVICES | Facility: HOME HEALTHCARE | Age: 74
End: 2024-09-12
Payer: MEDICARE

## 2024-09-12 VITALS
DIASTOLIC BLOOD PRESSURE: 70 MMHG | TEMPERATURE: 97.2 F | OXYGEN SATURATION: 98 % | SYSTOLIC BLOOD PRESSURE: 120 MMHG | HEART RATE: 70 BPM | RESPIRATION RATE: 16 BRPM

## 2024-09-12 PROCEDURE — G0299 HHS/HOSPICE OF RN EA 15 MIN: HCPCS

## 2024-09-16 ENCOUNTER — HOME CARE VISIT (OUTPATIENT)
Dept: HOME HEALTH SERVICES | Facility: HOME HEALTHCARE | Age: 74
End: 2024-09-16
Payer: MEDICARE

## 2024-09-16 VITALS
HEART RATE: 70 BPM | OXYGEN SATURATION: 99 % | TEMPERATURE: 97.3 F | RESPIRATION RATE: 20 BRPM | SYSTOLIC BLOOD PRESSURE: 118 MMHG | DIASTOLIC BLOOD PRESSURE: 66 MMHG

## 2024-09-16 PROCEDURE — G0299 HHS/HOSPICE OF RN EA 15 MIN: HCPCS

## 2024-10-09 ENCOUNTER — OFFICE VISIT (OUTPATIENT)
Dept: OBGYN CLINIC | Facility: MEDICAL CENTER | Age: 74
End: 2024-10-09
Payer: MEDICARE

## 2024-10-09 VITALS — BODY MASS INDEX: 26.92 KG/M2 | HEIGHT: 70 IN | WEIGHT: 188 LBS

## 2024-10-09 DIAGNOSIS — R26.9 GAIT ABNORMALITY: ICD-10-CM

## 2024-10-09 DIAGNOSIS — G89.29 CHRONIC MIDLINE LOW BACK PAIN WITHOUT SCIATICA: Primary | ICD-10-CM

## 2024-10-09 DIAGNOSIS — M54.50 CHRONIC MIDLINE LOW BACK PAIN WITHOUT SCIATICA: Primary | ICD-10-CM

## 2024-10-09 PROCEDURE — 99204 OFFICE O/P NEW MOD 45 MIN: CPT | Performed by: PHYSICAL MEDICINE & REHABILITATION

## 2024-10-09 NOTE — PROGRESS NOTES
"1. Chronic midline low back pain without sciatica  Ambulatory referral to Physical Therapy      2. Gait abnormality  Ambulatory referral to Physical Therapy        Orders Placed This Encounter   Procedures    Ambulatory referral to Physical Therapy        Impression:  Patient is here in follow up of right shoulder pain secondary to rotator cuff tear and bicipital tendinopathy/tear.  He was last evaluated for this in 2021 and was to follow-up with orthopedic surgery.  He has some pain but overall, he is doing well.    Today he also reports generalized weakness and gait abnormality from recent hospitalization.  He would benefit from formal PT to address his lumbar spine and gait abnormality.  I will see him back in 6 weeks if needed.     Imaging Studies (I personally reviewed images in PACS and report):  Right shoulder x-rays most recent to this encounter reviewed.  These images show moderate osteoarthritis of the acromioclavicular joint and the glenohumeral joint.  There is decreased acromial-humeral interval which could represent a complete rotator cuff tear.     Right shoulder MRI dated 7/12/2021:   “Full thickness partial width tear supraspinatus anterior half clinical zone and insertion.     Mild tendinosis posterior half supraspinatus and anterior infraspinatus insertion.     Moderate tendinosis and partial tearing intra-articular and proximal intra-articular long head biceps tendon.     Mild glenohumeral and acromioclavicular osteoarthritis.\"    No follow-ups on file.    Patient is in agreement with the above plan.    HPI:  Capo Pepper is a 74 y.o. male  who presents in follow up.  Here for   Chief Complaint   Patient presents with    Right Shoulder - Pain       Since last visit: See above.    Following history reviewed and updated:  Past Medical History:   Diagnosis Date    Atypical chest pain     Back pain     CHF (congestive heart failure) (HCC)     Constipation 06/01/2024    Diabetes mellitus (HCC)     " "Legionnaire's disease (HCC)     Leukocytosis 2024    Lung disorder     Migraine     PONV (postoperative nausea and vomiting)      Past Surgical History:   Procedure Laterality Date    FOOT SURGERY Bilateral     plantar fasciitis    IR OTHER  2024    OTHER SURGICAL HISTORY      left arm    ROTATOR CUFF REPAIR Bilateral     TONSILLECTOMY      TRIGGER FINGER RELEASE       Social History   Social History     Substance and Sexual Activity   Alcohol Use Yes    Comment: occ     Social History     Substance and Sexual Activity   Drug Use Not Currently    Comment: CBD Oil pt stated no longer using     Social History     Tobacco Use   Smoking Status Former    Current packs/day: 0.00    Average packs/day: 1 pack/day for 25.0 years (25.0 ttl pk-yrs)    Types: Cigarettes, Cigars    Start date:     Quit date:     Years since quittin.7   Smokeless Tobacco Never     History reviewed. No pertinent family history.  Allergies   Allergen Reactions    Poultry Meal - Food Allergy Vomiting        Constitutional:  Ht 5' 10\" (1.778 m)   Wt 85.3 kg (188 lb)   BMI 26.98 kg/m²    General: NAD.  Eyes: Clear sclerae.  ENT: No inflammation, lesion, or mass of lips.  No tracheal deviation.  Musculoskeletal: As mentioned below.  Integumentary: No visible rashes or skin lesions.  Pulmonary/Chest: Effort normal. No respiratory distress.   Neuro: CN's grossly intact, BROWN.  Psych: Normal affect and judgement.  Vascular: WWP.    Back Exam     Other   Erythema: no back redness  Scars: absent    Comments:  Uses straight cane for ambulation assistance.      Right Shoulder Exam     Other   Erythema: absent  Scars: absent  Sensation: normal  Pulse: present             Procedures  "

## 2024-10-17 ENCOUNTER — EVALUATION (OUTPATIENT)
Dept: PHYSICAL THERAPY | Facility: MEDICAL CENTER | Age: 74
End: 2024-10-17
Payer: MEDICARE

## 2024-10-17 DIAGNOSIS — M25.512 CHRONIC LEFT SHOULDER PAIN: ICD-10-CM

## 2024-10-17 DIAGNOSIS — R53.81 PHYSICAL DECONDITIONING: ICD-10-CM

## 2024-10-17 DIAGNOSIS — M54.50 CHRONIC BILATERAL LOW BACK PAIN WITHOUT SCIATICA: ICD-10-CM

## 2024-10-17 DIAGNOSIS — M25.511 CHRONIC RIGHT SHOULDER PAIN: Primary | ICD-10-CM

## 2024-10-17 DIAGNOSIS — G89.29 CHRONIC BILATERAL LOW BACK PAIN WITHOUT SCIATICA: ICD-10-CM

## 2024-10-17 DIAGNOSIS — G89.29 CHRONIC LEFT SHOULDER PAIN: ICD-10-CM

## 2024-10-17 DIAGNOSIS — G89.29 CHRONIC RIGHT SHOULDER PAIN: Primary | ICD-10-CM

## 2024-10-17 PROCEDURE — 97162 PT EVAL MOD COMPLEX 30 MIN: CPT | Performed by: PHYSICAL MEDICINE & REHABILITATION

## 2024-10-17 NOTE — PROGRESS NOTES
PT Evaluation     Today's date: 10/17/2024  Patient name: Capo Pepper  : 1950  MRN: [id number]  Referring provider: Amanda Zavaleta DO  Dx:   Encounter Diagnosis     ICD-10-CM    1. Chronic right shoulder pain  M25.511     G89.29       2. Chronic left shoulder pain  M25.512     G89.29       3. Physical deconditioning  R53.81       4. Chronic bilateral low back pain without sciatica  M54.50     G89.29                      Assessment  Impairments: abnormal coordination, abnormal or restricted ROM, activity intolerance, impaired physical strength, pain with function and activity limitations    Assessment details: Pt presents to outpatient physical therapy with pain, decreased range of motion, and decreased strength all leading to limited activity tolerance. Unable to determine specific functional deficits secondary to deferred dynamic testing. Assess as able. Pt is a good candidate for outpatient physical therapy and would benefit from skilled intervention to address limitations and achieve goals, ensure safe return to preferred activity. Thank you for this referral.   Understanding of Dx/Px/POC: good     Prognosis: good    Goals  ST. Patient will report 25% decrease in pain in 4 weeks.  2. Patient will demonstrate 25% improvement in ROM in 4 weeks.  3. Patient will demonstrate 1/2 grade improvement in strength in 4 weeks.    LT. Patient will be able to perform IADLS without restriction or pain by discharge.  2. Patient will be independent in HEP by discharge.  3. Patient will be able to return to recreational/work duties without restriction or pain by discharge.      Plan  Patient would benefit from: PT eval and skilled PT  Planned modality interventions: cryotherapy and thermotherapy: hydrocollator packs    Planned therapy interventions: IADL retraining, body mechanics training, flexibility, functional ROM exercises, home exercise program, neuromuscular re-education, manual therapy, postural  training, strengthening, stretching, therapeutic activities, therapeutic exercise and joint mobilization    Frequency: 2x week  Duration in weeks: 6  Treatment plan discussed with: patient      Subjective Evaluation    History of Present Illness  Mechanism of injury: Patient presents with c/o LBP, shoulder pain, peripheral neuropathy. Hospitalized in June with spinal infection and abscess. Currently patient reports shoulder pain is main limitation. Pain in B feet is reported as limiting factor for ambulation distance. Patient reports 40# weight loss through this process. Patient notes intermittent brace use for pain control, has not been as helpful lately. No specific aggravating factors reported concerning back.   Patient is RHD. Unable to lie supine.     PMH: CVA, h/o bacterial infection, RTC tear, peripheral neuropathy    Pain   Shoulder: limited overall functional use of RUE. MRI (+)for multiple tendon disruptions. LUE with symptoms as well, not as limiting as R side.   Patient Goals  Patient goals for therapy: decreased pain        Objective     Active Range of Motion     Right Shoulder   Flexion: 135 degrees   Abduction: 115 degrees   External rotation BTH: Active external rotation behind the head: occiput.   Internal rotation BTB: Active internal rotation behind the back: L1.     Strength/Myotome Testing     Left Shoulder     Planes of Motion   Flexion: 4+   Abduction: 4   External rotation at 0°: 4   Internal rotation at 0°: 5     Right Shoulder     Planes of Motion   Flexion: 4+   Abduction: 4   External rotation at 0°: 4-   Internal rotation at 0°: 5     Additional Strength Details  Pain with resisted testing in abduction, flexion, ER on R      Functional Assessment        Comments  Patient defers functional testing secondary to B foot pain      General Comments:      Shoulder Comments   L scap static and dynamic winging  Palpable loss of supraspinatus bulk  TTP proximal bicep, deltoid insertion  Patient  reports swelling in B feet, worse end of day, unable to visualize       Precautions: h/o CVA, spinal infection, peripheral neuropathy, RTC dysfunction, unable to lie supine    Manuals                                                                 Neuro Re-Ed             Scap squeeze                                       Balance activity as tolerated                                                    Ther Ex             Table slide             Pulleys             Finger ladder                          Trial bike?             Seated HR/TR             LAQ                          Ther Activity                                       Gait Training                                       Modalities

## 2024-10-23 ENCOUNTER — TELEPHONE (OUTPATIENT)
Dept: FAMILY MEDICINE CLINIC | Facility: CLINIC | Age: 74
End: 2024-10-23

## 2024-10-23 NOTE — TELEPHONE ENCOUNTER
----- Message from Idalmis MG sent at 10/22/2024 10:19 AM EDT -----  Regarding: awv  Patient due for AWV, please contact patient to schedule.

## 2024-10-24 ENCOUNTER — OFFICE VISIT (OUTPATIENT)
Dept: PHYSICAL THERAPY | Facility: MEDICAL CENTER | Age: 74
End: 2024-10-24
Payer: MEDICARE

## 2024-10-24 DIAGNOSIS — M25.512 CHRONIC LEFT SHOULDER PAIN: ICD-10-CM

## 2024-10-24 DIAGNOSIS — M54.50 CHRONIC BILATERAL LOW BACK PAIN WITHOUT SCIATICA: ICD-10-CM

## 2024-10-24 DIAGNOSIS — G89.29 CHRONIC BILATERAL LOW BACK PAIN WITHOUT SCIATICA: ICD-10-CM

## 2024-10-24 DIAGNOSIS — G89.29 CHRONIC LEFT SHOULDER PAIN: ICD-10-CM

## 2024-10-24 DIAGNOSIS — G89.29 CHRONIC RIGHT SHOULDER PAIN: Primary | ICD-10-CM

## 2024-10-24 DIAGNOSIS — M25.511 CHRONIC RIGHT SHOULDER PAIN: Primary | ICD-10-CM

## 2024-10-24 DIAGNOSIS — R53.81 PHYSICAL DECONDITIONING: ICD-10-CM

## 2024-10-24 PROCEDURE — 97110 THERAPEUTIC EXERCISES: CPT

## 2024-10-24 PROCEDURE — 97112 NEUROMUSCULAR REEDUCATION: CPT

## 2024-10-24 NOTE — PROGRESS NOTES
"Daily Note     Today's date: 10/24/2024  Patient name: Capo Pepper  : 1950  MRN: [id number]  Referring provider: Amanda Zavaleta DO  Dx:   Encounter Diagnosis     ICD-10-CM    1. Chronic right shoulder pain  M25.511     G89.29       2. Chronic left shoulder pain  M25.512     G89.29       3. Physical deconditioning  R53.81       4. Chronic bilateral low back pain without sciatica  M54.50     G89.29                      Subjective: Pt reports that he is not feeling the best noting that his conditioning has been bad. Notes no new changes since his IE.       Objective: See treatment diary below      Assessment: Tolerated treatment fair. Began session on the bike as an active warmup with no resistance. Performed all exercises to tolerance this session with frequent rest breaks needed. Poor tolerance with sitting today therefore a short time was present with the pulleys. All other exercises were performed standing. Patient demonstrated fatigue post treatment, exhibited good technique with therapeutic exercises, and would benefit from continued PT      Plan: Continue per plan of care.      Precautions: h/o CVA, spinal infection, peripheral neuropathy, RTC dysfunction, unable to lie supine    Manuals 10/24                                                                Neuro Re-Ed             Scap squeeze 5\"x20            Step ups to foam Fwd/lat 10x b/l            Tandem stance 20\"x3 ea b/l            Balance activity as tolerated                                                    Ther Ex             Table slide             Pulleys 2' p!            Finger ladder 10x flex                         Trial bike 5' L0            Stand HR/TR 20x ea            LAQ             TKE RTB 20x b/l            Stand HS curls 20x ea            Ther Activity                                       Gait Training                                       Modalities                                            "

## 2024-10-28 ENCOUNTER — APPOINTMENT (OUTPATIENT)
Dept: PHYSICAL THERAPY | Facility: MEDICAL CENTER | Age: 74
End: 2024-10-28
Payer: MEDICARE

## 2024-10-31 ENCOUNTER — APPOINTMENT (OUTPATIENT)
Dept: PHYSICAL THERAPY | Facility: MEDICAL CENTER | Age: 74
End: 2024-10-31
Payer: MEDICARE

## 2024-11-20 ENCOUNTER — OFFICE VISIT (OUTPATIENT)
Dept: OBGYN CLINIC | Facility: MEDICAL CENTER | Age: 74
End: 2024-11-20
Payer: MEDICARE

## 2024-11-20 VITALS
HEART RATE: 81 BPM | DIASTOLIC BLOOD PRESSURE: 77 MMHG | BODY MASS INDEX: 26.92 KG/M2 | WEIGHT: 188 LBS | SYSTOLIC BLOOD PRESSURE: 116 MMHG | HEIGHT: 70 IN

## 2024-11-20 DIAGNOSIS — G89.29 CHRONIC MIDLINE LOW BACK PAIN WITHOUT SCIATICA: ICD-10-CM

## 2024-11-20 DIAGNOSIS — M75.121 NONTRAUMATIC COMPLETE TEAR OF RIGHT ROTATOR CUFF: Primary | ICD-10-CM

## 2024-11-20 DIAGNOSIS — M54.50 CHRONIC MIDLINE LOW BACK PAIN WITHOUT SCIATICA: ICD-10-CM

## 2024-11-20 PROCEDURE — 99214 OFFICE O/P EST MOD 30 MIN: CPT | Performed by: PHYSICAL MEDICINE & REHABILITATION

## 2024-11-20 NOTE — PROGRESS NOTES
"1. Nontraumatic complete tear of right rotator cuff        2. Chronic midline low back pain without sciatica          No orders of the defined types were placed in this encounter.     Impression:  Patient is here in follow up of right shoulder pain secondary to rotator cuff tear and bicipital tendinopathy/tear.  He has been going through physical therapy and the shoulder is feeling well.    Capo also presents in follow-up of chronic low back pain.  He was treating for osteomyelitis and discitis.  His recent MRI with and without contrast shows interval resolution of this. He would benefit from continued physical therapy.  He can also take Tylenol.  She will continue to be as active as tolerated.  He will try natural anti-inflammatories that we discussed.  He wants to try to treat this naturally.  Can consider having him restart physical therapy at a different facility or with a different physical therapist.    Imaging Studies (I personally reviewed images in PACS and report):  MRI lumbar spine with and without contrast from 9/2024:  \"DISCITIS FINDINGS: There is progressive disc height loss at L2-L3 with a vacuum disc now noted. The disc and vertebral body edema have also improved. Epidural phlegmon seen previously has resolved in the interim. Paravertebral inflammatory   change/enhancement appears similar. No paravertebral abscess seen.     VERTEBRAL BODIES:  There are 5 lumbar type vertebral bodies.  Normal alignment of the lumbar spine.  No spondylolysis or spondylolisthesis. No scoliosis.  No compression fracture.    Degenerative endplate marrow changes are present.     SACRUM:  Normal signal within the sacrum. No evidence of insufficiency or stress fracture.     DISTAL CORD AND CONUS:  Normal size and signal within the distal cord and conus.     PARASPINAL SOFT TISSUES:  Paraspinal soft tissues are unremarkable.     LOWER THORACIC DISC SPACES:  Normal disc height and signal.  No disc herniation, canal stenosis " "or foraminal narrowing.     LUMBAR DISC SPACES:     L1-L2: Disc bulge and mild facet arthropathy are unchanged. There is no stenosis.     L2-L3: Disc bulge and mild facet arthropathy. There is no stenosis.     L3-L4: Disc bulge without central stenosis. Left subarticular/foraminal protrusion results in mild left subarticular and foraminal stenosis. Facet arthropathy is mild. There is a prominent anterior annular fissure. Left lateral annular fissure is also   noted.     L4-L5: Disc bulge and mild facet arthropathy without stenosis.     L5-S1: Severe disc height loss with Modic type II endplate change and small disc bulge. Facet arthropathy is mild. Disc bulge and marginal osteophytes mildly efface the foramen.     POSTCONTRAST IMAGING:  No abnormal enhancement.     OTHER FINDINGS:  None.     IMPRESSION:  Expected evolution of findings of discitis osteomyelitis at L2-3 as discussed above. There is improved endplate edema and enhancement with resolution of the epidural phlegmon seen previously. Paravertebral inflammatory changes appear similar in the   interim.\"    Right shoulder x-rays most recent to this encounter reviewed.  These images show moderate osteoarthritis of the acromioclavicular joint and the glenohumeral joint.  There is decreased acromial-humeral interval which could represent a complete rotator cuff tear.     Right shoulder MRI dated 7/12/2021:   “Full thickness partial width tear supraspinatus anterior half clinical zone and insertion.     Mild tendinosis posterior half supraspinatus and anterior infraspinatus insertion.     Moderate tendinosis and partial tearing intra-articular and proximal intra-articular long head biceps tendon.     Mild glenohumeral and acromioclavicular osteoarthritis.\"    Return in about 5 weeks (around 12/25/2024).    Patient is in agreement with the above plan.    HPI:  Capo Pepper is a 74 y.o. male  who presents in follow up.  Here for   Chief Complaint   Patient " "presents with    Right Shoulder - Follow-up, Pain     Pt reports right shoulder is feeling better since last visit    Lower Back - Pain, Follow-up     Pt reports worsening lower back pain since last visit       Since last visit: See above.    Following history reviewed and updated:  Past Medical History:   Diagnosis Date    Atypical chest pain     Back pain     CHF (congestive heart failure) (HCC)     Constipation 2024    Diabetes mellitus (HCC)     Legionnaire's disease (HCC)     Leukocytosis 2024    Lung disorder     Migraine     PONV (postoperative nausea and vomiting)      Past Surgical History:   Procedure Laterality Date    FOOT SURGERY Bilateral     plantar fasciitis    IR OTHER  2024    OTHER SURGICAL HISTORY      left arm    ROTATOR CUFF REPAIR Bilateral     TONSILLECTOMY      TRIGGER FINGER RELEASE       Social History   Social History     Substance and Sexual Activity   Alcohol Use Yes    Comment: occ     Social History     Substance and Sexual Activity   Drug Use Not Currently    Comment: CBD Oil pt stated no longer using     Social History     Tobacco Use   Smoking Status Former    Current packs/day: 0.00    Average packs/day: 1 pack/day for 25.0 years (25.0 ttl pk-yrs)    Types: Cigarettes, Cigars    Start date:     Quit date:     Years since quittin.9   Smokeless Tobacco Never     History reviewed. No pertinent family history.  Allergies   Allergen Reactions    Poultry Meal - Food Allergy Vomiting        Constitutional:  /77   Pulse 81   Ht 5' 10\" (1.778 m)   Wt 85.3 kg (188 lb)   BMI 26.98 kg/m²    General: NAD.  Eyes: Clear sclerae.  ENT: No inflammation, lesion, or mass of lips.  No tracheal deviation.  Musculoskeletal: As mentioned below.  Integumentary: No visible rashes or skin lesions.  Pulmonary/Chest: Effort normal. No respiratory distress.   Neuro: CN's grossly intact, BROWN.  Psych: Normal affect and judgement.  Vascular: WWP.    Back Exam     Muscle " Strength   The patient has normal back strength.    Other   Sensation: normal  Erythema: no back redness  Scars: absent      Right Shoulder Exam     Tenderness   The patient is experiencing tenderness in the acromion.    Tests   Aldrich test: positive  Impingement: positive    Other   Erythema: absent  Scars: absent  Sensation: normal  Pulse: present             Procedures

## 2024-11-20 NOTE — PATIENT INSTRUCTIONS
You can try turmeric and tart cherry as natural anti-inflammatories.    Continue to be as active as tolerated.    Make sure you are hydrating.

## 2024-12-06 ENCOUNTER — TELEPHONE (OUTPATIENT)
Dept: FAMILY MEDICINE CLINIC | Facility: CLINIC | Age: 74
End: 2024-12-06

## 2024-12-06 NOTE — TELEPHONE ENCOUNTER
----- Message from Idalmis MG sent at 12/4/2024 11:51 AM EST -----  Regarding: awv  Patient due for AWV, please contact patient to schedule.

## 2024-12-06 NOTE — TELEPHONE ENCOUNTER
Patient saw a doctor in [place] and had his AWV  that doctor will be retiring soon so he may be seeing DR Le again or someone local.

## 2025-01-22 ENCOUNTER — OFFICE VISIT (OUTPATIENT)
Dept: OBGYN CLINIC | Facility: MEDICAL CENTER | Age: 75
End: 2025-01-22
Payer: MEDICARE

## 2025-01-22 DIAGNOSIS — M75.121 NONTRAUMATIC COMPLETE TEAR OF RIGHT ROTATOR CUFF: Primary | ICD-10-CM

## 2025-01-22 DIAGNOSIS — M54.50 CHRONIC MIDLINE LOW BACK PAIN WITHOUT SCIATICA: ICD-10-CM

## 2025-01-22 DIAGNOSIS — G89.29 CHRONIC MIDLINE LOW BACK PAIN WITHOUT SCIATICA: ICD-10-CM

## 2025-01-22 PROCEDURE — 20610 DRAIN/INJ JOINT/BURSA W/O US: CPT | Performed by: PHYSICAL MEDICINE & REHABILITATION

## 2025-01-22 PROCEDURE — 99214 OFFICE O/P EST MOD 30 MIN: CPT | Performed by: PHYSICAL MEDICINE & REHABILITATION

## 2025-01-22 RX ORDER — TRIAMCINOLONE ACETONIDE 40 MG/ML
80 INJECTION, SUSPENSION INTRA-ARTICULAR; INTRAMUSCULAR
Status: COMPLETED | OUTPATIENT
Start: 2025-01-22 | End: 2025-01-22

## 2025-01-22 RX ORDER — ROPIVACAINE HYDROCHLORIDE 5 MG/ML
10 INJECTION, SOLUTION EPIDURAL; INFILTRATION; PERINEURAL
Status: COMPLETED | OUTPATIENT
Start: 2025-01-22 | End: 2025-01-22

## 2025-01-22 RX ADMIN — TRIAMCINOLONE ACETONIDE 80 MG: 40 INJECTION, SUSPENSION INTRA-ARTICULAR; INTRAMUSCULAR at 14:30

## 2025-01-22 RX ADMIN — ROPIVACAINE HYDROCHLORIDE 10 ML: 5 INJECTION, SOLUTION EPIDURAL; INFILTRATION; PERINEURAL at 14:30

## 2025-01-22 NOTE — PROGRESS NOTES
"1. Nontraumatic complete tear of right rotator cuff        2. Chronic midline low back pain without sciatica          No orders of the defined types were placed in this encounter.       Impression:  Patient is here in follow up of right shoulder pain secondary to rotator cuff tear and bicipital tendinopathy/tear.  Treatment has included physical therapy and home exercise program.  His MRI back in 2021 showed rotator cuff tears.  Today we proceeded with a subacromial space steroid injection.  We discussed that if he ended up needing surgery, would likely be reverse total shoulder.  He will monitor his blood sugars.  I will see him back in 3 months if needed.     Capo also presents in follow-up of chronic low back pain.  He was treating for osteomyelitis and discitis.  His recent MRI with and without contrast shows interval resolution of this. He would benefit from continued physical therapy.  He can also take Tylenol.  She will continue to be as active as tolerated.  He will try natural anti-inflammatories that we discussed.  He wants to try to treat this naturally.  Can consider having him restart physical therapy at a different facility or with a different physical therapist.  His back is feeling better today.     Imaging Studies (I personally reviewed images in PACS and report):  MRI lumbar spine with and without contrast from 9/2024:  \"DISCITIS FINDINGS: There is progressive disc height loss at L2-L3 with a vacuum disc now noted. The disc and vertebral body edema have also improved. Epidural phlegmon seen previously has resolved in the interim. Paravertebral inflammatory   change/enhancement appears similar. No paravertebral abscess seen.     VERTEBRAL BODIES:  There are 5 lumbar type vertebral bodies.  Normal alignment of the lumbar spine.  No spondylolysis or spondylolisthesis. No scoliosis.  No compression fracture.    Degenerative endplate marrow changes are present.     SACRUM:  Normal signal within the " "sacrum. No evidence of insufficiency or stress fracture.     DISTAL CORD AND CONUS:  Normal size and signal within the distal cord and conus.     PARASPINAL SOFT TISSUES:  Paraspinal soft tissues are unremarkable.     LOWER THORACIC DISC SPACES:  Normal disc height and signal.  No disc herniation, canal stenosis or foraminal narrowing.     LUMBAR DISC SPACES:     L1-L2: Disc bulge and mild facet arthropathy are unchanged. There is no stenosis.     L2-L3: Disc bulge and mild facet arthropathy. There is no stenosis.     L3-L4: Disc bulge without central stenosis. Left subarticular/foraminal protrusion results in mild left subarticular and foraminal stenosis. Facet arthropathy is mild. There is a prominent anterior annular fissure. Left lateral annular fissure is also   noted.     L4-L5: Disc bulge and mild facet arthropathy without stenosis.     L5-S1: Severe disc height loss with Modic type II endplate change and small disc bulge. Facet arthropathy is mild. Disc bulge and marginal osteophytes mildly efface the foramen.     POSTCONTRAST IMAGING:  No abnormal enhancement.     OTHER FINDINGS:  None.     IMPRESSION:  Expected evolution of findings of discitis osteomyelitis at L2-3 as discussed above. There is improved endplate edema and enhancement with resolution of the epidural phlegmon seen previously. Paravertebral inflammatory changes appear similar in the   interim.\"     Right shoulder x-rays most recent to this encounter reviewed.  These images show moderate osteoarthritis of the acromioclavicular joint and the glenohumeral joint.  There is decreased acromial-humeral interval which could represent a complete rotator cuff tear.     Right shoulder MRI dated 7/12/2021:   “Full thickness partial width tear supraspinatus anterior half clinical zone and insertion.     Mild tendinosis posterior half supraspinatus and anterior infraspinatus insertion.     Moderate tendinosis and partial tearing intra-articular and " "proximal intra-articular long head biceps tendon.     Mild glenohumeral and acromioclavicular osteoarthritis.\"    No follow-ups on file.    Patient is in agreement with the above plan.    HPI:  Capo Pepper is a 74 y.o. male  who presents in follow up.  Here for   Chief Complaint   Patient presents with    Right Shoulder - Follow-up, Pain    Lower Back - Follow-up, Pain       Since last visit: See above.    Following history reviewed and updated:  Past Medical History:   Diagnosis Date    Atypical chest pain     Back pain     CHF (congestive heart failure) (HCC)     Constipation 2024    Diabetes mellitus (HCC)     Legionnaire's disease (HCC)     Leukocytosis 2024    Lung disorder     Migraine     PONV (postoperative nausea and vomiting)      Past Surgical History:   Procedure Laterality Date    FOOT SURGERY Bilateral     plantar fasciitis    IR OTHER  2024    OTHER SURGICAL HISTORY      left arm    ROTATOR CUFF REPAIR Bilateral     TONSILLECTOMY      TRIGGER FINGER RELEASE       Social History   Social History     Substance and Sexual Activity   Alcohol Use Yes    Comment: occ     Social History     Substance and Sexual Activity   Drug Use Not Currently    Comment: CBD Oil pt stated no longer using     Social History     Tobacco Use   Smoking Status Former    Current packs/day: 0.00    Average packs/day: 1 pack/day for 25.0 years (25.0 ttl pk-yrs)    Types: Cigarettes, Cigars    Start date:     Quit date:     Years since quittin.0   Smokeless Tobacco Never     History reviewed. No pertinent family history.  Allergies   Allergen Reactions    Poultry Meal - Food Allergy Vomiting        Constitutional:  There were no vitals taken for this visit.   General: NAD.  Eyes: Clear sclerae.  ENT: No inflammation, lesion, or mass of lips.  No tracheal deviation.  Musculoskeletal: As mentioned below.  Integumentary: No visible rashes or skin lesions.  Pulmonary/Chest: Effort normal. No " respiratory distress.   Neuro: CN's grossly intact, BROWN.  Psych: Normal affect and judgement.  Vascular: WWP.    Back Exam     Tenderness   The patient is experiencing tenderness in the sacroiliac.    Muscle Strength   The patient has normal back strength.    Other   Sensation: normal  Erythema: no back redness  Scars: absent      Right Shoulder Exam     Tenderness   The patient is experiencing tenderness in the acromion.    Tests   Aldrich test: positive  Impingement: positive  Drop arm: positive    Other   Erythema: absent  Scars: absent  Sensation: normal  Pulse: present             Large joint arthrocentesis: R subacromial bursa  [place] Protocol:  procedure performed by consultantConsent: Verbal consent obtained. Written consent not obtained.  Risks and benefits: risks, benefits and alternatives were discussed  Consent given by: patient  Timeout called at: 1/22/2025 2:36 PM.  Patient understanding: patient states understanding of the procedure being performed  Site marked: the operative site was marked  Patient identity confirmed: verbally with patient  Supporting Documentation  Indications: pain   Procedure Details  Location: shoulder - R subacromial bursa  Needle gauge: 21G 2''  Ultrasound guidance: no  Approach: posterolateral  Medications administered: 80 mg triamcinolone acetonide 40 mg/mL; 10 mL ropivacaine 0.5 %    Patient tolerance: patient tolerated the procedure well with no immediate complications  Dressing:  Sterile dressing applied    There was little to no resistance encountered during the injection.    Risks of this procedure include:    - Risk of bleeding since a needle is involved.  - Risk of infection (1/10,000 chance as per recent studies).  Signs/symptoms were discussed and they would prompt an urgent evaluation at an emergency department.  - Risk of pigmentation or skin dimpling in the skin (2-3% chance as per recent studies) from the steroid.  - Risk of increased pain from steroid flare  (1% chance as per recent studies) that typically lasts 24-48 hours.  - Risk of increased blood sugars from the steroid medication that can last for a few weeks.  If the patient is a diabetic or pre-diabetic, they were encouraged to closely monitor their blood sugars and discuss with PCP if elevated more than usual or if having symptoms.    The benefits outweigh the risks and so the procedure was completed.

## 2025-01-24 ENCOUNTER — TELEPHONE (OUTPATIENT)
Dept: FAMILY MEDICINE CLINIC | Facility: CLINIC | Age: 75
End: 2025-01-24

## 2025-01-24 NOTE — TELEPHONE ENCOUNTER
Called pt to schedule diabetic follow up and diabetic eye exam. Pt declined to schedule at this time.

## 2025-01-24 NOTE — TELEPHONE ENCOUNTER
----- Message from Idalmis MG sent at 1/17/2025  2:06 PM EST -----  Regarding: APPOINTMENT  Patient is overdue for diabetic appt, please call to schedule and possible obtain DM eye exam information.

## 2025-02-19 ENCOUNTER — TELEPHONE (OUTPATIENT)
Dept: FAMILY MEDICINE CLINIC | Facility: CLINIC | Age: 75
End: 2025-02-19

## 2025-02-19 NOTE — TELEPHONE ENCOUNTER
----- Message from Idalmis MG sent at 2/19/2025 10:36 AM EST -----  Regarding: awv/dm eyte  Patient due for 6 month fup and AWV. Please call to schedule and also inquire if patient had a recent dm eye exam and optain report

## 2025-04-10 LAB — HBA1C MFR BLD HPLC: 6.2 %

## 2025-04-23 ENCOUNTER — OFFICE VISIT (OUTPATIENT)
Dept: OBGYN CLINIC | Facility: MEDICAL CENTER | Age: 75
End: 2025-04-23
Payer: MEDICARE

## 2025-04-23 DIAGNOSIS — G89.29 CHRONIC LEFT SHOULDER PAIN: ICD-10-CM

## 2025-04-23 DIAGNOSIS — M67.921 BICEPS TENDINOPATHY OF RIGHT UPPER EXTREMITY: Primary | ICD-10-CM

## 2025-04-23 DIAGNOSIS — M25.512 CHRONIC LEFT SHOULDER PAIN: ICD-10-CM

## 2025-04-23 PROCEDURE — 20610 DRAIN/INJ JOINT/BURSA W/O US: CPT | Performed by: PHYSICAL MEDICINE & REHABILITATION

## 2025-04-23 PROCEDURE — 99214 OFFICE O/P EST MOD 30 MIN: CPT | Performed by: PHYSICAL MEDICINE & REHABILITATION

## 2025-04-23 RX ORDER — TRIAMCINOLONE ACETONIDE 40 MG/ML
80 INJECTION, SUSPENSION INTRA-ARTICULAR; INTRAMUSCULAR
Status: COMPLETED | OUTPATIENT
Start: 2025-04-23 | End: 2025-04-23

## 2025-04-23 RX ORDER — ROPIVACAINE HYDROCHLORIDE 5 MG/ML
10 INJECTION, SOLUTION EPIDURAL; INFILTRATION; PERINEURAL
Status: COMPLETED | OUTPATIENT
Start: 2025-04-23 | End: 2025-04-23

## 2025-04-23 RX ADMIN — ROPIVACAINE HYDROCHLORIDE 10 ML: 5 INJECTION, SOLUTION EPIDURAL; INFILTRATION; PERINEURAL at 14:30

## 2025-04-23 RX ADMIN — TRIAMCINOLONE ACETONIDE 80 MG: 40 INJECTION, SUSPENSION INTRA-ARTICULAR; INTRAMUSCULAR at 14:30

## 2025-04-23 NOTE — PROGRESS NOTES
"1. Biceps tendinopathy of right upper extremity        2. Chronic left shoulder pain  Large joint arthrocentesis: L subacromial bursa        Orders Placed This Encounter   Procedures    Large joint arthrocentesis: L subacromial bursa        Impression:  Patient is here in follow up of right shoulder pain secondary to rotator cuff tear and bicipital tendinopathy/tear.  Treatment has included physical therapy and home exercise program.  His MRI back in 2021 showed rotator cuff tears.  Treatment has included subacromial space steroid injection with good relief.  We discussed that if he ended up needing surgery, would likely be reverse total shoulder.  He will monitor his blood sugars.     He also presents with chronic left shoulder pain likely secondary to the same.  We decided to proceed with a subacromial space steroid injection.  If continued symptoms, would consider obtaining updated left shoulder x-rays on follow-up visit.     \"Capo also presents in follow-up of chronic low back pain.  He was treating for osteomyelitis and discitis.  His recent MRI with and without contrast shows interval resolution of this. He would benefit from continued physical therapy.  He can also take Tylenol.  She will continue to be as active as tolerated.  He will try natural anti-inflammatories that we discussed.  He wants to try to treat this naturally.  Can consider having him restart physical therapy at a different facility or with a different physical therapist.  His back is feeling better today.\"- not assessed today.     Imaging Studies (I personally reviewed images in PACS and report):  MRI lumbar spine with and without contrast from 9/2024:  \"DISCITIS FINDINGS: There is progressive disc height loss at L2-L3 with a vacuum disc now noted. The disc and vertebral body edema have also improved. Epidural phlegmon seen previously has resolved in the interim. Paravertebral inflammatory   change/enhancement appears similar. No " "paravertebral abscess seen.     VERTEBRAL BODIES:  There are 5 lumbar type vertebral bodies.  Normal alignment of the lumbar spine.  No spondylolysis or spondylolisthesis. No scoliosis.  No compression fracture.    Degenerative endplate marrow changes are present.     SACRUM:  Normal signal within the sacrum. No evidence of insufficiency or stress fracture.     DISTAL CORD AND CONUS:  Normal size and signal within the distal cord and conus.     PARASPINAL SOFT TISSUES:  Paraspinal soft tissues are unremarkable.     LOWER THORACIC DISC SPACES:  Normal disc height and signal.  No disc herniation, canal stenosis or foraminal narrowing.     LUMBAR DISC SPACES:     L1-L2: Disc bulge and mild facet arthropathy are unchanged. There is no stenosis.     L2-L3: Disc bulge and mild facet arthropathy. There is no stenosis.     L3-L4: Disc bulge without central stenosis. Left subarticular/foraminal protrusion results in mild left subarticular and foraminal stenosis. Facet arthropathy is mild. There is a prominent anterior annular fissure. Left lateral annular fissure is also   noted.     L4-L5: Disc bulge and mild facet arthropathy without stenosis.     L5-S1: Severe disc height loss with Modic type II endplate change and small disc bulge. Facet arthropathy is mild. Disc bulge and marginal osteophytes mildly efface the foramen.     POSTCONTRAST IMAGING:  No abnormal enhancement.     OTHER FINDINGS:  None.     IMPRESSION:  Expected evolution of findings of discitis osteomyelitis at L2-3 as discussed above. There is improved endplate edema and enhancement with resolution of the epidural phlegmon seen previously. Paravertebral inflammatory changes appear similar in the   interim.\"     Right shoulder x-rays most recent to this encounter reviewed.  These images show moderate osteoarthritis of the acromioclavicular joint and the glenohumeral joint.  There is decreased acromial-humeral interval which could represent a complete rotator " "cuff tear.     Right shoulder MRI dated 2021:   “Full thickness partial width tear supraspinatus anterior half clinical zone and insertion.     Mild tendinosis posterior half supraspinatus and anterior infraspinatus insertion.     Moderate tendinosis and partial tearing intra-articular and proximal intra-articular long head biceps tendon.     Mild glenohumeral and acromioclavicular osteoarthritis.\"    No follow-ups on file.    Patient is in agreement with the above plan.    HPI:  Capo Pepper is a 74 y.o. male  who presents in follow up.  Here for   Chief Complaint   Patient presents with    Right Shoulder - Follow-up, Pain       Since last visit: See above.    Following history reviewed and updated:  Past Medical History:   Diagnosis Date    Atypical chest pain     Back pain     CHF (congestive heart failure) (HCC)     Constipation 2024    Diabetes mellitus (HCC)     Legionnaire's disease (HCC)     Leukocytosis 2024    Lung disorder     Migraine     PONV (postoperative nausea and vomiting)      Past Surgical History:   Procedure Laterality Date    FOOT SURGERY Bilateral     plantar fasciitis    IR OTHER  2024    OTHER SURGICAL HISTORY      left arm    ROTATOR CUFF REPAIR Bilateral     TONSILLECTOMY      TRIGGER FINGER RELEASE       Social History   Social History     Substance and Sexual Activity   Alcohol Use Yes    Comment: occ     Social History     Substance and Sexual Activity   Drug Use Not Currently    Comment: CBD Oil pt stated no longer using     Social History     Tobacco Use   Smoking Status Former    Current packs/day: 0.00    Average packs/day: 1 pack/day for 25.0 years (25.0 ttl pk-yrs)    Types: Cigarettes, Cigars    Start date:     Quit date: 2000    Years since quittin.3   Smokeless Tobacco Never     History reviewed. No pertinent family history.  Allergies   Allergen Reactions    Poultry Meal - Food Allergy Vomiting        Constitutional:  There were no vitals " taken for this visit.   General: NAD.  Eyes: Clear sclerae.  ENT: No inflammation, lesion, or mass of lips.  No tracheal deviation.  Musculoskeletal: As mentioned below.  Integumentary: No visible rashes or skin lesions.  Pulmonary/Chest: Effort normal. No respiratory distress.   Neuro: CN's grossly intact, BROWN.  Psych: Normal affect and judgement.  Vascular: WWP.    Left Shoulder Exam     Tenderness   The patient is experiencing tenderness in the acromion and biceps tendon.    Range of Motion   Active abduction:  normal   Passive abduction:  normal   Forward flexion:  normal     Tests   Aldrich test: positive  Impingement: positive    Other   Erythema: absent  Scars: absent  Sensation: normal  Pulse: present              Large joint arthrocentesis: L subacromial bursa  [place] Protocol:  procedure performed by consultantConsent: Verbal consent obtained. Written consent not obtained.  Risks and benefits: risks, benefits and alternatives were discussed  Consent given by: patient  Timeout called at: 4/23/2025 2:46 PM.  Patient understanding: patient states understanding of the procedure being performed  Site marked: the operative site was marked  Patient identity confirmed: verbally with patient  Supporting Documentation  Indications: pain     Is this a Visco injection? NoProcedure Details  Location: shoulder - L subacromial bursa  Needle gauge: 21G 2''  Ultrasound guidance: no  Approach: posterolateral  Medications administered: 80 mg triamcinolone acetonide 40 mg/mL; 10 mL ropivacaine 0.5 %    Patient tolerance: patient tolerated the procedure well with no immediate complications  Dressing:  Sterile dressing applied    There was little to no resistance encountered during the injection.    Risks of this procedure include:    - Risk of bleeding since a needle is involved.  - Risk of infection (1/10,000 chance as per recent studies).  Signs/symptoms were discussed and they would prompt an urgent evaluation at an  emergency department.  - Risk of pigmentation or skin dimpling in the skin (2-3% chance as per recent studies) from the steroid.  - Risk of increased pain from steroid flare (1% chance as per recent studies) that typically lasts 24-48 hours.  - Risk of increased blood sugars from the steroid medication that can last for a few weeks.  If the patient is a diabetic or pre-diabetic, they were encouraged to closely monitor their blood sugars and discuss with PCP if elevated more than usual or if having symptoms.    The benefits outweigh the risks and so the procedure was completed.

## 2025-05-15 ENCOUNTER — APPOINTMENT (OUTPATIENT)
Dept: RADIOLOGY | Facility: MEDICAL CENTER | Age: 75
End: 2025-05-15
Attending: PHYSICAL MEDICINE & REHABILITATION
Payer: MEDICARE

## 2025-05-15 ENCOUNTER — OFFICE VISIT (OUTPATIENT)
Dept: OBGYN CLINIC | Facility: MEDICAL CENTER | Age: 75
End: 2025-05-15
Payer: MEDICARE

## 2025-05-15 VITALS — WEIGHT: 188 LBS | HEIGHT: 70 IN | BODY MASS INDEX: 26.92 KG/M2

## 2025-05-15 DIAGNOSIS — G89.29 CHRONIC LEFT SHOULDER PAIN: ICD-10-CM

## 2025-05-15 DIAGNOSIS — M25.512 CHRONIC LEFT SHOULDER PAIN: ICD-10-CM

## 2025-05-15 DIAGNOSIS — M25.512 CHRONIC LEFT SHOULDER PAIN: Primary | ICD-10-CM

## 2025-05-15 DIAGNOSIS — G89.29 CHRONIC LEFT SHOULDER PAIN: Primary | ICD-10-CM

## 2025-05-15 PROCEDURE — 20610 DRAIN/INJ JOINT/BURSA W/O US: CPT | Performed by: PHYSICAL MEDICINE & REHABILITATION

## 2025-05-15 PROCEDURE — 73030 X-RAY EXAM OF SHOULDER: CPT

## 2025-05-15 PROCEDURE — 99213 OFFICE O/P EST LOW 20 MIN: CPT | Performed by: PHYSICAL MEDICINE & REHABILITATION

## 2025-05-15 RX ORDER — TRIAMCINOLONE ACETONIDE 40 MG/ML
80 INJECTION, SUSPENSION INTRA-ARTICULAR; INTRAMUSCULAR
Status: COMPLETED | OUTPATIENT
Start: 2025-05-15 | End: 2025-05-15

## 2025-05-15 RX ORDER — ROPIVACAINE HYDROCHLORIDE 5 MG/ML
10 INJECTION, SOLUTION EPIDURAL; INFILTRATION; PERINEURAL
Status: COMPLETED | OUTPATIENT
Start: 2025-05-15 | End: 2025-05-15

## 2025-05-15 RX ADMIN — TRIAMCINOLONE ACETONIDE 80 MG: 40 INJECTION, SUSPENSION INTRA-ARTICULAR; INTRAMUSCULAR at 14:30

## 2025-05-15 RX ADMIN — ROPIVACAINE HYDROCHLORIDE 10 ML: 5 INJECTION, SOLUTION EPIDURAL; INFILTRATION; PERINEURAL at 14:30

## 2025-05-15 NOTE — ASSESSMENT & PLAN NOTE
Patient is here in follow up of bilateral shoulder pain secondary to chronic/complete rotator cuff tear and bicipital tendinopathy/tear.  Treatment has included bilateral subacromial space steroid injections.  Today we proceeded with a left glenohumeral joint steroid injection after obtaining x-rays due to a fall.  Would benefit from formal physical therapy.  Will see him back in 8 weeks if needed.  Orders:    XR shoulder 2+ vw left; Future    Ambulatory referral to Physical Therapy; Future

## 2025-05-15 NOTE — PROGRESS NOTES
"Assessment & Plan  Chronic left shoulder pain  Patient is here in follow up of bilateral shoulder pain secondary to chronic/complete rotator cuff tear and bicipital tendinopathy/tear.  Treatment has included bilateral subacromial space steroid injections.  Today we proceeded with a left glenohumeral joint steroid injection after obtaining x-rays due to a fall.  Would benefit from formal physical therapy.  Will see him back in 8 weeks if needed.  Orders:    XR shoulder 2+ vw left; Future    Ambulatory referral to Physical Therapy; Future    No follow-ups on file.  Left shoulder x-rays show moderate glenohumeral and acromioclavicular joint osteoarthritis.  The humeral head is high riding.  Tiny calcification in the rotator cuff interval.    Patient is in agreement with the above plan.    HPI:  Capo Pepper is a 74 y.o. male  who presents in follow up.  Here for   Chief Complaint   Patient presents with    Left Shoulder - Pain     Since last visit: See above.    Following history reviewed and updated:  Past Medical History:   Diagnosis Date    Atypical chest pain     Back pain     CHF (congestive heart failure) (HCC)     Constipation 06/01/2024    Diabetes mellitus (HCC)     Legionnaire's disease (HCC)     Leukocytosis 06/02/2024    Lung disorder     Migraine     PONV (postoperative nausea and vomiting)      Past Surgical History:   Procedure Laterality Date    FOOT SURGERY Bilateral     plantar fasciitis    IR OTHER  07/02/2024    OTHER SURGICAL HISTORY      left arm    ROTATOR CUFF REPAIR Bilateral     TONSILLECTOMY      TRIGGER FINGER RELEASE       Social History   Social History     Substance and Sexual Activity   Alcohol Use Yes    Comment: occ     Social History     Substance and Sexual Activity   Drug Use Not Currently    Comment: CBD Oil pt stated no longer using     Tobacco Use History[1]  History reviewed. No pertinent family history.  Allergies[2]     Constitutional:  Ht 5' 10\" (1.778 m)   Wt 85.3 kg " (188 lb)   BMI 26.98 kg/m²    General: NAD.  Eyes: Clear sclerae.  ENT: No inflammation, lesion, or mass of lips.  No tracheal deviation.  Musculoskeletal: As mentioned below.  Integumentary: No visible rashes or skin lesions.  Pulmonary/Chest: Effort normal. No respiratory distress.   Neuro: CN's grossly intact, BROWN.  Psych: Normal affect and judgement.  Vascular: WWP.    Left Shoulder Exam     Tenderness   The patient is experiencing tenderness in the acromion and biceps tendon.    Range of Motion   Active abduction:  abnormal   External rotation:  abnormal   Forward flexion:  abnormal               Large joint arthrocentesis: L glenohumeral    Performed by: Amanda Zavaleta DO  Authorized by: Amanda Zavaleta DO    [place] Protocol:  procedure performed by consultantConsent: Verbal consent obtained. Written consent not obtained  Risks and benefits: risks, benefits and alternatives were discussed  Consent given by: patient  Timeout called at: 5/15/2025 3:11 PM.  Patient understanding: patient states understanding of the procedure being performed  Site marked: the operative site was marked  Patient identity confirmed: verbally with patient  Supporting Documentation  Indications: pain     Is this a Visco injection? NoProcedure Details  Location: shoulder - L glenohumeral  Needle gauge: 21G 2''  Ultrasound guidance: no  Approach: posterolateral  Medications administered: 80 mg triamcinolone acetonide 40 mg/mL; 10 mL ropivacaine 0.5 %    Patient tolerance: patient tolerated the procedure well with no immediate complications  Dressing:  Sterile dressing applied    There was little to no resistance encountered during the injection.    Risks of this procedure include:    - Risk of bleeding since a needle is involved.  - Risk of infection (1/10,000 chance as per recent studies).  Signs/symptoms were discussed and they would prompt an urgent evaluation at an emergency department.  - Risk of pigmentation or skin dimpling in  the skin (2-3% chance as per recent studies) from the steroid.  - Risk of increased pain from steroid flare (1% chance as per recent studies) that typically lasts 24-48 hours.  - Risk of increased blood sugars from the steroid medication that can last for a few weeks.  If the patient is a diabetic or pre-diabetic, they were encouraged to closely monitor their blood sugars and discuss with PCP if elevated more than usual or if having symptoms.    The benefits outweigh the risks and so the procedure was completed.                 [1]   Social History  Tobacco Use   Smoking Status Former    Current packs/day: 0.00    Average packs/day: 1 pack/day for 25.0 years (25.0 ttl pk-yrs)    Types: Cigarettes, Cigars    Start date:     Quit date:     Years since quittin.3   Smokeless Tobacco Never   [2]   Allergies  Allergen Reactions    Poultry Meal - Food Allergy Vomiting

## 2025-06-03 ENCOUNTER — EVALUATION (OUTPATIENT)
Dept: PHYSICAL THERAPY | Facility: MEDICAL CENTER | Age: 75
End: 2025-06-03
Attending: PHYSICAL MEDICINE & REHABILITATION
Payer: MEDICARE

## 2025-06-03 DIAGNOSIS — R29.898 WEAKNESS OF LEFT SHOULDER: ICD-10-CM

## 2025-06-03 DIAGNOSIS — M25.512 CHRONIC LEFT SHOULDER PAIN: Primary | ICD-10-CM

## 2025-06-03 DIAGNOSIS — G89.29 CHRONIC LEFT SHOULDER PAIN: Primary | ICD-10-CM

## 2025-06-03 PROCEDURE — 97112 NEUROMUSCULAR REEDUCATION: CPT

## 2025-06-03 PROCEDURE — 97110 THERAPEUTIC EXERCISES: CPT

## 2025-06-03 PROCEDURE — 97161 PT EVAL LOW COMPLEX 20 MIN: CPT

## 2025-06-03 NOTE — PROGRESS NOTES
PT Evaluation     Today's date: 6/3/2025  Patient name: Capo Pepper  : 1950  MRN: [id number]  Referring provider: Amanda Zavaleta DO  Dx:   Encounter Diagnosis     ICD-10-CM    1. Chronic left shoulder pain  M25.512     G89.29       2. Weakness of left shoulder  R29.898           Start Time: 1130  Stop Time: 1215  Total time in clinic (min): 45 minutes    Assessment  Impairments: abnormal or restricted ROM, activity intolerance, impaired physical strength, lacks appropriate home exercise program, pain with function, poor posture  and poor body mechanics    Assessment details: Capo Pepper is a 75 y.o. male who presents with pain, decreased strength, decreased ROM, and postural  dysfunction Due to these impairments, patient has difficulty performing ADL's, recreational activities, lifting/carrying, reaching. Patient's clinical presentation is consistent with that of the referring diagnosis.      Patient has been educated in postural education, home exercise program, and plan of care. Patient would benefit from skilled physical therapy services to address their aforementioned functional limitations and progress towards prior level of function and independence with home exercise program.    Understanding of Dx/Px/POC: good     Prognosis: good    Goals  STG:  to be achieved within 2-4 weeks  1. Pt will demo 50% improvement in shoulder AROM to improve self care and household ADLs   2. Improve shoulder strength in all deficient planes by 1/2 MMT.  3. Pt will have good understanding of HEP.  4. Pt will demo good sitting and standing posture.  5. Pt will report pain <(2)/10 in shoulder.     LTG:  to be achieved within 4-8 weeks  1. Improve shoulder AROM to be able to reach behind back to tuck in shirt.  2. Improve strength to be able to put dishes into an overhead cabinet.  3. Improve shoulder strength so that pt can lift 10 to shoulder height.  4. Pt will be able to sleep without disturbance secondary to  shoulder pain.  5. Pt will demo full shoulder AROM to return to household duties.  6. Pt will return to work/household ADLs pain free as per PLOF.  7. Pt will demo shoulder strength WNL.    Plan  Patient would benefit from: PT eval and skilled physical therapy  Planned modality interventions: cryotherapy and thermotherapy: hydrocollator packs    Planned therapy interventions: manual therapy, neuromuscular re-education, self care, therapeutic exercise, therapeutic activities, home exercise program, joint mobilization, postural training, patient education, strengthening and stretching    Frequency: 2x week (2-3x/week)  Duration in weeks: 12  Plan of Care beginning date: 6/3/2025  Plan of Care expiration date: 2025  Treatment plan discussed with: patient  Plan details:  HEP development, stretching, strengthening, A/AA/PROM, joint mobilizations, posture education, STM/MI as needed to reduce muscle tension, muscle reeducation, PLOC discussed and agreed upon with patient.          Subjective Evaluation    History of Present Illness  Mechanism of injury: Patient is a 75 right handed male who has chronic bilateral shoulder pain.  Patient slipped and fell on a wet surface getting out of car onto left shoulder.  Patient sought medical care two to three weeks later where x-rays were taken which were negative.  Patient had injection in left shoulder on 5/15.  Injection has helped the pain in left shoulder.  Patient is having issues with reaching over head and repetitive movements.  Patient had bilateral RTC surgery in .  Patient states that approximately one week ago he had to turn the steering wheel quickly which caused extreme pain.  Patient Goals  Patient goals for therapy: decreased pain, increased motion and increased strength    Pain  Current pain ratin  At best pain ratin  At worst pain ratin          Objective     Postural Observations  Seated posture: fair      Observations     Additional  "Observation Details  Scapular hiking with flexion AG.    Active Range of Motion   Left Shoulder   Flexion: 171 degrees   Abduction: 180 degrees   External rotation 90°: 75 degrees   Internal rotation 90°: 62 degrees     Right Shoulder   Flexion: 161 degrees   Abduction: 141 degrees   External rotation 90°: 71 degrees  Internal rotation 90°: 61 degrees     Additional Active Range of Motion Details  All measurements supine.    Strength/Myotome Testing     Left Shoulder     Planes of Motion   Flexion: 3   Abduction: 3   External rotation at 90°: 3-   Internal rotation at 0°: 4+     Right Shoulder     Planes of Motion   Flexion: 4+   Abduction: 4+   External rotation at 0°: 4   Internal rotation at 0°: 5     Tests     Left Shoulder   Positive empty can and Hawkin's.       Problem List[1]   Past Medical History[2]   Past Surgical History[3]      Access Code: [email or web address]  URL: [email or web address]  Date: 06/03/2025  Prepared by: Eduar Loo    Exercises  - Supine Scapular Retraction  - 1-2 x daily - 1-3 sets - 10 reps - 5 hold  - Isometric Shoulder External Rotation at Wall  - 1-2 x daily - 1-2 sets - 10 reps - 5 hold  - Supine Shoulder Flexion Extension AAROM with Dowel  - 1-2 x daily - 1-2 sets - 10 reps  Precautions: none    Eval/Re-Eval POC Expires Auth #/ Referral # Total Visits Start Date Expiration Date Extension Info Visits Limitation   6/3/25                                                                      1 2 3 4 5 6   6/3    FOTO    7 8 9 10 11 12           13 14 15 16 17 18           19 20 21 22 23 24           25 26 27 28 29 30                                Manuals 6/3                                                                Neuro Re-Ed             Supine scap ret 2x10 5\"            Cane iso er/ir supine 2x10 5\"                                                                             Ther Ex             Cane flexion supine 2x10            Postural inst 10'                              "                                                             Ther Activity                                       Gait Training                                       Modalities                                                   [1]   Patient Active Problem List  Diagnosis    Precordial pain    Chronic low back pain    Chronic combined systolic and diastolic congestive heart failure (HCC)    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[facility])    EKG, abnormal    Cardiomyopathy ([facility])    Coronary artery disease involving native coronary artery of native heart without angina pectoris    Mixed hyperlipidemia    Stroke due to embolism of left middle cerebral artery ([facility])    Type 2 diabetes mellitus ([facility])    Pleural effusion    Dyspnea    Acute pain of right shoulder    Primary osteoarthritis of right knee    Nontraumatic complete tear of right rotator cuff    Biceps tendinopathy of right upper extremity    Acute hypoxemic respiratory failure due to COVID-19 ([facility])    Hyponatremia    Ambulatory dysfunction    Bacteremia    Osteomyelitis of lumbar spine ([facility])    Pre-op examination    Chronic left shoulder pain   [2]   Past Medical History:  Diagnosis Date    Atypical chest pain     Back pain     CHF (congestive heart failure) ([facility])     Constipation 06/01/2024    Diabetes mellitus ([facility])     Legionnaire's disease ([facility])     Leukocytosis 06/02/2024    Lung disorder     Migraine     PONV (postoperative nausea and vomiting)    [3]   Past Surgical History:  Procedure Laterality Date    FOOT SURGERY Bilateral     plantar fasciitis    IR OTHER  07/02/2024    OTHER SURGICAL HISTORY      left arm    ROTATOR CUFF REPAIR Bilateral     TONSILLECTOMY      TRIGGER FINGER RELEASE

## 2025-06-06 ENCOUNTER — OFFICE VISIT (OUTPATIENT)
Dept: PHYSICAL THERAPY | Facility: MEDICAL CENTER | Age: 75
End: 2025-06-06
Attending: PHYSICAL MEDICINE & REHABILITATION
Payer: MEDICARE

## 2025-06-06 DIAGNOSIS — R29.898 WEAKNESS OF LEFT SHOULDER: ICD-10-CM

## 2025-06-06 DIAGNOSIS — M25.512 CHRONIC LEFT SHOULDER PAIN: Primary | ICD-10-CM

## 2025-06-06 DIAGNOSIS — G89.29 CHRONIC LEFT SHOULDER PAIN: Primary | ICD-10-CM

## 2025-06-06 PROCEDURE — 97112 NEUROMUSCULAR REEDUCATION: CPT

## 2025-06-06 NOTE — PROGRESS NOTES
Daily Note     Today's date: 2025  Patient name: Capo Pepper  : 1950  MRN: [id number]  Referring provider: Amanda Zavaleta DO  Dx:   Encounter Diagnosis     ICD-10-CM    1. Chronic left shoulder pain  M25.512     G89.29       2. Weakness of left shoulder  R29.898                      Subjective: I was working on putting a deck on my house and I tripped on boards three times and fell onto my left side, now everything hurts.      Objective: See treatment diary below      Assessment: Tolerated treatment fair. Patient would benefit from continued PT  No signs of fractures or new injury noted.  New HEP added.      Plan: Continue per plan of care.      Access Code: [email or web address]  URL: [email or web address]  Date: 2025  Prepared by: Eduar Loo    Exercises  - Supine Scapular Retraction  - 1-2 x daily - 1-3 sets - 10 reps - 5 hold  - Isometric Shoulder External Rotation at Wall  - 1-2 x daily - 1-2 sets - 10 reps - 5 hold  - Supine Shoulder Flexion Extension AAROM with Dowel  - 1-2 x daily - 1-2 sets - 10 reps  Precautions: none    Access Code: [email or web address]  URL: [email or web address]  Date: 2025  Prepared by: Eduar Loo    Exercises  - Seated Scapular Retraction  - 1-3 x daily - 1-3 sets - 10 reps - 5 hold  - Isometric Shoulder External Rotation  - 1-3 x daily - 1-3 sets - 10 reps - 5 hold  - Isometric Shoulder Internal Rotation  - 1 x daily - 1-3 sets - 10 reps - 5 hold  - Isometric Shoulder Flexion  - 1-3 x daily - 1-3 sets - 10 reps - 5 hold  - Isometric Shoulder Extension at Wall  - 1-3 x daily - 1-3 sets - 10 reps - 5 hold  - Isometric Shoulder Adduction  - 1-3 x daily - 1-3 sets - 10 reps - 5 hold  - Isometric Shoulder Abduction  - 1-3 x daily - 1-3 sets - 10 reps - 5 hold      Eval/Re-Eval POC Expires Auth #/ Referral # Total Visits Start Date Expiration Date Extension Info Visits Limitation   6/3/25                                                         "              1 2 3 4 5 6   6/3 6/6   FOTO    7 8 9 10 11 12           13 14 15 16 17 18           19 20 21 22 23 24           25 26 27 28 29 30                                Manuals 6/3 6/6                                                               Neuro Re-Ed             Supine scap ret 2x10 5\" 2x15   5\"           Cane iso er/ir supine 2x10 5\" Seated  2x15  5\"           Cane ER seated  AA           Iso add, abd seated  3x10 5 sec           Iso flexion and ext seated  3x10 5 sec                                     Ther Ex             Cane flexion supine 2x10            Postural inst 10'            Seated bicep curls  3x15 AA with other hand                                                                            Ther Activity                                       Gait Training                                       Modalities                                                     "

## 2025-06-09 ENCOUNTER — DOCUMENTATION (OUTPATIENT)
Dept: ADMINISTRATIVE | Facility: OTHER | Age: 75
End: 2025-06-09

## 2025-06-09 NOTE — PROGRESS NOTES
06/09/25 10:33 AM     DM A1c outreach is not required, patient was called within the last 3 months.    Thank you.  Paul Cabrera MA  [facility]

## 2025-06-10 ENCOUNTER — OFFICE VISIT (OUTPATIENT)
Dept: PHYSICAL THERAPY | Facility: MEDICAL CENTER | Age: 75
End: 2025-06-10
Attending: PHYSICAL MEDICINE & REHABILITATION
Payer: MEDICARE

## 2025-06-10 DIAGNOSIS — R29.898 WEAKNESS OF LEFT SHOULDER: ICD-10-CM

## 2025-06-10 DIAGNOSIS — G89.29 CHRONIC LEFT SHOULDER PAIN: Primary | ICD-10-CM

## 2025-06-10 DIAGNOSIS — M25.512 CHRONIC LEFT SHOULDER PAIN: Primary | ICD-10-CM

## 2025-06-10 PROCEDURE — 97110 THERAPEUTIC EXERCISES: CPT

## 2025-06-10 NOTE — PROGRESS NOTES
Daily Note     Today's date: 6/10/2025  Patient name: Capo Pepper  : 1950  MRN: [id number]  Referring provider: Amanda Zavaleta DO  Dx:   Encounter Diagnosis     ICD-10-CM    1. Chronic left shoulder pain  M25.512     G89.29       2. Weakness of left shoulder  R29.898             Start Time: 0810  Stop Time: 0830  Total time in clinic (min): 20 minutes    Subjective: Both arms are killing me. I have been using my arms a lot putting a deck on my house, working on my trunk and working on the kitchen sink.  Can't do much today.      Objective: See treatment diary below      Assessment: Tolerated treatment fair. Patient would benefit from continued PT    Patient asked to leave early due to overall body pain from being over active over the weekend.  Patient stated decreased bilateral shoulder pain after PROM.      Plan: Continue per plan of care.      Access Code: [email or web address]  URL: [email or web address]  Date: 2025  Prepared by: Eduar Loo    Exercises  - Supine Scapular Retraction  - 1-2 x daily - 1-3 sets - 10 reps - 5 hold  - Isometric Shoulder External Rotation at Wall  - 1-2 x daily - 1-2 sets - 10 reps - 5 hold  - Supine Shoulder Flexion Extension AAROM with Dowel  - 1-2 x daily - 1-2 sets - 10 reps  Precautions: none    Access Code: [email or web address]  URL: [email or web address]  Date: 2025  Prepared by: Eduar Loo    Exercises  - Seated Scapular Retraction  - 1-3 x daily - 1-3 sets - 10 reps - 5 hold  - Isometric Shoulder External Rotation  - 1-3 x daily - 1-3 sets - 10 reps - 5 hold  - Isometric Shoulder Internal Rotation  - 1 x daily - 1-3 sets - 10 reps - 5 hold  - Isometric Shoulder Flexion  - 1-3 x daily - 1-3 sets - 10 reps - 5 hold  - Isometric Shoulder Extension at Wall  - 1-3 x daily - 1-3 sets - 10 reps - 5 hold  - Isometric Shoulder Adduction  - 1-3 x daily - 1-3 sets - 10 reps - 5 hold  - Isometric Shoulder Abduction  - 1-3 x daily - 1-3 sets - 10 reps - 5  "hold      Eval/Re-Eval POC Expires Auth #/ Referral # Total Visits Start Date Expiration Date Extension Info Visits Limitation   6/3/25                                                                      1 2 3 4 5 6   6/3 6/6 6/10  FOTO    7 8 9 10 11 12           13 14 15 16 17 18           19 20 21 22 23 24           25 26 27 28 29 30                                Manuals 6/3 6/6 6/10          PROM flexion  Abd, er, behind back IR   2x10                                                 Neuro Re-Ed             Supine scap ret 2x10 5\" 2x15   5\" 2x15 5#          Cane iso er/ir supine 2x10 5\" Seated  2x15  5\"           Cane ER seated  AA           Iso add, abd seated  3x10 5 sec           Iso flexion and ext seated  3x10 5 sec           Seated ir/er   2x10                       Ther Ex             Cane flexion supine 2x10            Postural inst 10'            Seated bicep curls  3x15 AA with other hand                                                                            Ther Activity                                       Gait Training                                       Modalities                                                     "

## 2025-06-12 ENCOUNTER — OFFICE VISIT (OUTPATIENT)
Dept: PHYSICAL THERAPY | Facility: MEDICAL CENTER | Age: 75
End: 2025-06-12
Attending: PHYSICAL MEDICINE & REHABILITATION
Payer: MEDICARE

## 2025-06-12 DIAGNOSIS — R29.898 WEAKNESS OF LEFT SHOULDER: ICD-10-CM

## 2025-06-12 DIAGNOSIS — G89.29 CHRONIC LEFT SHOULDER PAIN: Primary | ICD-10-CM

## 2025-06-12 DIAGNOSIS — M25.512 CHRONIC LEFT SHOULDER PAIN: Primary | ICD-10-CM

## 2025-06-12 PROCEDURE — 97112 NEUROMUSCULAR REEDUCATION: CPT

## 2025-06-12 NOTE — PROGRESS NOTES
Daily Note     Today's date: 2025  Patient name: Capo Pepper  : 1950  MRN: [id number]  Referring provider: Amanda Zavaleta DO  Dx:   Encounter Diagnosis     ICD-10-CM    1. Chronic left shoulder pain  M25.512     G89.29       2. Weakness of left shoulder  R29.898             Start Time: 0815  Stop Time: 905  Total time in clinic (min): 50 minutes    Subjective: Haven't done much since last session due to both shoulders bothering me.  I really think that working on my car over the weekend hurt my left shoulder.    Objective: See treatment diary below      Assessment: Tolerated treatment fair. Patient would benefit from continued PT    Good PROM in all GH planes but continues with probable large left RTC tear.    Plan: Continue per plan of care.      Access Code: [email or web address]  URL: [email or web address]  Date: 2025  Prepared by: Eduar Loo    Exercises  - Supine Scapular Retraction  - 1-2 x daily - 1-3 sets - 10 reps - 5 hold  - Isometric Shoulder External Rotation at Wall  - 1-2 x daily - 1-2 sets - 10 reps - 5 hold  - Supine Shoulder Flexion Extension AAROM with Dowel  - 1-2 x daily - 1-2 sets - 10 reps  Precautions: none    Access Code: [email or web address]  URL: [email or web address]  Date: 2025  Prepared by: Eduar Loo    Exercises  - Seated Scapular Retraction  - 1-3 x daily - 1-3 sets - 10 reps - 5 hold  - Isometric Shoulder External Rotation  - 1-3 x daily - 1-3 sets - 10 reps - 5 hold  - Isometric Shoulder Internal Rotation  - 1 x daily - 1-3 sets - 10 reps - 5 hold  - Isometric Shoulder Flexion  - 1-3 x daily - 1-3 sets - 10 reps - 5 hold  - Isometric Shoulder Extension at Wall  - 1-3 x daily - 1-3 sets - 10 reps - 5 hold  - Isometric Shoulder Adduction  - 1-3 x daily - 1-3 sets - 10 reps - 5 hold  - Isometric Shoulder Abduction  - 1-3 x daily - 1-3 sets - 10 reps - 5 hold      Eval/Re-Eval POC Expires Auth #/ Referral # Total Visits Start Date Expiration Date  "Extension Info Visits Limitation   6/3/25                                                                      1 2 3 4 5 6   6/3 6/6 6/10 6/12 FOTO    7 8 9 10 11 12           13 14 15 16 17 18           19 20 21 22 23 24           25 26 27 28 29 30                                Manuals 6/3 6/6 6/10 6/12         PROM flexion  Abd, er, behind back IR   2x10                                                 Neuro Re-Ed             Supine scap ret 2x10 5\" 2x15   5\" 2x15 5# Seated  2x15 5\"         Cane iso er/ir supine 2x10 5\" Seated  2x15  5\"  With PT  3x15         Cane ER seated  AA  Sit  With PT  3x15         Iso add, abd seated  3x10 5 sec  2x15 with PT         Iso flexion and ext seated  3x10 5 sec  2x15 with PT         Seated ir/er   2x10                       Ther Ex             Cane flexion supine 2x10            Postural inst 10'            Seated bicep curls  3x15 AA with other hand           Table flexion slides   3x10 then head of table elevated 3x10                                                              Ther Activity                                       Gait Training                                       Modalities             HP    Seated  10'                                    "

## 2025-06-20 ENCOUNTER — APPOINTMENT (OUTPATIENT)
Dept: PHYSICAL THERAPY | Facility: MEDICAL CENTER | Age: 75
End: 2025-06-20
Attending: PHYSICAL MEDICINE & REHABILITATION
Payer: MEDICARE

## 2025-06-26 ENCOUNTER — EVALUATION (OUTPATIENT)
Dept: PHYSICAL THERAPY | Facility: MEDICAL CENTER | Age: 75
End: 2025-06-26
Attending: PHYSICAL MEDICINE & REHABILITATION
Payer: MEDICARE

## 2025-06-26 DIAGNOSIS — R29.898 WEAKNESS OF LEFT SHOULDER: ICD-10-CM

## 2025-06-26 DIAGNOSIS — M25.512 CHRONIC LEFT SHOULDER PAIN: Primary | ICD-10-CM

## 2025-06-26 DIAGNOSIS — G89.29 CHRONIC LEFT SHOULDER PAIN: Primary | ICD-10-CM

## 2025-06-26 PROCEDURE — 97112 NEUROMUSCULAR REEDUCATION: CPT

## 2025-06-26 PROCEDURE — 97110 THERAPEUTIC EXERCISES: CPT

## 2025-06-26 NOTE — PROGRESS NOTES
[id number]    Today's date: 2025  Patient name: Capo Pepper  : 1950  MRN: [id number]  Referring provider: Amanda Zavaleta DO  Dx:   Encounter Diagnosis     ICD-10-CM    1. Chronic left shoulder pain  M25.512     G89.29       2. Weakness of left shoulder  R29.898           Start Time: 0230  Stop Time: 315  Total time in clinic (min): 45 minutes    Assessment  Impairments: abnormal or restricted ROM, activity intolerance, impaired physical strength, lacks appropriate home exercise program, pain with function, poor posture  and poor body mechanics    Assessment details: Capo Pepper is a 75 y.o. male who presents with pain, decreased strength, decreased ROM, and postural  dysfunction Due to these impairments, patient has difficulty performing ADL's, recreational activities, lifting/carrying, reaching. Patient's clinical presentation is consistent with that of the referring diagnosis.      Patient has been educated in postural education, home exercise program, and plan of care. Patient would benefit from skilled physical therapy services to address their aforementioned functional limitations and progress towards prior level of function and independence with home exercise program.          Patient with poor AROM AG. Decreased strength indicative of large RTC tear.  Understanding of Dx/Px/POC: good     Prognosis: good    Goals  STG:  to be achieved within 2-4 weeks  1. Pt will demo 50% improvement in shoulder AROM to improve self care and household ADLs   2. Improve shoulder strength in all deficient planes by 1/2 MMT.  3. Pt will have good understanding of HEP.  4. Pt will demo good sitting and standing posture.  5. Pt will report pain <(2)/10 in shoulder.     LTG:  to be achieved within 4-8 weeks  1. Improve shoulder AROM to be able to reach behind back to tuck in shirt.  2. Improve strength to be able to put dishes into an overhead cabinet.  3. Improve shoulder strength so that pt can  lift 10 to shoulder height.  4. Pt will be able to sleep without disturbance secondary to shoulder pain.  5. Pt will demo full shoulder AROM to return to household duties.  6. Pt will return to work/household ADLs pain free as per PLOF.  7. Pt will demo shoulder strength WNL.    Plan  Patient would benefit from: PT eval and skilled physical therapy  Planned modality interventions: cryotherapy and thermotherapy: hydrocollator packs    Planned therapy interventions: manual therapy, neuromuscular re-education, self care, therapeutic exercise, therapeutic activities, home exercise program, joint mobilization, postural training, patient education, strengthening and stretching    Frequency: 2x week (2-3x/week)  Duration in weeks: 12  Plan of Care beginning date: 6/3/2025  Plan of Care expiration date: 8/26/2025  Treatment plan discussed with: patient  Plan details:  HEP development, stretching, strengthening, A/AA/PROM, joint mobilizations, posture education, STM/MI as needed to reduce muscle tension, muscle reeducation, PLOC discussed and agreed upon with patient.    Patient showing poor progress due to RTC damage.  Do you wish for continuation of PT at this time.          Subjective Evaluation    History of Present Illness  Mechanism of injury: Patient is a 75 right handed male who has chronic bilateral shoulder pain.  Patient slipped and fell on a wet surface getting out of car onto left shoulder.  Patient sought medical care two to three weeks later where x-rays were taken which were negative.  Patient had injection in left shoulder on 5/15.  Injection has helped the pain in left shoulder.  Patient is having issues with reaching over head and repetitive movements.  Patient had bilateral RTC surgery in 2009.  Patient states that approximately one week ago he had to turn the steering wheel quickly which caused extreme pain.    6/26  I have a lot to do at home (put a deck on house, work on car etc..) and I can't do it  because of pain and weakness in my arm.  Patient Goals  Patient goals for therapy: decreased pain, increased motion and increased strength    Pain  Current pain ratin  At best pain ratin  At worst pain ratin          Objective     Postural Observations  Seated posture: fair      Observations     Additional Observation Details  Scapular hiking with flexion AG.    Active Range of Motion   Left Shoulder   Flexion: 105 degrees   Abduction: 95 degrees     Right Shoulder   Flexion: 161 degrees   Abduction: 141 degrees   External rotation 90°: 71 degrees  Internal rotation 90°: 61 degrees     Additional Active Range of Motion Details  All measurements seated, at IE all measurements supine      FXNL ER: to ear  FXNL IR :  L2    Strength/Myotome Testing     Left Shoulder     Planes of Motion   Flexion: 2   Abduction: 2   External rotation at 45°: 2   Internal rotation at 0°: 4+     Right Shoulder     Planes of Motion   Flexion: 4+   Abduction: 4+   External rotation at 0°: 4   Internal rotation at 0°: 5     Tests     Left Shoulder   Positive empty can and Hawkin's.       Problem List[1]   Past Medical History[2]   Past Surgical History[3]     Access Code: [email or web address]  URL: [email or web address]  Date: 2025  Prepared by: Eduar Loo    Exercises  - Supine Scapular Retraction  - 1-2 x daily - 1-3 sets - 10 reps - 5 hold  - Isometric Shoulder External Rotation at Wall  - 1-2 x daily - 1-2 sets - 10 reps - 5 hold  - Supine Shoulder Flexion Extension AAROM with Dowel  - 1-2 x daily - 1-2 sets - 10 reps  Precautions: none    Access Code: [email or web address]  URL: [email or web address]  Date: 2025  Prepared by: Eduar Loo    Exercises  - Seated Scapular Retraction  - 1-3 x daily - 1-3 sets - 10 reps - 5 hold  - Isometric Shoulder External Rotation  - 1-3 x daily - 1-3 sets - 10 reps - 5 hold  - Isometric Shoulder Internal Rotation  - 1 x daily - 1-3 sets - 10 reps - 5 hold  - Isometric Shoulder  "Flexion  - 1-3 x daily - 1-3 sets - 10 reps - 5 hold  - Isometric Shoulder Extension at Wall  - 1-3 x daily - 1-3 sets - 10 reps - 5 hold  - Isometric Shoulder Adduction  - 1-3 x daily - 1-3 sets - 10 reps - 5 hold  - Isometric Shoulder Abduction  - 1-3 x daily - 1-3 sets - 10 reps - 5 hold      Eval/Re-Eval POC Expires Auth #/ Referral # Total Visits Start Date Expiration Date Extension Info Visits Limitation   6/3/25 8/26               6/26 RE                                                      1 2 3 4 5 6   6/3 6/6 6/10 6/12 FOTO  RE  6/26    7 8 9 10 11 12           13 14 15 16 17 18           19 20 21 22 23 24           25 26 27 28 29 30                              PT one on one: 25 min  Manuals 6/3 6/6 6/10 6/12 6/26        PROM flexion  Abd, er, behind back IR   2x10                                                 Neuro Re-Ed             Supine scap ret 2x10 5\" 2x15   5\" 2x15 5# Seated  2x15 5\"         Cane iso er/ir supine 2x10 5\" Seated  2x15  5\"  With PT  3x15         Cane ER seated  AA  Sit  With PT  3x15 With cane  3x10        Iso add, abd seated  3x10 5 sec  2x15 with PT         Iso flexion and ext seated  3x10 5 sec  2x15 with PT         Seated ir/er   2x10          Table flexion and abd slides     3x10        Ther Ex             Cane flexion supine 2x10            Postural inst 10'            Seated bicep curls  3x15 AA with other hand           Table flexion slides   3x10 then head of table elevated 3x10          RE     10'                                               Ther Activity                                       Gait Training                                       Modalities             HP    Seated  10'                                    [1]   Patient Active Problem List  Diagnosis    Precordial pain    Chronic low back pain    Chronic combined systolic and diastolic congestive heart failure (HCC)    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HCC)    EKG, abnormal    Cardiomyopathy (HCC)    Coronary artery disease " involving native coronary artery of native heart without angina pectoris    Mixed hyperlipidemia    Stroke due to embolism of left middle cerebral artery (HCC)    Type 2 diabetes mellitus (HCC)    Pleural effusion    Dyspnea    Acute pain of right shoulder    Primary osteoarthritis of right knee    Nontraumatic complete tear of right rotator cuff    Biceps tendinopathy of right upper extremity    Acute hypoxemic respiratory failure due to COVID-19 ([facility])    Hyponatremia    Ambulatory dysfunction    Bacteremia    Osteomyelitis of lumbar spine ([facility])    Pre-op examination    Chronic left shoulder pain   [2]   Past Medical History:  Diagnosis Date    Atypical chest pain     Back pain     CHF (congestive heart failure) ([facility])     Constipation 06/01/2024    Diabetes mellitus ([facility])     Legionnaire's disease ([facility])     Leukocytosis 06/02/2024    Lung disorder     Migraine     PONV (postoperative nausea and vomiting)    [3]   Past Surgical History:  Procedure Laterality Date    FOOT SURGERY Bilateral     plantar fasciitis    IR OTHER  07/02/2024    OTHER SURGICAL HISTORY      left arm    ROTATOR CUFF REPAIR Bilateral     TONSILLECTOMY      TRIGGER FINGER RELEASE

## 2025-07-03 ENCOUNTER — OFFICE VISIT (OUTPATIENT)
Dept: OBGYN CLINIC | Facility: MEDICAL CENTER | Age: 75
End: 2025-07-03
Payer: MEDICARE

## 2025-07-03 DIAGNOSIS — M25.512 CHRONIC LEFT SHOULDER PAIN: ICD-10-CM

## 2025-07-03 DIAGNOSIS — M19.011 OSTEOARTHRITIS OF BILATERAL GLENOHUMERAL JOINTS: ICD-10-CM

## 2025-07-03 DIAGNOSIS — M75.121 NONTRAUMATIC COMPLETE TEAR OF RIGHT ROTATOR CUFF: Primary | ICD-10-CM

## 2025-07-03 DIAGNOSIS — G89.29 CHRONIC LEFT SHOULDER PAIN: ICD-10-CM

## 2025-07-03 DIAGNOSIS — M19.012 OSTEOARTHRITIS OF BILATERAL GLENOHUMERAL JOINTS: ICD-10-CM

## 2025-07-03 PROBLEM — M25.511 ACUTE PAIN OF RIGHT SHOULDER: Status: RESOLVED | Noted: 2021-03-24 | Resolved: 2025-07-03

## 2025-07-03 PROCEDURE — 99214 OFFICE O/P EST MOD 30 MIN: CPT | Performed by: PHYSICAL MEDICINE & REHABILITATION

## 2025-07-03 NOTE — PROGRESS NOTES
Assessment & Plan  Nontraumatic complete tear of right rotator cuff  Chronic left shoulder pain  Osteoarthritis of bilateral glenohumeral joints  Patient is here in follow up of bilateral shoulder pain secondary to chronic and complete rotator cuff tears with bicipital tendinopathy/tear.  Treatment has included bilateral subacromial space steroid injections and left glenohumeral joint steroid injection.  He has been going through physical therapy.  Patient continues to have pain in both of his shoulders with left worse than right.  At this juncture, we will have him go for consultation with sports surgeon to see if he is a candidate for shoulder arthroplasty.  Patient is very active outside of his home and has not been able to do much recently.  He does follow with an outside primary care physician and has had recent blood work that he will send over to us.  I am happy to see him back if he is to continue conservative care.    Orders:  •  Ambulatory referral to Orthopedic Surgery; Future    No follow-ups on file.    Patient is in agreement with the above plan.    HPI:  Capo Pepper is a 75 y.o. male  who presents in follow up.  Here for   Chief Complaint   Patient presents with   • Left Shoulder - Follow-up, Pain     L > R   • Right Shoulder - Follow-up, Pain       Since last visit: See above.    Following history reviewed and updated:  Past Medical History[1]  Past Surgical History[2]  Social History   Social History     Substance and Sexual Activity   Alcohol Use Yes    Comment: occ     Social History     Substance and Sexual Activity   Drug Use Not Currently    Comment: CBD Oil pt stated no longer using     Tobacco Use History[3]  Family History[4]  Allergies[5]     Constitutional:  There were no vitals taken for this visit.   General: NAD.  Eyes: Clear sclerae.  ENT: No inflammation, lesion, or mass of lips.  No tracheal deviation.  Musculoskeletal: As mentioned below.  Integumentary: No visible rashes or  skin lesions.  Pulmonary/Chest: Effort normal. No respiratory distress.   Neuro: CN's grossly intact, BROWN.  Psych: Normal affect and judgement.  Vascular: WWP.    Right Shoulder Exam     Tenderness   The patient is experiencing tenderness in the acromion and biceps tendon.    Range of Motion   Active abduction:  abnormal   External rotation:  abnormal   Forward flexion:  abnormal     Tests   Apprehension: positive  Aldrich test: positive  Impingement: positive      Left Shoulder Exam     Tenderness   The patient is experiencing tenderness in the biceps tendon and acromion.    Range of Motion   Active abduction:  abnormal   External rotation:  abnormal   Forward flexion:  abnormal     Tests   Aldrich test: positive  Impingement: positive  Drop arm: positive    Other   Erythema: absent  Scars: absent  Sensation: normal  Pulse: present            Procedures           [1]  Past Medical History:  Diagnosis Date   • Atypical chest pain    • Back pain    • CHF (congestive heart failure) ([facility])    • Constipation 2024   • Diabetes mellitus ([facility])    • Legionnaire's disease ([facility])    • Leukocytosis 2024   • Lung disorder    • Migraine    • PONV (postoperative nausea and vomiting)    [2]  Past Surgical History:  Procedure Laterality Date   • FOOT SURGERY Bilateral     plantar fasciitis   • IR OTHER  2024   • OTHER SURGICAL HISTORY      left arm   • ROTATOR CUFF REPAIR Bilateral    • TONSILLECTOMY     • TRIGGER FINGER RELEASE     [3]  Social History  Tobacco Use   Smoking Status Former   • Current packs/day: 0.00   • Average packs/day: 1 pack/day for 25.0 years (25.0 ttl pk-yrs)   • Types: Cigarettes, Cigars   • Start date:    • Quit date:    • Years since quittin.5   Smokeless Tobacco Never   [4]  No family history on file.  [5]  Allergies  Allergen Reactions   • Poultry Meal - Food Allergy Vomiting

## 2025-07-03 NOTE — ASSESSMENT & PLAN NOTE
Patient is here in follow up of bilateral shoulder pain secondary to chronic and complete rotator cuff tears with bicipital tendinopathy/tear.  Treatment has included bilateral subacromial space steroid injections and left glenohumeral joint steroid injection.  He has been going through physical therapy.  Patient continues to have pain in both of his shoulders with left worse than right.  At this juncture, we will have him go for consultation with sports surgeon to see if he is a candidate for shoulder arthroplasty.  Patient is very active outside of his home and has not been able to do much recently.  He does follow with an outside primary care physician and has had recent blood work that he will send over to us.  I am happy to see him back if he is to continue conservative care.    Orders:    Ambulatory referral to Orthopedic Surgery; Future

## 2025-07-17 ENCOUNTER — TELEPHONE (OUTPATIENT)
Dept: OBGYN CLINIC | Facility: MEDICAL CENTER | Age: 75
End: 2025-07-17

## 2025-07-17 ENCOUNTER — OFFICE VISIT (OUTPATIENT)
Dept: OBGYN CLINIC | Facility: MEDICAL CENTER | Age: 75
End: 2025-07-17
Payer: MEDICARE

## 2025-07-17 VITALS — WEIGHT: 188 LBS | BODY MASS INDEX: 26.98 KG/M2

## 2025-07-17 DIAGNOSIS — M12.812 ROTATOR CUFF ARTHROPATHY OF BOTH SHOULDERS: ICD-10-CM

## 2025-07-17 DIAGNOSIS — M12.811 ROTATOR CUFF ARTHROPATHY OF BOTH SHOULDERS: ICD-10-CM

## 2025-07-17 PROCEDURE — 20610 DRAIN/INJ JOINT/BURSA W/O US: CPT

## 2025-07-17 PROCEDURE — 99203 OFFICE O/P NEW LOW 30 MIN: CPT | Performed by: STUDENT IN AN ORGANIZED HEALTH CARE EDUCATION/TRAINING PROGRAM

## 2025-07-17 RX ADMIN — LIDOCAINE HYDROCHLORIDE 2 ML: 10 INJECTION, SOLUTION INFILTRATION; PERINEURAL at 14:00

## 2025-07-17 RX ADMIN — TRIAMCINOLONE ACETONIDE 40 MG: 40 INJECTION, SUSPENSION INTRA-ARTICULAR; INTRAMUSCULAR at 14:00

## 2025-07-17 RX ADMIN — BUPIVACAINE HYDROCHLORIDE 2 ML: 2.5 INJECTION, SOLUTION INFILTRATION; PERINEURAL at 14:00

## 2025-07-17 NOTE — TELEPHONE ENCOUNTER
Patient came to checkout today and asked if he can have another script for PT   Not sure if this is something you would order or Dr Zavaleta

## 2025-07-17 NOTE — PROGRESS NOTES
Assessment & Plan  Rotator cuff arthropathy of both shoulders  Discussed history, exam, and imaging with patient. Presentation most consistent with bilateral rotator cuff arthropathy and we will plan for non-operative management at this time. Discussed surgical intervention in the form of reverse total shoulder arthroplasty which patient is not interested in at this time.  Discussed oral/topical medication regimen. Will plan for use of oral over the counter analgesics as needed.  Discussed injections as a pain adjunct. Discussed repeat corticosteroid injections to the bilateral shoulders.  Discussed that his most recent injection to the left shoulder was 2 months ago, he would need to wait at least 3 months from previous injection before repeating.  His last injection to the right shoulder was 6 months ago, patient elects to proceed with repeat right shoulder corticosteroid injection at today's visit.  Right subacromial corticosteroid injection with Kenalog provided at today's visit, patient tolerated procedure well, documentation below.  Discussed that he can continue with corticosteroid injections to the bilateral shoulders if he would like to continue with nonoperative management.  Discussed that he can return to Dr. Zavaleta for these injections, but that we would also be happy to see him for injections as well.  Discussed rehabilitation efforts. Will plan for continued formal physical therapy for gradual range of motion and strengthening.     Return if symptoms worsen or fail to improve.   _____________________________________________________  CHIEF COMPLAINT:  No chief complaint on file.      SUBJECTIVE:  Capo Pepper is a 75 y.o. year old male, who presents for evaluation of bilateral shoulder pain with referral from Dr. Zavaleta.  Patient notes that he has had chronic bilateral shoulder pain for greater than 15 years.  He notes he had bilateral rotator cuff repair surgeries in [place] around 2009.  He  notes pain to the left shoulder is to the anterior aspect with radiation down the anterolateral upper arm to the level of the elbow.  He denies distal paresthesias.  He notes he is very active at home, and does work around his house, most recently putting in a deck for the past few weeks.  He notes that since increasing activity, he has had increasing pain to the left shoulder.  He did have glenohumeral injection 2 months ago Dr. Zavaleta, and notes that this provided moderate relief of pain up until his increase in activity over the past few weeks.  He notes the pain in the right shoulder is similar, localizing to the anterior shoulder with radiation down the anterolateral upper arm.  He notes that pain in the right shoulder at this time is mild.  He had subacromial injection 6 months ago with mild relief of pain.  He notes that he has been in formal physical therapy, with 1 visit a few weeks ago.  He did not return to physical therapy after that visit, as Dr. Zavaleta wanted him to speak with the surgeon first before continuing with therapy.    PAST MEDICAL HISTORY:  Past Medical History[1]    PAST SURGICAL HISTORY:  Past Surgical History[2]    FAMILY HISTORY:  Family History[3]    SOCIAL HISTORY:  Social History[4]    MEDICATIONS:  Current Medications[5]    ALLERGIES:  Allergies[6]    Review of systems:   Constitutional: Negative for fatigue, fever or loss of apetite.   HENT: Negative.    Respiratory: Negative for shortness of breath, dyspnea.    Cardiovascular: Negative for chest pain/tightness.   Gastrointestinal: Negative for abdominal pain, N/V.   Endocrine: Negative for cold/heat intolerance, unexplained weight loss/gain.   Genitourinary: Negative for flank pain, dysuria, hematuria.   Musculoskeletal: As in HPI  Skin: Negative for rash.    Neurological: Negative for numbness or tingling  Psychiatric/Behavioral: Negative for agitation.  _____________________________________________________  PHYSICAL  "EXAMINATION:    Weight 85.3 kg (188 lb).    General: well developed, well nourished\", alert and oriented times 3, no acute distress  HEENT: Benign, normocephalic, atraumatic  Cardiovascular: regular rate    Pulmonary: No wheezing or stridor  Abdomen: Soft, Nontender  Skin: No open wounds, erythema, rash  Neurovascular: per examination below    MUSCULOSKELETAL EXAMINATION:    Left Shoulder exam  No bruising, swelling or deformity  NonTTP cervical spine, clavicle, AC joint, acromion, scapular spine, posterior joint line, deltoid, anterior joint line, bicipital groove  Active ROM  Forward flexion: 170  External rotation in adduction: 10 degrees  Internal rotation: lumbar spine  Strength Testin+/5 with Jana  5/5 ER in adduction  Provocative maneuvers:  +: Neer impingement, Aldrich  -: Drop arm, speeds, Tolland's, belly press, liftoff  SILT C5-T1  2+ Radial pulse, symmetric with contralateral      Right shoulder exam  No bruising, swelling or deformity  NonTTP cervical spine, clavicle, AC joint, acromion, scapular spine, posterior joint line, deltoid, anterior joint line, bicipital groove  Active ROM  Forward flexion: 170  External rotation in adduction: 20 degrees  Internal rotation: Lumbar spine  Strength Testin+/5 with Jana  5/5 ER in adduction  Provocative maneuvers:  +: Neer impingement  -: Drop arm, Aldrich, belly press, liftoff, speeds, Tolland's  SILT C5-T1  2+ Radial pulse, symmetric with contralateral         Large joint arthrocentesis: R subacromial bursa    Performed by: Dejan Herbert PA-C  Authorized by: Umair Dao MD    Universal Protocol:  Consent: Verbal consent obtained  Risks and benefits: risks, benefits and alternatives were discussed  Consent given by: patient  Patient understanding: patient states understanding of the procedure being performed  Patient identity confirmed: verbally with patient  Supporting Documentation  Indications: pain     Is this a Visco injection? NoProcedure " Details  Location: shoulder - R subacromial bursa  Preparation: Patient was prepped and draped in the usual sterile fashion  Needle size: 22 G  Ultrasound guidance: no  Approach: posterolateral  Medications administered: 2 mL bupivacaine 0.25 %; 2 mL lidocaine 1 %; 40 mg triamcinolone acetonide 40 mg/mL    Patient tolerance: patient tolerated the procedure well with no immediate complications  Dressing:  Sterile dressing applied        _____________________________________________________  STUDIES REVIEWED:  I have personally reviewed pertinent films in PACS and my interpretation is:     Xrays of the left shoulder taken on 5/15/25 demonstrate superior migration of the humeral head relative to the glenoid.  Glenohumeral joint is well-preserved with marginal osteophyte formation to the inferior glenoid.  No acute fracture or dislocation is demonstrated.  Incidental changes to acromioclavicular joint.    Xrays of the right shoulder taken on 3/19/2021 demonstrate superior migration of humeral head relative to glenoid.  No acute fracture or dislocation.      Scribe Attestation      I,:  Dejan Herbert PA-C am acting as a scribe while in the presence of the attending physician.:       I,:  Umair Dao MD personally performed the services described in this documentation    as scribed in my presence.:                    [1]   Past Medical History:  Diagnosis Date    Atypical chest pain     Back pain     CHF (congestive heart failure) (HCC)     Constipation 06/01/2024    Diabetes mellitus (HCC)     Legionnaire's disease (HCC)     Leukocytosis 06/02/2024    Lung disorder     Migraine     PONV (postoperative nausea and vomiting)    [2]   Past Surgical History:  Procedure Laterality Date    FOOT SURGERY Bilateral     plantar fasciitis    IR OTHER  07/02/2024    OTHER SURGICAL HISTORY      left arm    ROTATOR CUFF REPAIR Bilateral     TONSILLECTOMY      TRIGGER FINGER RELEASE     [3] No family history on file.  [4]   Social  History  Tobacco Use    Smoking status: Former     Current packs/day: 0.00     Average packs/day: 1 pack/day for 25.0 years (25.0 ttl pk-yrs)     Types: Cigarettes, Cigars     Start date:      Quit date:      Years since quittin.5    Smokeless tobacco: Never   Vaping Use    Vaping status: Never Used   Substance Use Topics    Alcohol use: Yes     Comment: occ    Drug use: Not Currently     Comment: CBD Oil pt stated no longer using   [5]   Current Outpatient Medications:     Alcohol Swabs 70 % PADS, May substitute brand based on insurance coverage. Check glucose TID., Disp: 100 each, Rfl: 0    aspirin 81 mg chewable tablet, Chew 81 mg daily, Disp: , Rfl:     Blood Glucose Monitoring Suppl (OneTouch Verio Reflect) w/Device KIT, May substitute brand based on insurance coverage. Check glucose TID., Disp: 1 kit, Rfl: 0    cefTRIAXone (ROCEPHIN) 10 g injection, , Disp: , Rfl:     glucose blood (OneTouch Verio) test strip, May substitute brand based on insurance coverage. Check glucose TID., Disp: 100 each, Rfl: 0    insulin degludec (Tresiba FlexTouch) 100 units/mL injection pen, Inject 32 Units under the skin daily at bedtime (Patient not taking: Reported on 8/15/2024), Disp: 15 mL, Rfl: 0    insulin lispro (HumaLOG KwikPen) 100 units/mL injection pen, Inject 10 Units under the skin 3 (three) times a day with meals (Patient not taking: Reported on 8/15/2024), Disp: 15 mL, Rfl: 0    Insulin Pen Needle (BD Pen Needle Catherine 2nd Gen) 32G X 4 MM MISC, For use with insulin pen. Pharmacy may dispense brand covered by insurance. (Patient not taking: Reported on 2024), Disp: 100 each, Rfl: 0    Insulin Pen Needle (BD Pen Needle Catherine 2nd Gen) 32G X 4 MM MISC, For use with insulin pen. Pharmacy may dispense brand covered by insurance. (Patient not taking: Reported on 2024), Disp: 100 each, Rfl: 0    metoprolol succinate (TOPROL-XL) 50 mg 24 hr tablet, Take 50 mg by mouth daily. Indications: ., Disp: , Rfl:      OneTouch Delica Lancets 33G MISC, May substitute brand based on insurance coverage. Check glucose TID., Disp: 100 each, Rfl: 0    potassium chloride (Klor-Con M20) 20 mEq tablet, Take 20 mEq by mouth daily. Indications: ., Disp: , Rfl:     ramipril (ALTACE) 1.25 mg capsule, Take 1.25 mg by mouth daily., Disp: , Rfl:     Sodium Chloride Flush (Normal Saline Flush) 0.9 % SOLN, Inject 10-20 mL into a catheter in a vein in the morning. Before and after Abx and as needed for flushing  Indications: ., Disp: , Rfl:     TiZANidine (ZANAFLEX) 4 MG capsule, Take 4 mg by mouth 3 (three) times a day as needed for muscle spasms, Disp: , Rfl:     torsemide (DEMADEX) 20 mg tablet, Take 20 mg by mouth daily, Disp: , Rfl:   [6]   Allergies  Allergen Reactions    Poultry Meal - Food Allergy Vomiting

## 2025-07-17 NOTE — ASSESSMENT & PLAN NOTE
Discussed history, exam, and imaging with patient. Presentation most consistent with bilateral rotator cuff arthropathy and we will plan for non-operative management at this time. Discussed surgical intervention in the form of reverse total shoulder arthroplasty which patient is not interested in at this time.  Discussed oral/topical medication regimen. Will plan for use of oral over the counter analgesics as needed.  Discussed injections as a pain adjunct. Discussed repeat corticosteroid injections to the bilateral shoulders.  Discussed that his most recent injection to the left shoulder was 2 months ago, he would need to wait at least 3 months from previous injection before repeating.  His last injection to the right shoulder was 6 months ago, patient elects to proceed with repeat right shoulder corticosteroid injection at today's visit.  Right subacromial corticosteroid injection with Kenalog provided at today's visit, patient tolerated procedure well, documentation below.  Discussed that he can continue with corticosteroid injections to the bilateral shoulders if he would like to continue with nonoperative management.  Discussed that he can return to Dr. Zavaleta for these injections, but that we would also be happy to see him for injections as well.  Discussed rehabilitation efforts. Will plan for continued formal physical therapy for gradual range of motion and strengthening.

## 2025-07-18 RX ORDER — LIDOCAINE HYDROCHLORIDE 10 MG/ML
2 INJECTION, SOLUTION INFILTRATION; PERINEURAL
Status: COMPLETED | OUTPATIENT
Start: 2025-07-17 | End: 2025-07-17

## 2025-07-18 RX ORDER — TRIAMCINOLONE ACETONIDE 40 MG/ML
40 INJECTION, SUSPENSION INTRA-ARTICULAR; INTRAMUSCULAR
Status: COMPLETED | OUTPATIENT
Start: 2025-07-17 | End: 2025-07-17

## 2025-07-18 RX ORDER — BUPIVACAINE HYDROCHLORIDE 2.5 MG/ML
2 INJECTION, SOLUTION INFILTRATION; PERINEURAL
Status: COMPLETED | OUTPATIENT
Start: 2025-07-17 | End: 2025-07-17

## 2025-07-21 ENCOUNTER — EVALUATION (OUTPATIENT)
Dept: PHYSICAL THERAPY | Facility: MEDICAL CENTER | Age: 75
End: 2025-07-21
Attending: STUDENT IN AN ORGANIZED HEALTH CARE EDUCATION/TRAINING PROGRAM
Payer: MEDICARE

## 2025-07-21 DIAGNOSIS — G89.29 CHRONIC LEFT SHOULDER PAIN: Primary | ICD-10-CM

## 2025-07-21 DIAGNOSIS — R29.898 WEAKNESS OF LEFT SHOULDER: ICD-10-CM

## 2025-07-21 DIAGNOSIS — M12.812 ROTATOR CUFF ARTHROPATHY OF BOTH SHOULDERS: ICD-10-CM

## 2025-07-21 DIAGNOSIS — M25.512 CHRONIC LEFT SHOULDER PAIN: Primary | ICD-10-CM

## 2025-07-21 DIAGNOSIS — M12.811 ROTATOR CUFF ARTHROPATHY OF BOTH SHOULDERS: ICD-10-CM

## 2025-07-21 PROCEDURE — 97112 NEUROMUSCULAR REEDUCATION: CPT

## 2025-07-21 PROCEDURE — 97530 THERAPEUTIC ACTIVITIES: CPT

## 2025-07-21 NOTE — PROGRESS NOTES
[id number]    Today's date: 2025  Patient name: Capo Pepper  : 1950  MRN: [id number]  Referring provider: Umair Dao MD  Dx:   Encounter Diagnosis     ICD-10-CM    1. Chronic left shoulder pain  M25.512     G89.29       2. Weakness of left shoulder  R29.898       3. Rotator cuff arthropathy of both shoulders  M12.811 Ambulatory Referral to Physical Therapy    M12.812           Start Time: 1200  Stop Time: 1245  Total time in clinic (min): 45 minutes    Assessment  Impairments: abnormal or restricted ROM, activity intolerance, impaired physical strength, lacks appropriate home exercise program, pain with function, poor posture  and poor body mechanics    Assessment details: Capo Pepper is a 75 y.o. male who presents with pain, decreased strength, decreased ROM, and postural  dysfunction Due to these impairments, patient has difficulty performing ADL's, recreational activities, lifting/carrying, reaching. Patient's clinical presentation is consistent with that of the referring diagnosis.      Patient has been educated in postural education, home exercise program, and plan of care. Patient would benefit from skilled physical therapy services to address their aforementioned functional limitations and progress towards prior level of function and independence with home exercise program.          Patient with poor AROM AG. Decreased strength indicative of large RTC tear.        Patient met with surgeon and it was decided that patient either needs a reverse TSR or to continue with conservative treatment.  Patient decided to continue with PT as compared to having surgrey.    Understanding of Dx/Px/POC: good     Prognosis: good    Goals  STG:  to be achieved within 2-4 weeks  1. Pt will demo 50% improvement in shoulder AROM to improve self care and household ADLs   2. Improve shoulder strength in all deficient planes by 1/2 MMT.  3. Pt will have good understanding of HEP.  4. Pt will  demo good sitting and standing posture.  5. Pt will report pain <(2)/10 in shoulder.     LTG:  to be achieved within 4-8 weeks  1. Improve shoulder AROM to be able to reach behind back to tuck in shirt.  2. Improve strength to be able to put dishes into an overhead cabinet.  3. Improve shoulder strength so that pt can lift 10 to shoulder height.  4. Pt will be able to sleep without disturbance secondary to shoulder pain.  5. Pt will demo full shoulder AROM to return to household duties.  6. Pt will return to work/household ADLs pain free as per PLOF.  7. Pt will demo shoulder strength WNL.    Plan  Patient would benefit from: PT eval and skilled physical therapy  Planned modality interventions: cryotherapy and thermotherapy: hydrocollator packs    Planned therapy interventions: manual therapy, neuromuscular re-education, self care, therapeutic exercise, therapeutic activities, home exercise program, joint mobilization, postural training, patient education, strengthening and stretching    Frequency: 2x week (2-3x/week)  Duration in weeks: 12  Plan of Care beginning date: 7/21/2025  Plan of Care expiration date: 10/13/2025  Treatment plan discussed with: patient  Plan details:  HEP development, stretching, strengthening, A/AA/PROM, joint mobilizations, posture education, STM/MI as needed to reduce muscle tension, muscle reeducation, PLOC discussed and agreed upon with patient.    Patient showing poor progress due to RTC damage.  Do you wish for continuation of PT at this time.          Subjective Evaluation    History of Present Illness  Mechanism of injury: Patient is a 75 right handed male who has chronic bilateral shoulder pain.  Patient slipped and fell on a wet surface getting out of car onto left shoulder.  Patient sought medical care two to three weeks later where x-rays were taken which were negative.  Patient had injection in left shoulder on 5/15.  Injection has helped the pain in left shoulder.  Patient is  having issues with reaching over head and repetitive movements.  Patient had bilateral RTC surgery in .  Patient states that approximately one week ago he had to turn the steering wheel quickly which caused extreme pain.      I have a lot to do at home (put a deck on house, work on car etc..) and I can't do it because of pain and weakness in my arm.       Pain averages around a 5 to 6/10 but I have times when I go to use it that the pain spikes to high numbers.  I really do not want to have surgery.  The surgeon said I will be in PT 3 to 6 months and then we will see how I'm doing and if I'm not satisfied with the results that I would need surgery at that point to improve. Right shoulder isn't great either and now it has to compensate for the left.    Patient Goals  Patient goals for therapy: decreased pain, increased motion and increased strength    Pain  Current pain ratin  At best pain ratin  At worst pain ratin          Objective     Postural Observations  Seated posture: fair      Observations     Additional Observation Details  Scapular hiking with flexion AG.    Active Range of Motion   Left Shoulder   Flexion: 158 degrees   Abduction: 95 degrees   External rotation 90°: 52 degrees   Internal rotation 90°: 69 degrees     Right Shoulder   Flexion: 145 degrees   Abduction: 158 degrees     Additional Active Range of Motion Details  Left measurements supine except functional IR/ER  Right in seated      FXNL ER: Top of Cranium (L)  R(T3)  FXNL IR :  T10 (L)     R(T12)    Strength/Myotome Testing     Left Shoulder     Planes of Motion   Flexion: 2   Abduction: 2   External rotation at 0°: 2+   Internal rotation at 0°: 5     Right Shoulder     Planes of Motion   Flexion: 4+   Abduction: 4+   External rotation at 0°: 4   Internal rotation at 0°: 5     Tests     Left Shoulder   Positive empty can and Hawkin's.       Problem List[1]   Past Medical History[2]   Past Surgical History[3]     Access  Code: [email or web address]  URL: [email or web address]  Date: 06/03/2025  Prepared by: Eduardk Loo    Exercises  - Supine Scapular Retraction  - 1-2 x daily - 1-3 sets - 10 reps - 5 hold  - Isometric Shoulder External Rotation at Wall  - 1-2 x daily - 1-2 sets - 10 reps - 5 hold  - Supine Shoulder Flexion Extension AAROM with Dowel  - 1-2 x daily - 1-2 sets - 10 reps  Precautions: none    Access Code: [email or web address]  URL: [email or web address]  Date: 06/06/2025  Prepared by: Eduar Loo    Exercises  - Seated Scapular Retraction  - 1-3 x daily - 1-3 sets - 10 reps - 5 hold  - Isometric Shoulder External Rotation  - 1-3 x daily - 1-3 sets - 10 reps - 5 hold  - Isometric Shoulder Internal Rotation  - 1 x daily - 1-3 sets - 10 reps - 5 hold  - Isometric Shoulder Flexion  - 1-3 x daily - 1-3 sets - 10 reps - 5 hold  - Isometric Shoulder Extension at Wall  - 1-3 x daily - 1-3 sets - 10 reps - 5 hold  - Isometric Shoulder Adduction  - 1-3 x daily - 1-3 sets - 10 reps - 5 hold  - Isometric Shoulder Abduction  - 1-3 x daily - 1-3 sets - 10 reps - 5 hold      Access Code: [email or web address]  URL: [email or web address]  Date: 07/21/2025  Prepared by: Eduar Cinda    Exercises  - Supine Scapular Retraction  - 1-2 x daily - 1-3 sets - 10 reps - 5 hold  - Supine Shoulder Flexion AAROM with Dowel  - 1-2 x daily - 1-2 sets - 10 reps  - Standing Isometric Shoulder External Rotation with Doorway  - 1-2 x daily - 1-2 sets - 10 reps - 5 hold    Eval/Re-Eval POC Expires Auth #/ Referral # Total Visits Start Date Expiration Date Extension Info Visits Limitation   6/3/25 8/26               6/26 RE                7/21 RE 10/13                                     1 2 3 4 5 6   6/3 6/6 6/10 6/12 FOTO  RE  6/26 7/21 FOTO  RE   7 8 9 10 11 12           13 14 15 16 17 18           19 20 21 22 23 24           25 26 27 28 29 30                              PT one on one: 25 min  Manuals 6/3 6/6 6/10 6/12 6/26 7/21       PROM  "flexion  Abd, er, behind back IR   2x10                                                 Neuro Re-Ed             Supine scap ret 2x10 5\" 2x15   5\" 2x15 5# Seated  2x15 5\"  Supine 2x10       Cane iso er/ir supine 2x10 5\" Seated  2x15  5\"  With PT  3x15         Cane ER seated  AA  Sit  With PT  3x15 With cane  3x10 2x10 with cane       Iso add, abd seated  3x10 5 sec  2x15 with PT         Iso flexion and ext seated  3x10 5 sec  2x15 with PT         Seated ir/er   2x10          Table flexion and abd slides     3x10        Cane flexion supine      2x10       Ther Ex             Cane flexion supine 2x10            Postural inst 10'            Seated bicep curls  3x15 AA with other hand           Table flexion slides   3x10 then head of table elevated 3x10          RE     10' 10'                                              Ther Activity                                       Gait Training                                       Modalities             HP    Seated  10'                                      [1]   Patient Active Problem List  Diagnosis    Precordial pain    Chronic low back pain    Chronic combined systolic and diastolic congestive heart failure (HCC)    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[facility])    EKG, abnormal    Cardiomyopathy ([facility])    Coronary artery disease involving native coronary artery of native heart without angina pectoris    Mixed hyperlipidemia    Stroke due to embolism of left middle cerebral artery ([facility])    Type 2 diabetes mellitus ([facility])    Pleural effusion    Dyspnea    Primary osteoarthritis of right knee    Nontraumatic complete tear of right rotator cuff    Biceps tendinopathy of right upper extremity    Acute hypoxemic respiratory failure due to COVID-19 ([facility])    Hyponatremia    Ambulatory dysfunction    Bacteremia    Osteomyelitis of lumbar spine ([facility])    Pre-op examination    Chronic left shoulder pain    Osteoarthritis of bilateral glenohumeral joints   [2]   Past Medical History:  Diagnosis Date    " Atypical chest pain     Back pain     CHF (congestive heart failure) (HCC)     Constipation 06/01/2024    Diabetes mellitus (HCC)     Legionnaire's disease (HCC)     Leukocytosis 06/02/2024    Lung disorder     Migraine     PONV (postoperative nausea and vomiting)    [3]   Past Surgical History:  Procedure Laterality Date    FOOT SURGERY Bilateral     plantar fasciitis    IR OTHER  07/02/2024    OTHER SURGICAL HISTORY      left arm    ROTATOR CUFF REPAIR Bilateral     TONSILLECTOMY      TRIGGER FINGER RELEASE

## 2025-07-23 ENCOUNTER — OFFICE VISIT (OUTPATIENT)
Dept: PHYSICAL THERAPY | Facility: MEDICAL CENTER | Age: 75
End: 2025-07-23
Attending: STUDENT IN AN ORGANIZED HEALTH CARE EDUCATION/TRAINING PROGRAM
Payer: MEDICARE

## 2025-07-23 DIAGNOSIS — M12.811 ROTATOR CUFF ARTHROPATHY OF BOTH SHOULDERS: ICD-10-CM

## 2025-07-23 DIAGNOSIS — M12.812 ROTATOR CUFF ARTHROPATHY OF BOTH SHOULDERS: ICD-10-CM

## 2025-07-23 DIAGNOSIS — R29.898 WEAKNESS OF LEFT SHOULDER: ICD-10-CM

## 2025-07-23 DIAGNOSIS — M25.512 CHRONIC LEFT SHOULDER PAIN: Primary | ICD-10-CM

## 2025-07-23 DIAGNOSIS — G89.29 CHRONIC LEFT SHOULDER PAIN: Primary | ICD-10-CM

## 2025-07-23 PROCEDURE — 97110 THERAPEUTIC EXERCISES: CPT

## 2025-07-23 PROCEDURE — 97112 NEUROMUSCULAR REEDUCATION: CPT

## 2025-07-23 NOTE — PROGRESS NOTES
"Daily Note     Today's date: 2025  Patient name: Capo Pepper  : 1950  MRN: [id number]  Referring provider: Umair Dao MD  Dx:   Encounter Diagnosis     ICD-10-CM    1. Chronic left shoulder pain  M25.512     G89.29       2. Weakness of left shoulder  R29.898       3. Rotator cuff arthropathy of both shoulders  M12.811     M12.812           Start Time: 1445  Stop Time: 1515  Total time in clinic (min): 30 minutes    Subjective: \"Finished building my deck and left shoulder held up ok.  I threw my back out this am taking a tire off my vehicle.\"      Objective: See treatment diary below      Assessment: Tolerated treatment well. Patient would benefit from continued PT  Good 1 rep flexion AG with L UE at end of session.      Plan: Continue per plan of care.      Access Code: [email or web address]  URL: [email or web address]  Date: 2025  Prepared by: Eduar Loo    Exercises  - Supine Scapular Retraction  - 1-2 x daily - 1-3 sets - 10 reps - 5 hold  - Isometric Shoulder External Rotation at Wall  - 1-2 x daily - 1-2 sets - 10 reps - 5 hold  - Supine Shoulder Flexion Extension AAROM with Dowel  - 1-2 x daily - 1-2 sets - 10 reps  Precautions: none    Access Code: [email or web address]  URL: [email or web address]  Date: 2025  Prepared by: Eduar Loo    Exercises  - Seated Scapular Retraction  - 1-3 x daily - 1-3 sets - 10 reps - 5 hold  - Isometric Shoulder External Rotation  - 1-3 x daily - 1-3 sets - 10 reps - 5 hold  - Isometric Shoulder Internal Rotation  - 1 x daily - 1-3 sets - 10 reps - 5 hold  - Isometric Shoulder Flexion  - 1-3 x daily - 1-3 sets - 10 reps - 5 hold  - Isometric Shoulder Extension at Wall  - 1-3 x daily - 1-3 sets - 10 reps - 5 hold  - Isometric Shoulder Adduction  - 1-3 x daily - 1-3 sets - 10 reps - 5 hold  - Isometric Shoulder Abduction  - 1-3 x daily - 1-3 sets - 10 reps - 5 hold                             Eval/Re-Eval POC Expires Auth #/ Referral # Total " "Visits Start Date Expiration Date Extension Info Visits Limitation   6/3/25 8/26               6/26 RE                7/21 RE 10/13                                     1 2 3 4 5 6   6/3 6/6 6/10 6/12 FOTO  RE  6/26 7/21 FOTO  RE   7 8 9 10 11 12   7/23        13 14 15 16 17 18           19 20 21 22 23 24           25 26 27 28 29 30                              PT one on one: 30 min  Manuals 6/3 6/6 6/10 6/12 6/26 7/21 7/23      PROM flexion  Abd, er, behind back IR   2x10                                                 Neuro Re-Ed             Supine scap ret 2x10 5\" 2x15   5\" 2x15 5# Seated  2x15 5\"  Supine 2x10 Seatd   3x15  5\"      Cane iso er/ir supine 2x10 5\" Seated  2x15  5\"  With PT  3x15         Cane ER seated  AA  Sit  With PT  3x15 With cane  3x10 2x10 with cane       Iso add, abd seated  3x10 5 sec  2x15 with PT         Iso flexion and ext seated  3x10 5 sec  2x15 with PT         Seated ir/er   2x10    With cane  3x15      Table flexion and abd slides     3x10        Cane flexion supine      2x10       pulleys       5'      AA flexion seated       With PT 5x      Ther Ex             Cane flexion supine 2x10            Postural inst 10'            Seated bicep curls  3x15 AA with other hand     With cane  3x10      Table flexion slides   3x10 then head of table elevated 3x10          RE     10' 10'                                              Ther Activity                                       Gait Training                                       Modalities             HP    Seated  10'                                      "

## 2025-07-28 ENCOUNTER — OFFICE VISIT (OUTPATIENT)
Dept: PHYSICAL THERAPY | Facility: MEDICAL CENTER | Age: 75
End: 2025-07-28
Attending: STUDENT IN AN ORGANIZED HEALTH CARE EDUCATION/TRAINING PROGRAM
Payer: MEDICARE

## 2025-07-28 DIAGNOSIS — M12.812 ROTATOR CUFF ARTHROPATHY OF BOTH SHOULDERS: ICD-10-CM

## 2025-07-28 DIAGNOSIS — R29.898 WEAKNESS OF LEFT SHOULDER: Primary | ICD-10-CM

## 2025-07-28 DIAGNOSIS — M12.811 ROTATOR CUFF ARTHROPATHY OF BOTH SHOULDERS: ICD-10-CM

## 2025-07-28 PROCEDURE — 97110 THERAPEUTIC EXERCISES: CPT

## 2025-07-28 PROCEDURE — 97112 NEUROMUSCULAR REEDUCATION: CPT

## 2025-07-30 ENCOUNTER — OFFICE VISIT (OUTPATIENT)
Dept: PHYSICAL THERAPY | Facility: MEDICAL CENTER | Age: 75
End: 2025-07-30
Attending: STUDENT IN AN ORGANIZED HEALTH CARE EDUCATION/TRAINING PROGRAM
Payer: MEDICARE

## 2025-07-30 DIAGNOSIS — R29.898 WEAKNESS OF LEFT SHOULDER: Primary | ICD-10-CM

## 2025-07-30 DIAGNOSIS — M12.811 ROTATOR CUFF ARTHROPATHY OF BOTH SHOULDERS: ICD-10-CM

## 2025-07-30 DIAGNOSIS — M25.512 CHRONIC LEFT SHOULDER PAIN: ICD-10-CM

## 2025-07-30 DIAGNOSIS — M12.812 ROTATOR CUFF ARTHROPATHY OF BOTH SHOULDERS: ICD-10-CM

## 2025-07-30 DIAGNOSIS — G89.29 CHRONIC LEFT SHOULDER PAIN: ICD-10-CM

## 2025-07-30 PROCEDURE — 97110 THERAPEUTIC EXERCISES: CPT

## 2025-07-30 PROCEDURE — 97140 MANUAL THERAPY 1/> REGIONS: CPT

## 2025-08-04 ENCOUNTER — OFFICE VISIT (OUTPATIENT)
Dept: PHYSICAL THERAPY | Facility: MEDICAL CENTER | Age: 75
End: 2025-08-04
Attending: STUDENT IN AN ORGANIZED HEALTH CARE EDUCATION/TRAINING PROGRAM
Payer: MEDICARE

## 2025-08-04 DIAGNOSIS — R29.898 WEAKNESS OF LEFT SHOULDER: Primary | ICD-10-CM

## 2025-08-04 DIAGNOSIS — M12.811 ROTATOR CUFF ARTHROPATHY OF BOTH SHOULDERS: ICD-10-CM

## 2025-08-04 DIAGNOSIS — M25.512 CHRONIC LEFT SHOULDER PAIN: ICD-10-CM

## 2025-08-04 DIAGNOSIS — M12.812 ROTATOR CUFF ARTHROPATHY OF BOTH SHOULDERS: ICD-10-CM

## 2025-08-04 DIAGNOSIS — G89.29 CHRONIC LEFT SHOULDER PAIN: ICD-10-CM

## 2025-08-04 PROCEDURE — 97112 NEUROMUSCULAR REEDUCATION: CPT

## 2025-08-11 ENCOUNTER — OFFICE VISIT (OUTPATIENT)
Dept: PHYSICAL THERAPY | Facility: MEDICAL CENTER | Age: 75
End: 2025-08-11
Attending: STUDENT IN AN ORGANIZED HEALTH CARE EDUCATION/TRAINING PROGRAM
Payer: MEDICARE

## 2025-08-13 ENCOUNTER — OFFICE VISIT (OUTPATIENT)
Dept: PHYSICAL THERAPY | Facility: MEDICAL CENTER | Age: 75
End: 2025-08-13
Attending: STUDENT IN AN ORGANIZED HEALTH CARE EDUCATION/TRAINING PROGRAM
Payer: MEDICARE

## 2025-08-21 DIAGNOSIS — M12.811 ROTATOR CUFF ARTHROPATHY OF BOTH SHOULDERS: Primary | ICD-10-CM

## 2025-08-21 DIAGNOSIS — M12.812 ROTATOR CUFF ARTHROPATHY OF BOTH SHOULDERS: Primary | ICD-10-CM

## 2025-08-21 PROCEDURE — 20610 DRAIN/INJ JOINT/BURSA W/O US: CPT | Performed by: STUDENT IN AN ORGANIZED HEALTH CARE EDUCATION/TRAINING PROGRAM

## 2025-08-21 PROCEDURE — 99213 OFFICE O/P EST LOW 20 MIN: CPT | Performed by: STUDENT IN AN ORGANIZED HEALTH CARE EDUCATION/TRAINING PROGRAM

## 2025-08-21 RX ADMIN — LIDOCAINE HYDROCHLORIDE 2 ML: 10 INJECTION, SOLUTION INFILTRATION; PERINEURAL at 11:45

## 2025-08-21 RX ADMIN — BUPIVACAINE HYDROCHLORIDE 2 ML: 2.5 INJECTION, SOLUTION INFILTRATION; PERINEURAL at 11:45

## 2025-08-21 RX ADMIN — TRIAMCINOLONE ACETONIDE 40 MG: 40 INJECTION, SUSPENSION INTRA-ARTICULAR; INTRAMUSCULAR at 11:45

## 2025-08-22 RX ORDER — LIDOCAINE HYDROCHLORIDE 10 MG/ML
2 INJECTION, SOLUTION INFILTRATION; PERINEURAL
Status: COMPLETED | OUTPATIENT
Start: 2025-08-21 | End: 2025-08-21

## 2025-08-22 RX ORDER — TRIAMCINOLONE ACETONIDE 40 MG/ML
40 INJECTION, SUSPENSION INTRA-ARTICULAR; INTRAMUSCULAR
Status: COMPLETED | OUTPATIENT
Start: 2025-08-21 | End: 2025-08-21

## 2025-08-22 RX ORDER — BUPIVACAINE HYDROCHLORIDE 2.5 MG/ML
2 INJECTION, SOLUTION INFILTRATION; PERINEURAL
Status: COMPLETED | OUTPATIENT
Start: 2025-08-21 | End: 2025-08-21